# Patient Record
Sex: MALE | Race: WHITE | NOT HISPANIC OR LATINO | Employment: OTHER | URBAN - METROPOLITAN AREA
[De-identification: names, ages, dates, MRNs, and addresses within clinical notes are randomized per-mention and may not be internally consistent; named-entity substitution may affect disease eponyms.]

---

## 2017-01-29 ENCOUNTER — HOSPITAL ENCOUNTER (OUTPATIENT)
Dept: RADIOLOGY | Facility: MEDICAL CENTER | Age: 67
End: 2017-01-29
Attending: INTERNAL MEDICINE
Payer: MEDICARE

## 2017-01-29 DIAGNOSIS — C43.4 MALIGNANT MELANOMA OF SKIN OF SCALP AND NECK (HCC): ICD-10-CM

## 2017-01-29 PROCEDURE — 71260 CT THORAX DX C+: CPT

## 2017-01-29 PROCEDURE — 700117 HCHG RX CONTRAST REV CODE 255: Performed by: INTERNAL MEDICINE

## 2017-01-29 RX ADMIN — IOHEXOL 100 ML: 350 INJECTION, SOLUTION INTRAVENOUS at 15:03

## 2017-03-30 ENCOUNTER — HOSPITAL ENCOUNTER (OUTPATIENT)
Dept: RADIOLOGY | Facility: MEDICAL CENTER | Age: 67
End: 2017-03-30
Attending: INTERNAL MEDICINE
Payer: MEDICARE

## 2017-03-30 DIAGNOSIS — C43.4 MALIGNANT MELANOMA OF SKIN OF SCALP AND NECK (HCC): ICD-10-CM

## 2017-03-30 PROCEDURE — 71260 CT THORAX DX C+: CPT

## 2017-03-30 PROCEDURE — 700117 HCHG RX CONTRAST REV CODE 255: Performed by: INTERNAL MEDICINE

## 2017-03-30 RX ADMIN — IOHEXOL 100 ML: 350 INJECTION, SOLUTION INTRAVENOUS at 11:05

## 2017-08-09 ENCOUNTER — HOSPITAL ENCOUNTER (OUTPATIENT)
Dept: RADIOLOGY | Facility: MEDICAL CENTER | Age: 67
End: 2017-08-09
Attending: INTERNAL MEDICINE
Payer: MEDICARE

## 2017-08-09 DIAGNOSIS — C43.4 MALIGNANT MELANOMA OF SKIN OF SCALP AND NECK (HCC): ICD-10-CM

## 2017-08-09 PROCEDURE — 70553 MRI BRAIN STEM W/O & W/DYE: CPT

## 2017-08-09 PROCEDURE — A9579 GAD-BASE MR CONTRAST NOS,1ML: HCPCS | Performed by: INTERNAL MEDICINE

## 2017-08-09 PROCEDURE — 700117 HCHG RX CONTRAST REV CODE 255: Performed by: INTERNAL MEDICINE

## 2017-08-09 RX ADMIN — GADODIAMIDE 20 ML: 287 INJECTION INTRAVENOUS at 13:57

## 2017-08-10 ENCOUNTER — HOSPITAL ENCOUNTER (OUTPATIENT)
Dept: RADIOLOGY | Facility: MEDICAL CENTER | Age: 67
End: 2017-08-10
Attending: INTERNAL MEDICINE
Payer: MEDICARE

## 2017-08-10 DIAGNOSIS — C43.4 MALIGNANT MELANOMA OF SKIN OF SCALP AND NECK (HCC): ICD-10-CM

## 2017-08-10 PROCEDURE — 700117 HCHG RX CONTRAST REV CODE 255: Performed by: INTERNAL MEDICINE

## 2017-08-10 PROCEDURE — 71260 CT THORAX DX C+: CPT

## 2017-08-10 RX ADMIN — IOHEXOL 100 ML: 350 INJECTION, SOLUTION INTRAVENOUS at 13:44

## 2017-09-11 ENCOUNTER — HOSPITAL ENCOUNTER (EMERGENCY)
Facility: MEDICAL CENTER | Age: 67
End: 2017-09-11
Attending: EMERGENCY MEDICINE
Payer: MEDICARE

## 2017-09-11 ENCOUNTER — APPOINTMENT (OUTPATIENT)
Dept: RADIOLOGY | Facility: MEDICAL CENTER | Age: 67
End: 2017-09-11
Attending: EMERGENCY MEDICINE
Payer: MEDICARE

## 2017-09-11 VITALS
SYSTOLIC BLOOD PRESSURE: 115 MMHG | BODY MASS INDEX: 29.8 KG/M2 | HEART RATE: 95 BPM | OXYGEN SATURATION: 91 % | WEIGHT: 220.02 LBS | TEMPERATURE: 96.2 F | HEIGHT: 72 IN | DIASTOLIC BLOOD PRESSURE: 77 MMHG | RESPIRATION RATE: 16 BRPM

## 2017-09-11 DIAGNOSIS — R42 DIZZINESS: ICD-10-CM

## 2017-09-11 LAB
ALBUMIN SERPL BCP-MCNC: 3.9 G/DL (ref 3.2–4.9)
ALBUMIN/GLOB SERPL: 1.6 G/DL
ALP SERPL-CCNC: 109 U/L (ref 30–99)
ALT SERPL-CCNC: 19 U/L (ref 2–50)
ANION GAP SERPL CALC-SCNC: 7 MMOL/L (ref 0–11.9)
APPEARANCE UR: CLEAR
AST SERPL-CCNC: 26 U/L (ref 12–45)
BASOPHILS # BLD AUTO: 0.6 % (ref 0–1.8)
BASOPHILS # BLD: 0.05 K/UL (ref 0–0.12)
BILIRUB SERPL-MCNC: 0.6 MG/DL (ref 0.1–1.5)
BILIRUB UR QL STRIP.AUTO: NEGATIVE
BNP SERPL-MCNC: 44 PG/ML (ref 0–100)
BUN SERPL-MCNC: 10 MG/DL (ref 8–22)
CALCIUM SERPL-MCNC: 9.6 MG/DL (ref 8.5–10.5)
CHLORIDE SERPL-SCNC: 110 MMOL/L (ref 96–112)
CO2 SERPL-SCNC: 24 MMOL/L (ref 20–33)
COLOR UR: NORMAL
CREAT SERPL-MCNC: 0.85 MG/DL (ref 0.5–1.4)
CULTURE IF INDICATED INDCX: NO UA CULTURE
EKG IMPRESSION: NORMAL
EOSINOPHIL # BLD AUTO: 0.1 K/UL (ref 0–0.51)
EOSINOPHIL NFR BLD: 1.2 % (ref 0–6.9)
ERYTHROCYTE [DISTWIDTH] IN BLOOD BY AUTOMATED COUNT: 43.6 FL (ref 35.9–50)
GFR SERPL CREATININE-BSD FRML MDRD: >60 ML/MIN/1.73 M 2
GLOBULIN SER CALC-MCNC: 2.5 G/DL (ref 1.9–3.5)
GLUCOSE SERPL-MCNC: 106 MG/DL (ref 65–99)
GLUCOSE UR STRIP.AUTO-MCNC: NEGATIVE MG/DL
HCT VFR BLD AUTO: 48.3 % (ref 42–52)
HGB BLD-MCNC: 16.1 G/DL (ref 14–18)
IMM GRANULOCYTES # BLD AUTO: 0.04 K/UL (ref 0–0.11)
IMM GRANULOCYTES NFR BLD AUTO: 0.5 % (ref 0–0.9)
KETONES UR STRIP.AUTO-MCNC: NEGATIVE MG/DL
LEUKOCYTE ESTERASE UR QL STRIP.AUTO: NEGATIVE
LYMPHOCYTES # BLD AUTO: 2.36 K/UL (ref 1–4.8)
LYMPHOCYTES NFR BLD: 29.4 % (ref 22–41)
MCH RBC QN AUTO: 29.8 PG (ref 27–33)
MCHC RBC AUTO-ENTMCNC: 33.3 G/DL (ref 33.7–35.3)
MCV RBC AUTO: 89.4 FL (ref 81.4–97.8)
MICRO URNS: NORMAL
MONOCYTES # BLD AUTO: 0.8 K/UL (ref 0–0.85)
MONOCYTES NFR BLD AUTO: 10 % (ref 0–13.4)
NEUTROPHILS # BLD AUTO: 4.67 K/UL (ref 1.82–7.42)
NEUTROPHILS NFR BLD: 58.3 % (ref 44–72)
NITRITE UR QL STRIP.AUTO: NEGATIVE
NRBC # BLD AUTO: 0 K/UL
NRBC BLD AUTO-RTO: 0 /100 WBC
PH UR STRIP.AUTO: 5 [PH]
PLATELET # BLD AUTO: 116 K/UL (ref 164–446)
PMV BLD AUTO: 12.3 FL (ref 9–12.9)
POTASSIUM SERPL-SCNC: 3.7 MMOL/L (ref 3.6–5.5)
PROT SERPL-MCNC: 6.4 G/DL (ref 6–8.2)
PROT UR QL STRIP: NEGATIVE MG/DL
RBC # BLD AUTO: 5.4 M/UL (ref 4.7–6.1)
RBC UR QL AUTO: NEGATIVE
SODIUM SERPL-SCNC: 141 MMOL/L (ref 135–145)
SP GR UR STRIP.AUTO: 1.03
T4 FREE SERPL-MCNC: 0.83 NG/DL (ref 0.53–1.43)
TROPONIN I SERPL-MCNC: <0.01 NG/ML (ref 0–0.04)
TSH SERPL DL<=0.005 MIU/L-ACNC: 2.41 UIU/ML (ref 0.3–3.7)
UROBILINOGEN UR STRIP.AUTO-MCNC: 0.2 MG/DL
WBC # BLD AUTO: 8 K/UL (ref 4.8–10.8)

## 2017-09-11 PROCEDURE — 80053 COMPREHEN METABOLIC PANEL: CPT

## 2017-09-11 PROCEDURE — 99284 EMERGENCY DEPT VISIT MOD MDM: CPT

## 2017-09-11 PROCEDURE — 93005 ELECTROCARDIOGRAM TRACING: CPT | Performed by: EMERGENCY MEDICINE

## 2017-09-11 PROCEDURE — 84439 ASSAY OF FREE THYROXINE: CPT

## 2017-09-11 PROCEDURE — 83880 ASSAY OF NATRIURETIC PEPTIDE: CPT

## 2017-09-11 PROCEDURE — 71020 DX-CHEST-2 VIEWS: CPT

## 2017-09-11 PROCEDURE — 81003 URINALYSIS AUTO W/O SCOPE: CPT

## 2017-09-11 PROCEDURE — 36415 COLL VENOUS BLD VENIPUNCTURE: CPT

## 2017-09-11 PROCEDURE — 84443 ASSAY THYROID STIM HORMONE: CPT

## 2017-09-11 PROCEDURE — 84484 ASSAY OF TROPONIN QUANT: CPT

## 2017-09-11 PROCEDURE — 85025 COMPLETE CBC W/AUTO DIFF WBC: CPT

## 2017-09-11 ASSESSMENT — LIFESTYLE VARIABLES: DO YOU DRINK ALCOHOL: NO

## 2017-09-11 NOTE — ED PROVIDER NOTES
ED Provider Note    Scribed for Uvaldo Marino M.D. by Yon Julio. 9/11/2017, 4:14 PM.    Primary care provider: Yon Madison M.D.  Means of arrival: Walk In  History obtained from: Patient  History limited by: None    CHIEF COMPLAINT  Chief Complaint   Patient presents with   • Sent by MD Dr Agee    • Dizziness   • Leg Pain     bilateral, off an on   • Arm Pain     bilateral, off and on       Review of old chart shows no previous ED visits.  Recent CT scan a chest abdomen pelvis after diagnosis of malignant melanoma with no evidence of metastatic disease.  Findings as follows:     Impression            1.  There is been continued interval partial response to therapy with decrease in size of a prevascular lymph node with other mediastinal nodes stable.  2.  There has been interval decrease in size of the soft tissue nodule adjacent to the left quadratus lumborum muscle and inferior spleen with no change in the nodule adjacent to the superior spleen or inferior liver.  3.  There are no new areas of metastatic disease.   Reading Provider Reading Date   Monique Miranda M.D. Aug 10, 2017      Impression            1.  No evidence of intracranial metastatic disease.  2.  Mild diffuse cerebral and cerebellar substance loss.  3.  Mild microangiopathic ischemic change versus demyelination or gliosis.   Reading Provider Reading Date   Maxx Rao M.D. Aug 9, 2017        REVIEW OF SYSTEMS  ROS    PAST MEDICAL HISTORY   has a past medical history of Cancer (CMS-HCC); Heart burn; Hypertension; and Indigestion.    SURGICAL HISTORY   has a past surgical history that includes other; mass excision general (3/9/2012); recovery (7/26/2016); and colonoscopy - endo (10/9/2016).    SOCIAL HISTORY  Social History   Substance Use Topics   • Smoking status: Current Every Day Smoker     Packs/day: 1.00     Years: 40.00     Types: Cigarettes   • Smokeless tobacco: Never Used   • Alcohol use Yes      Comment: very little       History   Drug Use No       FAMILY HISTORY  History reviewed. No pertinent family history.    CURRENT MEDICATIONS  No current facility-administered medications on file prior to encounter.      Current Outpatient Prescriptions on File Prior to Encounter   Medication Sig Dispense Refill   • felodipine (PLENDIL) 5 MG TABLET SR 24 HR Take 5 mg by mouth every day.     • diltiazem (CARDIZEM) 30 MG Tab Take 1 Tab by mouth as needed. 30 Tab 3   • aspirin EC 81 MG EC tablet Take 1 Tab by mouth every day. 100 Tab 3   • loperamide (IMODIUM) 2 MG Cap Take 1 Cap by mouth 4 times a day as needed for Diarrhea. 30 Cap 3   • simvastatin (ZOCOR) 20 MG TABS Take 20 mg by mouth every morning.     • omeprazole (PRILOSEC) 20 MG CPDR Take 20 mg by mouth every day.     • valsartan-hydrochlorothiazide (DIOVAN HCT) 320-12.5 MG per tablet Take 1 Tab by mouth every day.     • Multiple Vitamin (MULTIVITAMIN PO) Take  by mouth every day.     • hydrocodone-acetaminophen (NORCO) 5-325 MG TABS per tablet Take 1-2 Tabs by mouth every four hours as needed.         ALLERGIES  Allergies   Allergen Reactions   • Sulfa Drugs      Severe muscle pains       PHYSICAL EXAM  VITAL SIGNS: /73   Pulse 89   Temp 36.6 °C (97.9 °F) (Temporal)   Resp 16   Ht 1.829 m (6')   Wt 99.8 kg (220 lb 0.3 oz)   SpO2 96%   BMI 29.84 kg/m²     Constitutional: Well developed, Well nourished, No acute distress, Non-toxic appearance.   HENT: Normocephalic, Atraumatic, Bilateral external ears normal, Oropharynx moist, no evidence of dehydration, No oral exudates, Nose normal.   Eyes: PERRLA, EOMI, Conjunctiva normal, No discharge.   Neck: Normal range of motion, No tenderness, Supple, No stridor. No masses. No evidence of meningitis or meningismus.   Lymphatic: No lymphadenopathy noted.   Cardiovascular: Normal heart rate, Normal rhythm, No murmurs, No rubs, No gallops.   Thorax & Lungs: Normal breath sounds, No respiratory distress, No wheezing or rhonchi, No chest  tenderness.   Abdomen: Bowel sounds normal, Soft, No tenderness, No masses, No pulsatile masses. No guarding or rebound. No evidence of peritoneal findings.  Skin: Warm, Dry, No erythema, No rash. No exanthem.   Back: No tenderness  Extremities:  No edema, No tenderness, No cyanosis, No clubbing.   Musculoskeletal: Good range of motion in all major joints. No major deformities noted.   Neurologic: Alert & oriented x 3, Normal motor function, No focal deficits noted. And a stroke scale of 0  Psychiatric: Affect normal, mood normal.                                                              DIAGNOSTIC STUDIES / PROCEDURES    LABS  Results for orders placed or performed during the hospital encounter of 09/11/17   CBC WITH DIFFERENTIAL   Result Value Ref Range    WBC 8.0 4.8 - 10.8 K/uL    RBC 5.40 4.70 - 6.10 M/uL    Hemoglobin 16.1 14.0 - 18.0 g/dL    Hematocrit 48.3 42.0 - 52.0 %    MCV 89.4 81.4 - 97.8 fL    MCH 29.8 27.0 - 33.0 pg    MCHC 33.3 (L) 33.7 - 35.3 g/dL    RDW 43.6 35.9 - 50.0 fL    Platelet Count 116 (L) 164 - 446 K/uL    MPV 12.3 9.0 - 12.9 fL    Neutrophils-Polys 58.30 44.00 - 72.00 %    Lymphocytes 29.40 22.00 - 41.00 %    Monocytes 10.00 0.00 - 13.40 %    Eosinophils 1.20 0.00 - 6.90 %    Basophils 0.60 0.00 - 1.80 %    Immature Granulocytes 0.50 0.00 - 0.90 %    Nucleated RBC 0.00 /100 WBC    Neutrophils (Absolute) 4.67 1.82 - 7.42 K/uL    Lymphs (Absolute) 2.36 1.00 - 4.80 K/uL    Monos (Absolute) 0.80 0.00 - 0.85 K/uL    Eos (Absolute) 0.10 0.00 - 0.51 K/uL    Baso (Absolute) 0.05 0.00 - 0.12 K/uL    Immature Granulocytes (abs) 0.04 0.00 - 0.11 K/uL    NRBC (Absolute) 0.00 K/uL   COMP METABOLIC PANEL   Result Value Ref Range    Sodium 141 135 - 145 mmol/L    Potassium 3.7 3.6 - 5.5 mmol/L    Chloride 110 96 - 112 mmol/L    Co2 24 20 - 33 mmol/L    Anion Gap 7.0 0.0 - 11.9    Glucose 106 (H) 65 - 99 mg/dL    Bun 10 8 - 22 mg/dL    Creatinine 0.85 0.50 - 1.40 mg/dL    Calcium 9.6 8.5 - 10.5 mg/dL     AST(SGOT) 26 12 - 45 U/L    ALT(SGPT) 19 2 - 50 U/L    Alkaline Phosphatase 109 (H) 30 - 99 U/L    Total Bilirubin 0.6 0.1 - 1.5 mg/dL    Albumin 3.9 3.2 - 4.9 g/dL    Total Protein 6.4 6.0 - 8.2 g/dL    Globulin 2.5 1.9 - 3.5 g/dL    A-G Ratio 1.6 g/dL   ESTIMATED GFR   Result Value Ref Range    GFR If African American >60 >60 mL/min/1.73 m 2    GFR If Non African American >60 >60 mL/min/1.73 m 2       All labs reviewed by me.    RADIOLOGY  DX-CHEST-2 VIEWS   Final Result      No acute cardiopulmonary disease.        The radiologist's interpretation of all radiological studies have been reviewed by me.    COURSE & MEDICAL DECISION MAKING  Nursing notes, VS, PMSFHx reviewed in chart.    Review of old chart shows no previous ED visits.  Recent CT scan a chest abdomen pelvis after diagnosis of malignant melanoma with no evidence of metastatic disease.  Findings as follows:     Impression            1.  There is been continued interval partial response to therapy with decrease in size of a prevascular lymph node with other mediastinal nodes stable.  2.  There has been interval decrease in size of the soft tissue nodule adjacent to the left quadratus lumborum muscle and inferior spleen with no change in the nodule adjacent to the superior spleen or inferior liver.  3.  There are no new areas of metastatic disease.   Reading Provider Reading Date   Monique Miranda M.D. Aug 10, 2017      Impression            1.  No evidence of intracranial metastatic disease.  2.  Mild diffuse cerebral and cerebellar substance loss.  3.  Mild microangiopathic ischemic change versus demyelination or gliosis.   Reading Provider Reading Date   Maxx Rao M.D. Aug 9, 2017          4:48 PM - Patient seen and examined at bedside. Ordered chest x-ray, TSH, CBC with differential, CMP, Troponin, BNP, UA to evaluate his symptoms. The differential diagnoses include but are not limited to:Neurologic versus cardiac events    3:56 PM -  Consulted with Dr. Agee, Oncology, regarding patient's case.     6:55 PM - Reviewed diagnostic study results     7:00 PM - Paged Dr. Agee, Oncology    7:05 PM - I discussed patient's case and findings noted above with Dr. Agee, Oncology.     7:24 PM - Recheck patient. I discussed plan of care with patient regarding admission to hospital for further observation. Patient declines admission and states he will follow up with PCP.         The patient will return for new or worsening symptoms and is stable at the time of discharge.    The patient is referred to a primary physician for blood pressure management, diabetic screening, and for all other preventative health concerns.    Discussed disposition with Dr. Agee. He will follow-up with the patient in the office.    DISPOSITION:  Patient will be discharged home in stable condition.    FOLLOW UP:  No follow-up provider specified.    OUTPATIENT MEDICATIONS:  New Prescriptions    No medications on file          FINAL IMPRESSION  1. Dizziness           Yon DICKINSON (Jimenaibe), am scribing for, and in the presence of, Uvaldo Marino M.D..    Electronically signed by: Yon Julio (Brian), 9/11/2017    Uvaldo DICKINSON M.D. personally performed the services described in this documentation, as scribed by Yon Julio in my presence, and it is both accurate and complete.    The note accurately reflects work and decisions made by me.  Uvaldo Marino  9/11/2017  8:00 PM

## 2017-09-11 NOTE — ED NOTES
Chief Complaint   Patient presents with   • Sent by MD Dr Agee    • Dizziness   • Leg Pain     bilateral, off an on   • Arm Pain     bilateral, off and on     Dr Agee suspects dehydration, told pt to come to ER. Pt speaking in full sentences, in NAD.       /73   Pulse 89   Temp 36.6 °C (97.9 °F) (Temporal)   Resp 16   Ht 1.829 m (6')   Wt 99.8 kg (220 lb 0.3 oz)   SpO2 96%   BMI 29.84 kg/m²       Pt Informed regarding triage process and verbalized understanding to inform triage tech or RN for any changes in condition.  Placed in lobby.

## 2017-09-11 NOTE — ED PROVIDER NOTES
ED Provider Note          CHIEF COMPLAINT  Chief Complaint   Patient presents with   • Sent by MD Dr Agee    • Dizziness   • Leg Pain     bilateral, off an on   • Arm Pain     bilateral, off and on       HPI  Artem Joseph is a 67 y.o. male who presents to the emergency department come in by wife for evaluation of episodes of near-syncope. Patient states that if he does not exert effort  these symptoms do not occur. Patient has recent event while doing minimal guarding. He is not completely passed out. Patient notes that since chemotherapy for his malignant melanoma he's had intermittent difficulties. Initially he thought it was secondary to the chemotherapy. These episodes and associated with headache chest pain or shortness of breath. Patient notes that he takes his pulse ox blood pressure and pulse during these episodes and has not found any abnormality.    Patient contacted his oncologist Dr. Agee and was advised to report to the ED and contact Dr. Agee upon arrival.    He notes no aggravating or relieving maneuvers. After speaking with Dr. Agee had cough occasions of immunotherapy including hepatitis colitis and thyroid disease. Patient's recently been tapered down on his prednisone to 2.5 mg every day. He described no history of heart disease lung disease or previous neurologic disease. No stroke. No focal weakness. Patient does describe some vertigo-type symptoms. No difficulty with speech. No aggravating or relieving maneuvers.    Review of old chart shows no previous ED visits.  Recent CT scan a chest abdomen pelvis after diagnosis of malignant melanoma with no evidence of metastatic disease.  Findings as follows:    Impression       1.  There is been continued interval partial response to therapy with decrease in size of a prevascular lymph node with other mediastinal nodes stable.  2.  There has been interval decrease in size of the soft tissue nodule adjacent to the left quadratus lumborum  muscle and inferior spleen with no change in the nodule adjacent to the superior spleen or inferior liver.  3.  There are no new areas of metastatic disease.   Reading Provider Reading Date   Monique Miranda M.D. Aug 10, 2017     Impression       1.  No evidence of intracranial metastatic disease.  2.  Mild diffuse cerebral and cerebellar substance loss.  3.  Mild microangiopathic ischemic change versus demyelination or gliosis.   Reading Provider Reading Date   Maxx Rao M.D. Aug 9, 2017       REVIEW OF SYSTEMS  See HPI for further details. All other systems are negative.     PAST MEDICAL HISTORY  Past Medical History:   Diagnosis Date   • Cancer (CMS-HCC)     melanoma in 2007, original 1989 from back of neck   • Heart burn    • Hypertension    • Indigestion        FAMILY HISTORY  No significant family history    SOCIAL HISTORY  Social History     Social History   • Marital status:      Spouse name: N/A   • Number of children: N/A   • Years of education: N/A     Social History Main Topics   • Smoking status: Current Every Day Smoker     Packs/day: 1.00     Years: 40.00     Types: Cigarettes   • Smokeless tobacco: Never Used   • Alcohol use Yes      Comment: very little   • Drug use: No   • Sexual activity: Not on file     Other Topics Concern   • Not on file     Social History Narrative   • No narrative on file       SURGICAL HISTORY  Past Surgical History:   Procedure Laterality Date   • COLONOSCOPY - ENDO  10/9/2016    Procedure: COLONOSCOPY - ENDO;  Surgeon: William Mitchell M.D.;  Location: SURGERY [facility];  Service:    • RECOVERY  7/26/2016    Procedure: CT-CT Guided abdominal mass-;  Surgeon: [facility];  Location: SURGERY PRE-POST [facility] UNIT [facility] [place];  Service:    • MASS EXCISION GENERAL  3/9/2012    Performed by MARLEN KEEN at SURGERY SAME DAY [facility]   • OTHER      lymph nodes back of neck       CURRENT MEDICATIONS  Home Medications    **Home medications have not yet  been reviewed for this encounter**          ALLERGIES  Allergies   Allergen Reactions   • Sulfa Drugs      Severe muscle pains       PHYSICAL EXAM  VITAL SIGNS: /73   Pulse 89   Temp 36.6 °C (97.9 °F) (Temporal)   Resp 16   Ht 1.829 m (6')   Wt 99.8 kg (220 lb 0.3 oz)   SpO2 96%   BMI 29.84 kg/m²     Constitutional: Well developed, Well nourished, No acute distress, Non-toxic appearance.   HENT: Normocephalic, Atraumatic, Bilateral external ears normal, Oropharynx moist, no evidence of dehydration, No oral exudates, Nose normal.   Eyes: PERRLA, EOMI, Conjunctiva normal, No discharge.   Neck: Normal range of motion, No tenderness, Supple, No stridor. No masses. No evidence of meningitis or meningismus.   Lymphatic: No lymphadenopathy noted.   Cardiovascular: Normal heart rate, Normal rhythm, No murmurs, No rubs, No gallops.   Thorax & Lungs: Normal breath sounds, No respiratory distress, No wheezing or rhonchi, No chest tenderness.   Abdomen: Bowel sounds normal, Soft, No tenderness, No masses, No pulsatile masses. No guarding or rebound. No evidence of peritoneal findings.  Skin: Warm, Dry, No erythema, No rash. No exanthem.   Back: No tenderness.   Extremities: Intact distal pulses, No edema, No tenderness, No cyanosis, No clubbing. No clinical evidence of DVT  Musculoskeletal: Good range of motion in all major joints. No major deformities noted.   Neurologic: Alert & oriented x 3, Normal motor function, No focal deficits noted. NIHstroke scale of 0  Psychiatric: Affect normal, mood normal.                                                     Urologic: No CVA tenderness no evidence of pyelonephritis.                         EKG  EKG Interpretation    Interpreted by me    Rhythm: normal sinus   Rate: normal 64  Axis: normal  Ectopy: none  Conduction: right bundle branch block (complete)  ST Segments: depression in  v1 and v2  T Waves: inversion in  v1 and v2  Q Waves: Inferior Q's in 3 and  aVF    Clinical Impression: no acute changes and right bundle branch block    Does have an change from previous EKG.    RADIOLOGY/PROCEDURES  DX-CHEST-2 VIEWS   Final Result      No acute cardiopulmonary disease.            COURSE & MEDICAL DECISION MAKING  Pertinent Labs & Imaging studies reviewed. (See chart for details)      7:05 PM once again spoke with Dr. Agee. He requested to be called after the laboratory evaluation. Notes possible symptoms secondary to coming off of high dose of steroids.. He suggests orthostatic vital signs. He will follow up in clinic.    Discussed At length with patient and his wife admission for observation. Patient will not be admitted. Advised Holter monitoring to be placed tomorrow. Patient is not believed secondary to his heart as he is monitoring his pulse and blood pressure and pulse ox during these events.    FINAL IMPRESSION  1. Dizziness    2. Without evidence of acute neurosurgical abnormality  3. Without evidence of acute cardiac event       Electronically signed by: Uvaldo Marino, 9/11/2017 7:09 PM

## 2017-09-11 NOTE — ED NOTES
".  Chief Complaint   Patient presents with   • Sent by MD Dr Agee    • Dizziness   • Leg Pain     bilateral, off an on   • Arm Pain     bilateral, off and on   Agree with triage assessment. Onset approximately one month ago, \" getting progressively worse.\"  Pt states \" the warmer it is the faster the symptoms come on - nausea and fatigue.\"  No chest pain.  No headache, \" just general weakness all the time.\"      "

## 2017-09-12 ENCOUNTER — PATIENT OUTREACH (OUTPATIENT)
Dept: HEALTH INFORMATION MANAGEMENT | Facility: OTHER | Age: 67
End: 2017-09-12

## 2017-09-12 NOTE — DISCHARGE INSTRUCTIONS
Dizziness  Dizziness is a common problem. It is a feeling of unsteadiness or light-headedness. You may feel like you are about to faint. Dizziness can lead to injury if you stumble or fall. Anyone can become dizzy, but dizziness is more common in older adults. This condition can be caused by a number of things, including medicines, dehydration, or illness.  HOME CARE INSTRUCTIONS  Taking these steps may help with your condition:  Eating and Drinking  · Drink enough fluid to keep your urine clear or pale yellow. This helps to keep you from becoming dehydrated. Try to drink more clear fluids, such as water.  · Do not drink alcohol.  · Limit your caffeine intake if directed by your health care provider.  · Limit your salt intake if directed by your health care provider.  Activity  · Avoid making quick movements.  ¨ Rise slowly from chairs and steady yourself until you feel okay.  ¨ In the morning, first sit up on the side of the bed. When you feel okay, stand slowly while you hold onto something until you know that your balance is fine.  · Move your legs often if you need to  one place for a long time. Tighten and relax your muscles in your legs while you are standing.  · Do not drive or operate heavy machinery if you feel dizzy.  · Avoid bending down if you feel dizzy. Place items in your home so that they are easy for you to reach without leaning over.  Lifestyle  · Do not use any tobacco products, including cigarettes, chewing tobacco, or electronic cigarettes. If you need help quitting, ask your health care provider.  · Try to reduce your stress level, such as with yoga or meditation. Talk with your health care provider if you need help.  General Instructions  · Watch your dizziness for any changes.  · Take medicines only as directed by your health care provider. Talk with your health care provider if you think that your dizziness is caused by a medicine that you are taking.  · Tell a friend or a family  member that you are feeling dizzy. If he or she notices any changes in your behavior, have this person call your health care provider.  · Keep all follow-up visits as directed by your health care provider. This is important.  SEEK MEDICAL CARE IF:  · Your dizziness does not go away.  · Your dizziness or light-headedness gets worse.  · You feel nauseous.  · You have reduced hearing.  · You have new symptoms.  · You are unsteady on your feet or you feel like the room is spinning.  SEEK IMMEDIATE MEDICAL CARE IF:  · You vomit or have diarrhea and are unable to eat or drink anything.  · You have problems talking, walking, swallowing, or using your arms, hands, or legs.  · You feel generally weak.  · You are not thinking clearly or you have trouble forming sentences. It may take a friend or family member to notice this.  · You have chest pain, abdominal pain, shortness of breath, or sweating.  · Your vision changes.  · You notice any bleeding.  · You have a headache.  · You have neck pain or a stiff neck.  · You have a fever.     This information is not intended to replace advice given to you by your health care provider. Make sure you discuss any questions you have with your health care provider.     Document Released: 06/13/2002 Document Revised: 05/03/2016 Document Reviewed: 12/14/2015  [employer] Interactive Patient Education ©2016 [employer]      Cardiac Event Monitoring  A cardiac event monitor is a small recording device used to help detect abnormal heart rhythms (arrhythmias). The monitor is used to record heart rhythm when noticeable symptoms such as the following occur:  · Fast heartbeats (palpitations), such as heart racing or fluttering.  · Dizziness.  · Fainting or light-headedness.  · Unexplained weakness.  The monitor is wired to two electrodes placed on your chest. Electrodes are flat, sticky disks that attach to your skin. The monitor can be worn for up to 30 days. You will wear the monitor at all  times, except when bathing.   HOW TO USE YOUR CARDIAC EVENT MONITOR  A technician will prepare your chest for the electrode placement. The technician will show you how to place the electrodes, how to work the monitor, and how to replace the batteries. Take time to practice using the monitor before you leave the office. Make sure you understand how to send the information from the monitor to your health care provider. This requires a telephone with a landline, not a cell phone. You need to:  · Wear your monitor at all times, except when you are in water:  ¨ Do not get the monitor wet.  ¨ Take the monitor off when bathing. Do not swim or use a hot tub with it on.  · Keep your skin clean. Do not put body lotion or moisturizer on your chest.  · Change the electrodes daily or any time they stop sticking to your skin. You might need to use tape to keep them on.  · It is possible that your skin under the electrodes could become irritated. To keep this from happening, try to put the electrodes in slightly different places on your chest. However, they must remain in the area under your left breast and in the upper right section of your chest.  · Make sure the monitor is safely clipped to your clothing or in a location close to your body that your health care provider recommends.  · Press the button to record when you feel symptoms of heart trouble, such as dizziness, weakness, light-headedness, palpitations, thumping, shortness of breath, unexplained weakness, or a fluttering or racing heart. The monitor is always on and records what happened slightly before you pressed the button, so do not worry about being too late to get good information.  · Keep a diary of your activities, such as walking, doing chores, and taking medicine. It is especially important to note what you were doing when you pushed the button to record your symptoms. This will help your health care provider determine what might be contributing to your  symptoms. The information stored in your monitor will be reviewed by your health care provider alongside your diary entries.  · Send the recorded information as recommended by your health care provider. It is important to understand that it will take some time for your health care provider to process the results.  · Change the batteries as recommended by your health care provider.  SEEK IMMEDIATE MEDICAL CARE IF:   · You have chest pain.  · You have extreme difficulty breathing or shortness of breath.  · You develop a very fast heartbeat that persists.  · You develop dizziness that does not go away.  · You faint or constantly feel you are about to faint.     This information is not intended to replace advice given to you by your health care provider. Make sure you discuss any questions you have with your health care provider.    Return if you change your decision about admission. Contact your physician tomorrow to arrange for Holter monitoring.     Document Released: 09/26/2009 Document Revised: 01/08/2016 Document Reviewed: 06/16/2014  [employer]vier Interactive Patient Education ©2016 [employer]

## 2017-09-12 NOTE — PROGRESS NOTES
Placed discharge outreach phone call to patient s/p ER discharge 9/11/17.  Left voicemail providing my contact information and instructions to call with any questions or concerns.

## 2017-09-12 NOTE — ED NOTES
Report rec'd from SCOTT Reece.    ED tech completed orthostatics.    New lab orders added to blood in lab..

## 2017-11-06 ENCOUNTER — OFFICE VISIT (OUTPATIENT)
Dept: CARDIOLOGY | Facility: MEDICAL CENTER | Age: 67
End: 2017-11-06
Payer: MEDICARE

## 2017-11-06 VITALS
OXYGEN SATURATION: 92 % | HEIGHT: 72 IN | WEIGHT: 220 LBS | DIASTOLIC BLOOD PRESSURE: 82 MMHG | HEART RATE: 84 BPM | BODY MASS INDEX: 29.8 KG/M2 | SYSTOLIC BLOOD PRESSURE: 110 MMHG

## 2017-11-06 DIAGNOSIS — E87.6 HYPOKALEMIA: ICD-10-CM

## 2017-11-06 DIAGNOSIS — C43.9 METASTATIC MELANOMA (HCC): ICD-10-CM

## 2017-11-06 DIAGNOSIS — I10 ESSENTIAL HYPERTENSION: ICD-10-CM

## 2017-11-06 DIAGNOSIS — I48.91 ATRIAL FIBRILLATION WITH RAPID VENTRICULAR RESPONSE (HCC): ICD-10-CM

## 2017-11-06 DIAGNOSIS — I20.0 UNSTABLE ANGINA PECTORIS (HCC): ICD-10-CM

## 2017-11-06 PROBLEM — I25.9 CHEST PAIN DUE TO MYOCARDIAL ISCHEMIA: Status: ACTIVE | Noted: 2017-11-06

## 2017-11-06 PROCEDURE — 99215 OFFICE O/P EST HI 40 MIN: CPT | Performed by: INTERNAL MEDICINE

## 2017-11-06 RX ORDER — FLECAINIDE ACETATE 50 MG/1
50 TABLET ORAL 2 TIMES DAILY
Qty: 60 TAB | Refills: 11 | Status: SHIPPED | OUTPATIENT
Start: 2017-11-06 | End: 2017-12-15 | Stop reason: SDUPTHER

## 2017-11-06 RX ORDER — DILTIAZEM HYDROCHLORIDE 120 MG/1
120 CAPSULE, COATED, EXTENDED RELEASE ORAL DAILY
Qty: 30 CAP | Refills: 11 | Status: SHIPPED | OUTPATIENT
Start: 2017-11-06 | End: 2018-10-21 | Stop reason: SDUPTHER

## 2017-11-06 RX ORDER — TAMSULOSIN HYDROCHLORIDE 0.4 MG/1
0.4 CAPSULE ORAL
COMMUNITY
End: 2022-05-02

## 2017-11-06 RX ORDER — PREDNISONE 10 MG/1
5 TABLET ORAL DAILY
COMMUNITY
End: 2018-10-15

## 2017-11-06 RX ORDER — LEVOTHYROXINE SODIUM 137 UG/1
137 TABLET ORAL
COMMUNITY
End: 2018-04-09

## 2017-11-06 ASSESSMENT — ENCOUNTER SYMPTOMS
HEMOPTYSIS: 0
BRUISES/BLEEDS EASILY: 0
WHEEZING: 0
CLAUDICATION: 0
SPUTUM PRODUCTION: 0
STRIDOR: 0
EYES NEGATIVE: 1
SHORTNESS OF BREATH: 0
RESPIRATORY NEGATIVE: 1
CHILLS: 0
MUSCULOSKELETAL NEGATIVE: 1
DIZZINESS: 0
SORE THROAT: 0
WEAKNESS: 1
ORTHOPNEA: 0
PALPITATIONS: 0
FEVER: 0
COUGH: 0
GASTROINTESTINAL NEGATIVE: 1
PND: 0
LOSS OF CONSCIOUSNESS: 0
CARDIOVASCULAR NEGATIVE: 1

## 2017-11-06 NOTE — LETTER
[facility]   [address] [place] [address]  [place] [address]  Phone: [phone]  Fax: [phone]              Artem Joseph  1950    Encounter Date: 11/6/2017    Boogie Ramirez M.D.          PROGRESS NOTE:  Subjective:   Artem Joseph is a 67 y.o. male who presents today as a follow-up for his paroxysmal atrial fibrillation. Since he was last seen he continues have paroxysms of what sounds like atrial fibrillation. During these times he was also having problems with some chest pain. Of note he has some dense calcifications seen in his LAD. He also has stage IV melanoma that is currently being treated with chemotherapy.  He is currently not taking any medications for rhythm control or rate control.  He's not had a  Event recorder or Holter monitor.    Past Medical History:   Diagnosis Date   • Cancer (CMS-HCC)     melanoma in 2007, original 1989 from back of neck   • Heart burn    • Hypertension    • Indigestion      Past Surgical History:   Procedure Laterality Date   • COLONOSCOPY - ENDO  10/9/2016    Procedure: COLONOSCOPY - ENDO;  Surgeon: William Mitchell M.D.;  Location: SURGERY [facility];  Service:    • RECOVERY  7/26/2016    Procedure: CT-CT Guided abdominal mass-;  Surgeon: [facility];  Location: SURGERY PRE-POST PROC UNIT [facility] [place];  Service:    • MASS EXCISION GENERAL  3/9/2012    Performed by MARLEN KEEN at SURGERY SAME DAY [facility]   • OTHER      lymph nodes back of neck     History reviewed. No pertinent family history.  History   Smoking Status   • Current Every Day Smoker   • Packs/day: 0.50   • Years: 40.00   • Types: Cigarettes   Smokeless Tobacco   • Never Used     Allergies   Allergen Reactions   • Sulfa Drugs      Severe muscle pains     Outpatient Encounter Prescriptions as of 11/6/2017   Medication Sig Dispense Refill   • levothyroxine (SYNTHROID) 137 MCG Tab Take 137 mcg by mouth Every morning on an empty  stomach.     • predniSONE (DELTASONE) 10 MG Tab Take 10 mg by mouth every day.     • tamsulosin (FLOMAX) 0.4 MG capsule Take 0.4 mg by mouth ONE-HALF HOUR AFTER BREAKFAST.     • diltiazem CD (CARDIZEM CD) 120 MG CAPSULE SR 24 HR Take 1 Cap by mouth every day. 30 Cap 11   • flecainide (TAMBOCOR) 50 MG tablet Take 1 Tab by mouth 2 times a day. 60 Tab 11   • felodipine (PLENDIL) 5 MG TABLET SR 24 HR Take 2.5 mg by mouth every day.     • diltiazem (CARDIZEM) 30 MG Tab Take 1 Tab by mouth as needed. 30 Tab 3   • loperamide (IMODIUM) 2 MG Cap Take 1 Cap by mouth 4 times a day as needed for Diarrhea. 30 Cap 3   • simvastatin (ZOCOR) 20 MG TABS Take 20 mg by mouth every morning.     • omeprazole (PRILOSEC) 20 MG CPDR Take 20 mg by mouth every day.     • Multiple Vitamin (MULTIVITAMIN PO) Take  by mouth every day.     • hydrocodone-acetaminophen (NORCO) 5-325 MG TABS per tablet Take 1-2 Tabs by mouth every four hours as needed.     • aspirin EC 81 MG EC tablet Take 1 Tab by mouth every day. (Patient not taking: Reported on 11/6/2017) 100 Tab 3   • valsartan-hydrochlorothiazide (DIOVAN HCT) 320-12.5 MG per tablet Take 1 Tab by mouth every day.       No facility-administered encounter medications on file as of 11/6/2017.      Review of Systems   Constitutional: Positive for malaise/fatigue. Negative for chills and fever.   HENT: Negative.  Negative for sore throat.    Eyes: Negative.    Respiratory: Negative.  Negative for cough, hemoptysis, sputum production, shortness of breath, wheezing and stridor.    Cardiovascular: Negative.  Negative for chest pain, palpitations, orthopnea, claudication, leg swelling and PND.   Gastrointestinal: Negative.    Genitourinary: Negative.    Musculoskeletal: Negative.    Skin: Negative.    Neurological: Positive for weakness. Negative for dizziness and loss of consciousness.   Endo/Heme/Allergies: Negative.  Does not bruise/bleed easily.   All other systems reviewed and are negative.        Objective:   /82   Pulse 84   Ht 1.829 m (6')   Wt 99.8 kg (220 lb)   SpO2 92%   BMI 29.84 kg/m²      Physical Exam   Constitutional: He is oriented to person, place, and time. He appears well-developed and well-nourished. No distress.   HENT:   Head: Normocephalic.   Mouth/Throat: Oropharynx is clear and moist.   Eyes: EOM are normal. Pupils are equal, round, and reactive to light. Right eye exhibits no discharge. Left eye exhibits no discharge. No scleral icterus.   Neck: Normal range of motion. Neck supple. No JVD present. No tracheal deviation present.   Cardiovascular: Normal rate, regular rhythm, S1 normal, S2 normal, normal heart sounds, intact distal pulses and normal pulses.  Exam reveals no gallop, no S3, no S4 and no friction rub.    No murmur heard.   No systolic murmur is present    No diastolic murmur is present   Pulses:       Carotid pulses are 2+ on the right side, and 2+ on the left side.       Radial pulses are 2+ on the right side, and 2+ on the left side.        Dorsalis pedis pulses are 2+ on the right side, and 2+ on the left side.        Posterior tibial pulses are 2+ on the right side, and 2+ on the left side.   Pulmonary/Chest: Effort normal and breath sounds normal. No respiratory distress. He has no wheezes. He has no rales.   Abdominal: Soft. Bowel sounds are normal. He exhibits no distension and no mass. There is no tenderness. There is no rebound and no guarding.   Musculoskeletal: He exhibits no edema.   Neurological: He is alert and oriented to person, place, and time. No cranial nerve deficit.   Skin: Skin is warm and dry. He is not diaphoretic. No pallor.   Psychiatric: He has a normal mood and affect. His behavior is normal. Judgment and thought content normal.   Nursing note and vitals reviewed.      Assessment:     1. Atrial fibrillation with rapid ventricular response (CMS-HCC)  diltiazem CD (CARDIZEM CD) 120 MG CAPSULE SR 24 HR    HOLTER MONITOR / EVENT RECORDER     flecainide (TAMBOCOR) 50 MG tablet   2. Essential hypertension  diltiazem CD (CARDIZEM CD) 120 MG CAPSULE SR 24 HR    HOLTER MONITOR / EVENT RECORDER   3. Metastatic melanoma (CMS-HCC)     4. Hypokalemia     5. Unstable angina pectoris (CMS-HCC)  flecainide (TAMBOCOR) 50 MG tablet       Medical Decision Making:  Today's Assessment / Status / Plan:     67-year-old male with stage IV metastatic melanoma being treated with chemotherapy as well as paroxysmal atrial fibrillation with calcium seen on a CT scan. His chest pain is likely happen in the setting of A. Fib with rapid ventricular response. I would like to start him on flecainide 50 twice a day as well as diltiazem. We will also get a an eventr recorder and see how often he is having any runs of atrial fibrillation.    1. A-fib    - start flecainide 50 BID    - start dilt 120 CD    - event recorder    2. 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 Stage IV melanoma    - defer    Thank for you allowing me to take part in your patient's care, please call should you have any questions or would like to discuss this patient.      Yon Madison M.D.  [address] [place] [address]  VIA Facsimile: [phone]

## 2017-11-07 NOTE — PROGRESS NOTES
Subjective:   Artem Joseph is a 67 y.o. male who presents today as a follow-up for his paroxysmal atrial fibrillation. Since he was last seen he continues have paroxysms of what sounds like atrial fibrillation. During these times he was also having problems with some chest pain. Of note he has some dense calcifications seen in his LAD. He also has stage IV melanoma that is currently being treated with chemotherapy.  He is currently not taking any medications for rhythm control or rate control.  He's not had a  Event recorder or Holter monitor.    Past Medical History:   Diagnosis Date   • Cancer (CMS-HCC)     melanoma in 2007, original 1989 from back of neck   • Heart burn    • Hypertension    • Indigestion      Past Surgical History:   Procedure Laterality Date   • COLONOSCOPY - ENDO  10/9/2016    Procedure: COLONOSCOPY - ENDO;  Surgeon: William Mitchell M.D.;  Location: SURGERY [facility];  Service:    • RECOVERY  7/26/2016    Procedure: CT-CT Guided abdominal mass-;  Surgeon: [facility];  [place] [facility] [place]  Service:    • MASS EXCISION GENERAL  3/9/2012    Performed by MARLEN KEEN at SURGERY SAME DAY [facility]   • OTHER      lymph nodes back of neck     History reviewed. No pertinent family history.  History   Smoking Status   • Current Every Day Smoker   • Packs/day: 0.50   • Years: 40.00   • Types: Cigarettes   Smokeless Tobacco   • Never Used     Allergies   Allergen Reactions   • Sulfa Drugs      Severe muscle pains     Outpatient Encounter Prescriptions as of 11/6/2017   Medication Sig Dispense Refill   • levothyroxine (SYNTHROID) 137 MCG Tab Take 137 mcg by mouth Every morning on an empty stomach.     • predniSONE (DELTASONE) 10 MG Tab Take 10 mg by mouth every day.     • tamsulosin (FLOMAX) 0.4 MG capsule Take 0.4 mg by mouth ONE-HALF HOUR AFTER BREAKFAST.     • diltiazem CD (CARDIZEM CD) 120 MG CAPSULE SR 24 HR Take 1 Cap by mouth every day. 30 Cap 11    • flecainide (TAMBOCOR) 50 MG tablet Take 1 Tab by mouth 2 times a day. 60 Tab 11   • felodipine (PLENDIL) 5 MG TABLET SR 24 HR Take 2.5 mg by mouth every day.     • diltiazem (CARDIZEM) 30 MG Tab Take 1 Tab by mouth as needed. 30 Tab 3   • loperamide (IMODIUM) 2 MG Cap Take 1 Cap by mouth 4 times a day as needed for Diarrhea. 30 Cap 3   • simvastatin (ZOCOR) 20 MG TABS Take 20 mg by mouth every morning.     • omeprazole (PRILOSEC) 20 MG CPDR Take 20 mg by mouth every day.     • Multiple Vitamin (MULTIVITAMIN PO) Take  by mouth every day.     • hydrocodone-acetaminophen (NORCO) 5-325 MG TABS per tablet Take 1-2 Tabs by mouth every four hours as needed.     • aspirin EC 81 MG EC tablet Take 1 Tab by mouth every day. (Patient not taking: Reported on 11/6/2017) 100 Tab 3   • valsartan-hydrochlorothiazide (DIOVAN HCT) 320-12.5 MG per tablet Take 1 Tab by mouth every day.       No facility-administered encounter medications on file as of 11/6/2017.      Review of Systems   Constitutional: Positive for malaise/fatigue. Negative for chills and fever.   HENT: Negative.  Negative for sore throat.    Eyes: Negative.    Respiratory: Negative.  Negative for cough, hemoptysis, sputum production, shortness of breath, wheezing and stridor.    Cardiovascular: Negative.  Negative for chest pain, palpitations, orthopnea, claudication, leg swelling and PND.   Gastrointestinal: Negative.    Genitourinary: Negative.    Musculoskeletal: Negative.    Skin: Negative.    Neurological: Positive for weakness. Negative for dizziness and loss of consciousness.   Endo/Heme/Allergies: Negative.  Does not bruise/bleed easily.   All other systems reviewed and are negative.       Objective:   /82   Pulse 84   Ht 1.829 m (6')   Wt 99.8 kg (220 lb)   SpO2 92%   BMI 29.84 kg/m²     Physical Exam   Constitutional: He is oriented to person, place, and time. He appears well-developed and well-nourished. No distress.   HENT:   Head:  Normocephalic.   Mouth/Throat: Oropharynx is clear and moist.   Eyes: EOM are normal. Pupils are equal, round, and reactive to light. Right eye exhibits no discharge. Left eye exhibits no discharge. No scleral icterus.   Neck: Normal range of motion. Neck supple. No JVD present. No tracheal deviation present.   Cardiovascular: Normal rate, regular rhythm, S1 normal, S2 normal, normal heart sounds, intact distal pulses and normal pulses.  Exam reveals no gallop, no S3, no S4 and no friction rub.    No murmur heard.   No systolic murmur is present    No diastolic murmur is present   Pulses:       Carotid pulses are 2+ on the right side, and 2+ on the left side.       Radial pulses are 2+ on the right side, and 2+ on the left side.        Dorsalis pedis pulses are 2+ on the right side, and 2+ on the left side.        Posterior tibial pulses are 2+ on the right side, and 2+ on the left side.   Pulmonary/Chest: Effort normal and breath sounds normal. No respiratory distress. He has no wheezes. He has no rales.   Abdominal: Soft. Bowel sounds are normal. He exhibits no distension and no mass. There is no tenderness. There is no rebound and no guarding.   Musculoskeletal: He exhibits no edema.   Neurological: He is alert and oriented to person, place, and time. No cranial nerve deficit.   Skin: Skin is warm and dry. He is not diaphoretic. No pallor.   Psychiatric: He has a normal mood and affect. His behavior is normal. Judgment and thought content normal.   Nursing note and vitals reviewed.      Assessment:     1. Atrial fibrillation with rapid ventricular response (CMS-[facility])  diltiazem CD (CARDIZEM CD) 120 MG CAPSULE SR 24 HR    HOLTER MONITOR / EVENT RECORDER    flecainide (TAMBOCOR) 50 MG tablet   2. Essential hypertension  diltiazem CD (CARDIZEM CD) 120 MG CAPSULE SR 24 HR    HOLTER MONITOR / EVENT RECORDER   3. Metastatic melanoma (CMS-[facility])     4. Hypokalemia     5. Unstable angina pectoris (CMS-[facility])  flecainide  (TAMBOCOR) 50 MG tablet       Medical Decision Making:  Today's Assessment / Status / Plan:     67-year-old male with stage IV metastatic melanoma being treated with chemotherapy as well as paroxysmal atrial fibrillation with calcium seen on a CT scan. His chest pain is likely happen in the setting of A. Fib with rapid ventricular response. I would like to start him on flecainide 50 twice a day as well as diltiazem. We will also get a an eventr recorder and see how often he is having any runs of atrial fibrillation.    1. A-fib    - start flecainide 50 BID    - start dilt 120 CD    - event recorder    2. 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 Stage IV melanoma    - defer    Thank for you allowing me to take part in your patient's care, please call should you have any questions or would like to discuss this patient.

## 2017-11-22 ENCOUNTER — TELEPHONE (OUTPATIENT)
Dept: CARDIOLOGY | Facility: MEDICAL CENTER | Age: 67
End: 2017-11-22

## 2017-11-22 ENCOUNTER — NON-PROVIDER VISIT (OUTPATIENT)
Dept: CARDIOLOGY | Facility: MEDICAL CENTER | Age: 67
End: 2017-11-22
Payer: MEDICARE

## 2017-11-22 DIAGNOSIS — I10 ESSENTIAL HYPERTENSION: ICD-10-CM

## 2017-11-22 DIAGNOSIS — I47.29 NSVT (NONSUSTAINED VENTRICULAR TACHYCARDIA) (HCC): ICD-10-CM

## 2017-11-22 DIAGNOSIS — I48.91 ATRIAL FIBRILLATION WITH RAPID VENTRICULAR RESPONSE (HCC): ICD-10-CM

## 2017-11-22 DIAGNOSIS — I48.0 PAF (PAROXYSMAL ATRIAL FIBRILLATION) (HCC): ICD-10-CM

## 2017-12-13 PROCEDURE — 0296T PR EXT ECG > 48HR TO 21 DAY RCRD W/CONECT INTL RCRD: CPT | Performed by: INTERNAL MEDICINE

## 2017-12-13 PROCEDURE — 0298T PR EXT ECG > 48HR TO 21 DAY REVIEW AND INTERPRETATN: CPT | Performed by: INTERNAL MEDICINE

## 2017-12-15 ENCOUNTER — TELEPHONE (OUTPATIENT)
Dept: CARDIOLOGY | Facility: MEDICAL CENTER | Age: 67
End: 2017-12-15

## 2017-12-15 DIAGNOSIS — I48.91 ATRIAL FIBRILLATION WITH RAPID VENTRICULAR RESPONSE (HCC): ICD-10-CM

## 2017-12-15 DIAGNOSIS — I20.0 UNSTABLE ANGINA PECTORIS (HCC): ICD-10-CM

## 2017-12-15 RX ORDER — FLECAINIDE ACETATE 100 MG/1
100 TABLET ORAL 2 TIMES DAILY
Qty: 180 TAB | Refills: 3 | Status: SHIPPED | OUTPATIENT
Start: 2017-12-15 | End: 2017-12-18

## 2017-12-15 NOTE — TELEPHONE ENCOUNTER
Called patient with [facility] results. Pt outside. MD change in medication request discussed with Orinne. She states she wrote it down. FU appt on 12/18 confirmed with her at this time, date and time. She states they will be here. Encouraged to start new dose this evening. Orinne states understanding of all information. MD to go over results with patient at appt in more detail.

## 2017-12-18 ENCOUNTER — OFFICE VISIT (OUTPATIENT)
Dept: CARDIOLOGY | Facility: MEDICAL CENTER | Age: 67
End: 2017-12-18
Payer: MEDICARE

## 2017-12-18 ENCOUNTER — HOSPITAL ENCOUNTER (OUTPATIENT)
Dept: RADIOLOGY | Facility: MEDICAL CENTER | Age: 67
End: 2017-12-18
Attending: INTERNAL MEDICINE
Payer: MEDICARE

## 2017-12-18 VITALS
OXYGEN SATURATION: 96 % | WEIGHT: 220 LBS | BODY MASS INDEX: 29.8 KG/M2 | DIASTOLIC BLOOD PRESSURE: 78 MMHG | SYSTOLIC BLOOD PRESSURE: 120 MMHG | HEIGHT: 72 IN | HEART RATE: 80 BPM

## 2017-12-18 DIAGNOSIS — I48.91 ATRIAL FIBRILLATION WITH RAPID VENTRICULAR RESPONSE (HCC): ICD-10-CM

## 2017-12-18 DIAGNOSIS — I20.0 UNSTABLE ANGINA PECTORIS (HCC): ICD-10-CM

## 2017-12-18 DIAGNOSIS — C43.4 MALIGNANT MELANOMA OF SKIN OF SCALP AND NECK (HCC): ICD-10-CM

## 2017-12-18 DIAGNOSIS — C43.9 METASTATIC MELANOMA (HCC): ICD-10-CM

## 2017-12-18 PROCEDURE — 99215 OFFICE O/P EST HI 40 MIN: CPT | Performed by: INTERNAL MEDICINE

## 2017-12-18 PROCEDURE — 71260 CT THORAX DX C+: CPT

## 2017-12-18 RX ORDER — FELODIPINE 5 MG/1
5 TABLET, EXTENDED RELEASE ORAL DAILY
Qty: 30 TAB | Refills: 11 | Status: SHIPPED | OUTPATIENT
Start: 2017-12-18 | End: 2018-04-09

## 2017-12-18 RX ORDER — FLECAINIDE ACETATE 50 MG/1
50 TABLET ORAL 2 TIMES DAILY
Qty: 60 TAB | Refills: 11 | Status: SHIPPED | OUTPATIENT
Start: 2017-12-18 | End: 2018-10-15

## 2017-12-18 RX ORDER — ASPIRIN 81 MG/1
81 TABLET ORAL DAILY
Qty: 100 TAB | Refills: 3
Start: 2017-12-18 | End: 2019-11-20

## 2017-12-18 ASSESSMENT — ENCOUNTER SYMPTOMS
SPUTUM PRODUCTION: 0
PND: 0
WEAKNESS: 0
PALPITATIONS: 0
MUSCULOSKELETAL NEGATIVE: 1
NEUROLOGICAL NEGATIVE: 1
COUGH: 0
CHILLS: 0
WHEEZING: 0
CLAUDICATION: 0
DIZZINESS: 0
FEVER: 0
CONSTITUTIONAL NEGATIVE: 1
EYES NEGATIVE: 1
LOSS OF CONSCIOUSNESS: 0
HEMOPTYSIS: 0
CARDIOVASCULAR NEGATIVE: 1
SHORTNESS OF BREATH: 0
GASTROINTESTINAL NEGATIVE: 1
SORE THROAT: 0
STRIDOR: 0
BRUISES/BLEEDS EASILY: 0
RESPIRATORY NEGATIVE: 1
ORTHOPNEA: 0

## 2017-12-18 NOTE — PROGRESS NOTES
Subjective:   Artem Joseph is a 67 y.o. male who presents today As a follow-up for his atrial fibrillation. We did increase his flecainide to 100 which made him dizzy and so therefore he decreased it back down to 50 he is feeling significantly improved. He also continues on the diltiazem but no aspirin. He is pending a evaluation for his  Myeloma.    Past Medical History:   Diagnosis Date   • Cancer (CMS-HCC)     melanoma in 2007, original 1989 from back of neck   • Heart burn    • Hypertension    • Indigestion      Past Surgical History:   Procedure Laterality Date   • COLONOSCOPY - ENDO  10/9/2016    Procedure: COLONOSCOPY - ENDO;  Surgeon: William Mitchell M.D.;  Location: SURGERY [facility];  Service:    • RECOVERY  7/26/2016    Procedure: CT-CT Guided abdominal mass-;  Surgeon: [facility];  Location: SURGERY PRE-POST PROC UNIT [facility];  Service:    • MASS EXCISION GENERAL  3/9/2012    Performed by MARLEN KEEN at SURGERY SAME DAY [facility] ORS   • OTHER      lymph nodes back of neck     History reviewed. No pertinent family history.  History   Smoking Status   • Current Every Day Smoker   • Packs/day: 0.50   • Years: 40.00   • Types: Cigarettes   Smokeless Tobacco   • Never Used     Allergies   Allergen Reactions   • Sulfa Drugs      Severe muscle pains     Outpatient Encounter Prescriptions as of 12/18/2017   Medication Sig Dispense Refill   • aspirin 81 MG EC tablet Take 1 Tab by mouth every day. 100 Tab 3   • felodipine (PLENDIL) 5 MG TABLET SR 24 HR Take 1 Tab by mouth every day. 30 Tab 11   • flecainide (TAMBOCOR) 50 MG tablet Take 1 Tab by mouth 2 times a day. 60 Tab 11   • levothyroxine (SYNTHROID) 137 MCG Tab Take 137 mcg by mouth Every morning on an empty stomach.     • predniSONE (DELTASONE) 10 MG Tab Take 10 mg by mouth every day.     • tamsulosin (FLOMAX) 0.4 MG capsule Take 0.4 mg by mouth ONE-HALF HOUR AFTER BREAKFAST.     • diltiazem CD (CARDIZEM CD) 120 MG CAPSULE SR 24 HR  Take 1 Cap by mouth every day. 30 Cap 11   • felodipine (PLENDIL) 5 MG TABLET SR 24 HR Take 2.5 mg by mouth every day.     • loperamide (IMODIUM) 2 MG Cap Take 1 Cap by mouth 4 times a day as needed for Diarrhea. 30 Cap 3   • simvastatin (ZOCOR) 20 MG TABS Take 20 mg by mouth every morning.     • omeprazole (PRILOSEC) 20 MG CPDR Take 20 mg by mouth every day.     • Multiple Vitamin (MULTIVITAMIN PO) Take  by mouth every day.     • hydrocodone-acetaminophen (NORCO) 5-325 MG TABS per tablet Take 1-2 Tabs by mouth every four hours as needed.     • [DISCONTINUED] flecainide (TAMBOCOR) 100 MG Tab Take 1 Tab by mouth 2 times a day. 180 Tab 3   • [DISCONTINUED] diltiazem (CARDIZEM) 30 MG Tab Take 1 Tab by mouth as needed. 30 Tab 3   • [DISCONTINUED] aspirin EC 81 MG EC tablet Take 1 Tab by mouth every day. (Patient not taking: Reported on 11/6/2017) 100 Tab 3   • [DISCONTINUED] valsartan-hydrochlorothiazide (DIOVAN HCT) 320-12.5 MG per tablet Take 1 Tab by mouth every day.       Facility-Administered Encounter Medications as of 12/18/2017   Medication Dose Route Frequency Provider Last Rate Last Dose   • iohexol (OMNIPAQUE) 350 mg/mL  100 mL Intravenous Once Jt SONIA Agee M.D.         Review of Systems   Constitutional: Negative.  Negative for chills, fever and malaise/fatigue.   HENT: Negative.  Negative for sore throat.    Eyes: Negative.    Respiratory: Negative.  Negative for cough, hemoptysis, sputum production, shortness of breath, wheezing and stridor.    Cardiovascular: Negative.  Negative for chest pain, palpitations, orthopnea, claudication, leg swelling and PND.   Gastrointestinal: Negative.    Genitourinary: Negative.    Musculoskeletal: Negative.    Skin: Negative.    Neurological: Negative.  Negative for dizziness, loss of consciousness and weakness.   Endo/Heme/Allergies: Negative.  Does not bruise/bleed easily.   All other systems reviewed and are negative.       Objective:   /78   Pulse 80   Ht  1.829 m (6')   Wt 99.8 kg (220 lb)   SpO2 96%   BMI 29.84 kg/m²     Physical Exam   Constitutional: He is oriented to person, place, and time. He appears well-developed and well-nourished. No distress.   HENT:   Head: Normocephalic.   Mouth/Throat: Oropharynx is clear and moist.   Eyes: EOM are normal. Pupils are equal, round, and reactive to light. Right eye exhibits no discharge. Left eye exhibits no discharge. No scleral icterus.   Neck: Normal range of motion. Neck supple. No JVD present. No tracheal deviation present.   Cardiovascular: Normal rate, regular rhythm, S1 normal, S2 normal, normal heart sounds, intact distal pulses and normal pulses.  Exam reveals no gallop, no S3, no S4 and no friction rub.    No murmur heard.   No systolic murmur is present    No diastolic murmur is present   Pulses:       Carotid pulses are 2+ on the right side, and 2+ on the left side.       Radial pulses are 2+ on the right side, and 2+ on the left side.        Dorsalis pedis pulses are 2+ on the right side, and 2+ on the left side.        Posterior tibial pulses are 2+ on the right side, and 2+ on the left side.   Pulmonary/Chest: Effort normal and breath sounds normal. No respiratory distress. He has no wheezes. He has no rales.   Abdominal: Soft. Bowel sounds are normal. He exhibits no distension and no mass. There is no tenderness. There is no rebound and no guarding.   Musculoskeletal: He exhibits no edema.   Neurological: He is alert and oriented to person, place, and time. No cranial nerve deficit.   Skin: Skin is warm and dry. He is not diaphoretic. No pallor.   Psychiatric: He has a normal mood and affect. His behavior is normal. Judgment and thought content normal.   Nursing note and vitals reviewed.      Assessment:     1. Atrial fibrillation with rapid ventricular response (CMS-HCC)  aspirin 81 MG EC tablet    felodipine (PLENDIL) 5 MG TABLET SR 24 HR    flecainide (TAMBOCOR) 50 MG tablet   2. Unstable angina  pectoris (CMS-HCC)  aspirin 81 MG EC tablet    felodipine (PLENDIL) 5 MG TABLET SR 24 HR    flecainide (TAMBOCOR) 50 MG tablet   3. Metastatic melanoma (CMS-HCC)         Medical Decision Making:  Today's Assessment / Status / Plan:     67-year-old male with stage IV metastatic melanoma and paroxysmal atrial fibrillation. We will keep his flecainide 50 twice a day as well as diltiazem and aspirin. He can increase this to 100 as needed for his breakthrough atrial fibrillation. Based on his metastatic melanoma and the risk of bleeding of this I would not offer any anticoagulant at this point possibly keep him on aspirin.    1. A-fib    - cont flecainide 50 BID    - cont dilt 120 CD    - event recorder showed breakthrough a-fib with abberancy     2. 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 Stage IV melanoma    - defer    Thank for you allowing me to take part in your patient's care, please call should you have any questions or would like to discuss this patient.

## 2017-12-18 NOTE — LETTER
[facility]   [address], [address]  [place] [address]  Phone: [phone]  Fax: [phone]              Artem Joseph  1950    Encounter Date: 12/18/2017    Boogie Ramirez M.D.          PROGRESS NOTE:  Subjective:   Artem Joseph is a 67 y.o. male who presents today As a follow-up for his atrial fibrillation. We did increase his flecainide to 100 which made him dizzy and so therefore he decreased it back down to 50 he is feeling significantly improved. He also continues on the diltiazem but no aspirin. He is pending a evaluation for his  Myeloma.    Past Medical History:   Diagnosis Date   • Cancer (CMS-HCC)     melanoma in 2007, original 1989 from back of neck   • Heart burn    • Hypertension    • Indigestion      Past Surgical History:   Procedure Laterality Date   • COLONOSCOPY - ENDO  10/9/2016    Procedure: COLONOSCOPY - ENDO;  Surgeon: William Mitchell M.D.;  Location: SURGERY [facility];  Service:    • RECOVERY  7/26/2016    Procedure: CT-CT Guided abdominal mass-;  Surgeon: [facility];  Location: SURGERY PRE-POST PROC UNIT [facility];  Service:    • MASS EXCISION GENERAL  3/9/2012    Performed by MARLEN KEEN at SURGERY SAME DAY [facility]   • OTHER      lymph nodes back of neck     History reviewed. No pertinent family history.  History   Smoking Status   • Current Every Day Smoker   • Packs/day: 0.50   • Years: 40.00   • Types: Cigarettes   Smokeless Tobacco   • Never Used     Allergies   Allergen Reactions   • Sulfa Drugs      Severe muscle pains     Outpatient Encounter Prescriptions as of 12/18/2017   Medication Sig Dispense Refill   • aspirin 81 MG EC tablet Take 1 Tab by mouth every day. 100 Tab 3   • felodipine (PLENDIL) 5 MG TABLET SR 24 HR Take 1 Tab by mouth every day. 30 Tab 11   • flecainide (TAMBOCOR) 50 MG tablet Take 1 Tab by mouth 2 times a day. 60 Tab 11   • levothyroxine (SYNTHROID) 137 MCG Tab Take  137 mcg by mouth Every morning on an empty stomach.     • predniSONE (DELTASONE) 10 MG Tab Take 10 mg by mouth every day.     • tamsulosin (FLOMAX) 0.4 MG capsule Take 0.4 mg by mouth ONE-HALF HOUR AFTER BREAKFAST.     • diltiazem CD (CARDIZEM CD) 120 MG CAPSULE SR 24 HR Take 1 Cap by mouth every day. 30 Cap 11   • felodipine (PLENDIL) 5 MG TABLET SR 24 HR Take 2.5 mg by mouth every day.     • loperamide (IMODIUM) 2 MG Cap Take 1 Cap by mouth 4 times a day as needed for Diarrhea. 30 Cap 3   • simvastatin (ZOCOR) 20 MG TABS Take 20 mg by mouth every morning.     • omeprazole (PRILOSEC) 20 MG CPDR Take 20 mg by mouth every day.     • Multiple Vitamin (MULTIVITAMIN PO) Take  by mouth every day.     • hydrocodone-acetaminophen (NORCO) 5-325 MG TABS per tablet Take 1-2 Tabs by mouth every four hours as needed.     • [DISCONTINUED] flecainide (TAMBOCOR) 100 MG Tab Take 1 Tab by mouth 2 times a day. 180 Tab 3   • [DISCONTINUED] diltiazem (CARDIZEM) 30 MG Tab Take 1 Tab by mouth as needed. 30 Tab 3   • [DISCONTINUED] aspirin EC 81 MG EC tablet Take 1 Tab by mouth every day. (Patient not taking: Reported on 11/6/2017) 100 Tab 3   • [DISCONTINUED] valsartan-hydrochlorothiazide (DIOVAN HCT) 320-12.5 MG per tablet Take 1 Tab by mouth every day.       Facility-Administered Encounter Medications as of 12/18/2017   Medication Dose Route Frequency Provider Last Rate Last Dose   • iohexol (OMNIPAQUE) 350 mg/mL  100 mL Intravenous Once Jt Agee M.D.         Review of Systems   Constitutional: Negative.  Negative for chills, fever and malaise/fatigue.   HENT: Negative.  Negative for sore throat.    Eyes: Negative.    Respiratory: Negative.  Negative for cough, hemoptysis, sputum production, shortness of breath, wheezing and stridor.    Cardiovascular: Negative.  Negative for chest pain, palpitations, orthopnea, claudication, leg swelling and PND.   Gastrointestinal: Negative.    Genitourinary: Negative.    Musculoskeletal:  Negative.    Skin: Negative.    Neurological: Negative.  Negative for dizziness, loss of consciousness and weakness.   Endo/Heme/Allergies: Negative.  Does not bruise/bleed easily.   All other systems reviewed and are negative.       Objective:   /78   Pulse 80   Ht 1.829 m (6')   Wt 99.8 kg (220 lb)   SpO2 96%   BMI 29.84 kg/m²      Physical Exam   Constitutional: He is oriented to person, place, and time. He appears well-developed and well-nourished. No distress.   HENT:   Head: Normocephalic.   Mouth/Throat: Oropharynx is clear and moist.   Eyes: EOM are normal. Pupils are equal, round, and reactive to light. Right eye exhibits no discharge. Left eye exhibits no discharge. No scleral icterus.   Neck: Normal range of motion. Neck supple. No JVD present. No tracheal deviation present.   Cardiovascular: Normal rate, regular rhythm, S1 normal, S2 normal, normal heart sounds, intact distal pulses and normal pulses.  Exam reveals no gallop, no S3, no S4 and no friction rub.    No murmur heard.   No systolic murmur is present    No diastolic murmur is present   Pulses:       Carotid pulses are 2+ on the right side, and 2+ on the left side.       Radial pulses are 2+ on the right side, and 2+ on the left side.        Dorsalis pedis pulses are 2+ on the right side, and 2+ on the left side.        Posterior tibial pulses are 2+ on the right side, and 2+ on the left side.   Pulmonary/Chest: Effort normal and breath sounds normal. No respiratory distress. He has no wheezes. He has no rales.   Abdominal: Soft. Bowel sounds are normal. He exhibits no distension and no mass. There is no tenderness. There is no rebound and no guarding.   Musculoskeletal: He exhibits no edema.   Neurological: He is alert and oriented to person, place, and time. No cranial nerve deficit.   Skin: Skin is warm and dry. He is not diaphoretic. No pallor.   Psychiatric: He has a normal mood and affect. His behavior is normal. Judgment and  thought content normal.   Nursing note and vitals reviewed.      Assessment:     1. Atrial fibrillation with rapid ventricular response (CMS-HCC)  aspirin 81 MG EC tablet    felodipine (PLENDIL) 5 MG TABLET SR 24 HR    flecainide (TAMBOCOR) 50 MG tablet   2. Unstable angina pectoris (CMS-HCC)  aspirin 81 MG EC tablet    felodipine (PLENDIL) 5 MG TABLET SR 24 HR    flecainide (TAMBOCOR) 50 MG tablet   3. Metastatic melanoma (CMS-HCC)         Medical Decision Making:  Today's Assessment / Status / Plan:     67-year-old male with stage IV metastatic melanoma and paroxysmal atrial fibrillation. We will keep his flecainide 50 twice a day as well as diltiazem and aspirin. He can increase this to 100 as needed for his breakthrough atrial fibrillation. Based on his metastatic melanoma and the risk of bleeding of this I would not offer any anticoagulant at this point possibly keep him on aspirin.    1. A-fib    -  cont flecainide 50 BID    -  cont dilt 120 CD    - event recorder showed breakthrough a-fib with abberancy     2. 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 Stage IV melanoma    - defer    Thank for you allowing me to take part in your patient's care, please call should you have any questions or would like to discuss this patient.          Yon Madison M.D.  [address] [place] [id number]  VIA Facsimile: [phone]

## 2018-04-09 ENCOUNTER — OFFICE VISIT (OUTPATIENT)
Dept: CARDIOLOGY | Facility: MEDICAL CENTER | Age: 68
End: 2018-04-09
Payer: MEDICARE

## 2018-04-09 VITALS
HEIGHT: 72 IN | OXYGEN SATURATION: 95 % | BODY MASS INDEX: 29.61 KG/M2 | DIASTOLIC BLOOD PRESSURE: 68 MMHG | HEART RATE: 80 BPM | SYSTOLIC BLOOD PRESSURE: 118 MMHG | WEIGHT: 218.6 LBS

## 2018-04-09 DIAGNOSIS — C43.9 METASTATIC MELANOMA (HCC): ICD-10-CM

## 2018-04-09 DIAGNOSIS — I20.0 UNSTABLE ANGINA PECTORIS (HCC): ICD-10-CM

## 2018-04-09 PROCEDURE — 99214 OFFICE O/P EST MOD 30 MIN: CPT | Performed by: INTERNAL MEDICINE

## 2018-04-09 RX ORDER — HYDROCORTISONE 10 MG/1
10 TABLET ORAL 3 TIMES DAILY
COMMUNITY
Start: 2018-04-01 | End: 2018-06-25 | Stop reason: SDUPTHER

## 2018-04-09 RX ORDER — FLECAINIDE ACETATE 100 MG/1
100 TABLET ORAL 2 TIMES DAILY
COMMUNITY
Start: 2018-04-01 | End: 2019-05-21 | Stop reason: SDUPTHER

## 2018-04-09 RX ORDER — LEVOTHYROXINE SODIUM 0.15 MG/1
TABLET ORAL
COMMUNITY
Start: 2018-03-23 | End: 2018-10-15

## 2018-04-09 RX ORDER — HYDROCODONE BITARTRATE AND ACETAMINOPHEN 10; 325 MG/1; MG/1
TABLET ORAL
COMMUNITY
Start: 2018-03-11 | End: 2018-10-15

## 2018-04-09 ASSESSMENT — ENCOUNTER SYMPTOMS
SHORTNESS OF BREATH: 0
PALPITATIONS: 0
HEMOPTYSIS: 0
PND: 0
RESPIRATORY NEGATIVE: 1
CLAUDICATION: 0
WEAKNESS: 0
GASTROINTESTINAL NEGATIVE: 1
MUSCULOSKELETAL NEGATIVE: 1
FEVER: 0
CHILLS: 0
EYES NEGATIVE: 1
NEUROLOGICAL NEGATIVE: 1
SORE THROAT: 0
STRIDOR: 0
BRUISES/BLEEDS EASILY: 0
CARDIOVASCULAR NEGATIVE: 1
DIZZINESS: 0
LOSS OF CONSCIOUSNESS: 0
WHEEZING: 0
COUGH: 0
SPUTUM PRODUCTION: 0
CONSTITUTIONAL NEGATIVE: 1
ORTHOPNEA: 0

## 2018-04-09 NOTE — LETTER
[facility]   [address] [facility] [address]  [place] [address]  Phone: [phone]  Fax: [phone]              Artem Joseph  1950    Encounter Date: 4/9/2018    Boogie Ramirez M.D.          PROGRESS NOTE:  Subjective:   Artem Joseph is a 67 y.o. male who presents today As a follow-up for his atrial fibrillation. Is a follow-up for his paroxysmal atrial fibrillation. He's only had 2 incidences of atrial fibrillation and is taking the 50 mg of flecainide and its gone away within 10-20 minutes. His melanoma is stable. He's not currently undergoing any chemotherapy. He is not having chest pain palpitations PND orthopnea.    Past Medical History:   Diagnosis Date   • Cancer (CMS-HCC)     melanoma in 2007, original 1989 from back of neck   • Heart burn    • Hypertension    • Indigestion      Past Surgical History:   Procedure Laterality Date   • COLONOSCOPY - ENDO  10/9/2016    Procedure: COLONOSCOPY - ENDO;  Surgeon: William Mitchell M.D.;  Location: SURGERY [facility];  Service:    • RECOVERY  7/26/2016    Procedure: CT-CT Guided abdominal mass-;  Surgeon: [facility];  Location: SURGERY PRE-POST PROC UNIT [facility];  Service:    • MASS EXCISION GENERAL  3/9/2012    Performed by MARLEN KEEN at SURGERY SAME DAY [facility] ORS   • OTHER      lymph nodes back of neck     History reviewed. No pertinent family history.  History   Smoking Status   • Current Every Day Smoker   • Packs/day: 0.50   • Years: 40.00   • Types: Cigarettes   Smokeless Tobacco   • Never Used     Allergies   Allergen Reactions   • Sulfa Drugs      Severe muscle pains     Outpatient Encounter Prescriptions as of 4/9/2018   Medication Sig Dispense Refill   • hydrocortisone (CORTEF) 10 MG Tab      • levothyroxine (SYNTHROID) 150 MCG Tab      • flecainide (TAMBOCOR) 100 MG Tab      • HYDROcodone/acetaminophen (NORCO)  MG Tab      • aspirin 81 MG EC tablet Take 1 Tab by  mouth every day. 100 Tab 3   • predniSONE (DELTASONE) 10 MG Tab Take 5 mg by mouth every day.     • tamsulosin (FLOMAX) 0.4 MG capsule Take 0.4 mg by mouth ONE-HALF HOUR AFTER BREAKFAST.     • diltiazem CD (CARDIZEM CD) 120 MG CAPSULE SR 24 HR Take 1 Cap by mouth every day. 30 Cap 11   • loperamide (IMODIUM) 2 MG Cap Take 1 Cap by mouth 4 times a day as needed for Diarrhea. 30 Cap 3   • simvastatin (ZOCOR) 20 MG TABS Take 20 mg by mouth every morning.     • omeprazole (PRILOSEC) 20 MG CPDR Take 20 mg by mouth every day.     • Multiple Vitamin (MULTIVITAMIN PO) Take  by mouth every day.     • flecainide (TAMBOCOR) 50 MG tablet Take 1 Tab by mouth 2 times a day. 60 Tab 11   • [DISCONTINUED] felodipine (PLENDIL) 5 MG TABLET SR 24 HR Take 1 Tab by mouth every day. (Patient taking differently: Take 2.5 mg by mouth every day.) 30 Tab 11   • [DISCONTINUED] levothyroxine (SYNTHROID) 137 MCG Tab Take 137 mcg by mouth Every morning on an empty stomach.     • [DISCONTINUED] felodipine (PLENDIL) 5 MG TABLET SR 24 HR Take 2.5 mg by mouth every day.     • [DISCONTINUED] hydrocodone-acetaminophen (NORCO) 5-325 MG TABS per tablet Take 1-2 Tabs by mouth every four hours as needed.       No facility-administered encounter medications on file as of 4/9/2018.      Review of Systems   Constitutional: Negative.  Negative for chills, fever and malaise/fatigue.   HENT: Negative.  Negative for sore throat.    Eyes: Negative.    Respiratory: Negative.  Negative for cough, hemoptysis, sputum production, shortness of breath, wheezing and stridor.    Cardiovascular: Negative.  Negative for chest pain, palpitations, orthopnea, claudication, leg swelling and PND.   Gastrointestinal: Negative.    Genitourinary: Negative.    Musculoskeletal: Negative.    Skin: Negative.    Neurological: Negative.  Negative for dizziness, loss of consciousness and weakness.   Endo/Heme/Allergies: Negative.  Does not bruise/bleed easily.   All other systems reviewed  and are negative.       Objective:   /68   Pulse 80   Ht 1.829 m (6')   Wt 99.2 kg (218 lb 9.6 oz)   SpO2 95%   BMI 29.65 kg/m²      Physical Exam   Constitutional: He is oriented to person, place, and time. He appears well-developed and well-nourished. No distress.   HENT:   Head: Normocephalic.   Mouth/Throat: Oropharynx is clear and moist.   Eyes: EOM are normal. Pupils are equal, round, and reactive to light. Right eye exhibits no discharge. Left eye exhibits no discharge. No scleral icterus.   Neck: Normal range of motion. Neck supple. No JVD present. No tracheal deviation present.   Cardiovascular: Normal rate, regular rhythm, S1 normal, S2 normal, normal heart sounds, intact distal pulses and normal pulses.  Exam reveals no gallop, no S3, no S4 and no friction rub.    No murmur heard.   No systolic murmur is present    No diastolic murmur is present   Pulses:       Carotid pulses are 2+ on the right side, and 2+ on the left side.       Radial pulses are 2+ on the right side, and 2+ on the left side.        Dorsalis pedis pulses are 2+ on the right side, and 2+ on the left side.        Posterior tibial pulses are 2+ on the right side, and 2+ on the left side.   Pulmonary/Chest: Effort normal and breath sounds normal. No respiratory distress. He has no wheezes. He has no rales.   Abdominal: Soft. Bowel sounds are normal. He exhibits no distension and no mass. There is no tenderness. There is no rebound and no guarding.   Musculoskeletal: He exhibits no edema.   Neurological: He is alert and oriented to person, place, and time. No cranial nerve deficit.   Skin: Skin is warm and dry. He is not diaphoretic. No pallor.   Psychiatric: He has a normal mood and affect. His behavior is normal. Judgment and thought content normal.   Nursing note and vitals reviewed.      Assessment:     1. Metastatic melanoma (CMS-HCC)     2. Unstable angina pectoris (CMS-HCC)         Medical Decision Making:  Today's  Assessment / Status / Plan:     67-year-old male with stage IV metastatic melanoma and paroxysmal atrial fibrillation. We will have him take the flecainide as needed. He will call us if he has any issues with A. fib but does not resolve.    1. A-fib    - cont flecainide 50 BID PRN    - cont dilt 120 CD     2. 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 Stage IV melanoma    - defer    Thank for you allowing me to take part in your patient's care, please call should you have any questions or would like to discuss this patient.      Physical Exam   Constitutional: He is oriented to person, place, and time. He appears well-developed and well-nourished. No distress.   HENT:   Head: Normocephalic.   Mouth/Throat: Oropharynx is clear and moist.   Eyes: EOM are normal. Pupils are equal, round, and reactive to light. Right eye exhibits no discharge. Left eye exhibits no discharge. No scleral icterus.   Neck: Normal range of motion. Neck supple. No JVD present. No tracheal deviation present.   Cardiovascular: Normal rate, regular rhythm, S1 normal, S2 normal, normal heart sounds, intact distal pulses and normal pulses.  Exam reveals no gallop, no S3, no S4 and no friction rub.    No murmur heard.   No systolic murmur is present    No diastolic murmur is present   Pulses:       Carotid pulses are 2+ on the right side, and 2+ on the left side.       Radial pulses are 2+ on the right side, and 2+ on the left side.        Dorsalis pedis pulses are 2+ on the right side, and 2+ on the left side.        Posterior tibial pulses are 2+ on the right side, and 2+ on the left side.   Pulmonary/Chest: Effort normal and breath sounds normal. No respiratory distress. He has no wheezes. He has no rales.   Abdominal: Soft. Bowel sounds are normal. He exhibits no distension and no mass. There is no tenderness. There is no rebound and no guarding.   Musculoskeletal: He exhibits no edema.   Neurological: He is alert and  oriented to person, place, and time. No cranial nerve deficit.   Skin: Skin is warm and dry. He is not diaphoretic. No pallor.   Psychiatric: He has a normal mood and affect. His behavior is normal. Judgment and thought content normal.   Nursing note and vitals reviewed.          Yon Madison M.D.  [address] [place] [address]  VIA Facsimile: [phone]

## 2018-04-09 NOTE — PROGRESS NOTES
Subjective:   Artem Joseph is a 67 y.o. male who presents today As a follow-up for his atrial fibrillation. Is a follow-up for his paroxysmal atrial fibrillation. He's only had 2 incidences of atrial fibrillation and is taking the 50 mg of flecainide and its gone away within 10-20 minutes. His melanoma is stable. He's not currently undergoing any chemotherapy. He is not having chest pain palpitations PND orthopnea.    Past Medical History:   Diagnosis Date   • Cancer (CMS-HCC)     melanoma in 2007, original 1989 from back of neck   • Heart burn    • Hypertension    • Indigestion      Past Surgical History:   Procedure Laterality Date   • COLONOSCOPY - ENDO  10/9/2016    Procedure: COLONOSCOPY - ENDO;  Surgeon: William Mitchell M.D.;  Location: SURGERY [facility];  Service:    • RECOVERY  7/26/2016    Procedure: CT-CT Guided abdominal mass-;  Surgeon: Recoveryonly Surgery;  Location: SURGERY PRE-POST PROC UNIT [facility] [place];  Service:    • MASS EXCISION GENERAL  3/9/2012    Performed by MARLEN KEEN at SURGERY SAME DAY [facility]   • OTHER      lymph nodes back of neck     History reviewed. No pertinent family history.  History   Smoking Status   • Current Every Day Smoker   • Packs/day: 0.50   • Years: 40.00   • Types: Cigarettes   Smokeless Tobacco   • Never Used     Allergies   Allergen Reactions   • Sulfa Drugs      Severe muscle pains     Outpatient Encounter Prescriptions as of 4/9/2018   Medication Sig Dispense Refill   • hydrocortisone (CORTEF) 10 MG Tab      • levothyroxine (SYNTHROID) 150 MCG Tab      • flecainide (TAMBOCOR) 100 MG Tab      • HYDROcodone/acetaminophen (NORCO)  MG Tab      • aspirin 81 MG EC tablet Take 1 Tab by mouth every day. 100 Tab 3   • predniSONE (DELTASONE) 10 MG Tab Take 5 mg by mouth every day.     • tamsulosin (FLOMAX) 0.4 MG capsule Take 0.4 mg by mouth ONE-HALF HOUR AFTER BREAKFAST.     • diltiazem CD (CARDIZEM CD) 120 MG CAPSULE SR 24 HR Take 1 Cap by mouth  every day. 30 Cap 11   • loperamide (IMODIUM) 2 MG Cap Take 1 Cap by mouth 4 times a day as needed for Diarrhea. 30 Cap 3   • simvastatin (ZOCOR) 20 MG TABS Take 20 mg by mouth every morning.     • omeprazole (PRILOSEC) 20 MG CPDR Take 20 mg by mouth every day.     • Multiple Vitamin (MULTIVITAMIN PO) Take  by mouth every day.     • flecainide (TAMBOCOR) 50 MG tablet Take 1 Tab by mouth 2 times a day. 60 Tab 11   • [DISCONTINUED] felodipine (PLENDIL) 5 MG TABLET SR 24 HR Take 1 Tab by mouth every day. (Patient taking differently: Take 2.5 mg by mouth every day.) 30 Tab 11   • [DISCONTINUED] levothyroxine (SYNTHROID) 137 MCG Tab Take 137 mcg by mouth Every morning on an empty stomach.     • [DISCONTINUED] felodipine (PLENDIL) 5 MG TABLET SR 24 HR Take 2.5 mg by mouth every day.     • [DISCONTINUED] hydrocodone-acetaminophen (NORCO) 5-325 MG TABS per tablet Take 1-2 Tabs by mouth every four hours as needed.       No facility-administered encounter medications on file as of 4/9/2018.      Review of Systems   Constitutional: Negative.  Negative for chills, fever and malaise/fatigue.   HENT: Negative.  Negative for sore throat.    Eyes: Negative.    Respiratory: Negative.  Negative for cough, hemoptysis, sputum production, shortness of breath, wheezing and stridor.    Cardiovascular: Negative.  Negative for chest pain, palpitations, orthopnea, claudication, leg swelling and PND.   Gastrointestinal: Negative.    Genitourinary: Negative.    Musculoskeletal: Negative.    Skin: Negative.    Neurological: Negative.  Negative for dizziness, loss of consciousness and weakness.   Endo/Heme/Allergies: Negative.  Does not bruise/bleed easily.   All other systems reviewed and are negative.       Objective:   /68   Pulse 80   Ht 1.829 m (6')   Wt 99.2 kg (218 lb 9.6 oz)   SpO2 95%   BMI 29.65 kg/m²     Physical Exam   Constitutional: He is oriented to person, place, and time. He appears well-developed and well-nourished.  No distress.   HENT:   Head: Normocephalic.   Mouth/Throat: Oropharynx is clear and moist.   Eyes: EOM are normal. Pupils are equal, round, and reactive to light. Right eye exhibits no discharge. Left eye exhibits no discharge. No scleral icterus.   Neck: Normal range of motion. Neck supple. No JVD present. No tracheal deviation present.   Cardiovascular: Normal rate, regular rhythm, S1 normal, S2 normal, normal heart sounds, intact distal pulses and normal pulses.  Exam reveals no gallop, no S3, no S4 and no friction rub.    No murmur heard.   No systolic murmur is present    No diastolic murmur is present   Pulses:       Carotid pulses are 2+ on the right side, and 2+ on the left side.       Radial pulses are 2+ on the right side, and 2+ on the left side.        Dorsalis pedis pulses are 2+ on the right side, and 2+ on the left side.        Posterior tibial pulses are 2+ on the right side, and 2+ on the left side.   Pulmonary/Chest: Effort normal and breath sounds normal. No respiratory distress. He has no wheezes. He has no rales.   Abdominal: Soft. Bowel sounds are normal. He exhibits no distension and no mass. There is no tenderness. There is no rebound and no guarding.   Musculoskeletal: He exhibits no edema.   Neurological: He is alert and oriented to person, place, and time. No cranial nerve deficit.   Skin: Skin is warm and dry. He is not diaphoretic. No pallor.   Psychiatric: He has a normal mood and affect. His behavior is normal. Judgment and thought content normal.   Nursing note and vitals reviewed.      Assessment:     1. Metastatic melanoma (CMS-HCC)     2. Unstable angina pectoris (CMS-HCC)         Medical Decision Making:  Today's Assessment / Status / Plan:     67-year-old male with stage IV metastatic melanoma and paroxysmal atrial fibrillation. We will have him take the flecainide as needed. He will call us if he has any issues with A. fib but does not resolve.    1. A-fib    - cont flecainide  50 BID PRN    - cont dilt 120 CD     2. 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 Stage IV melanoma    - defer    Thank for you allowing me to take part in your patient's care, please call should you have any questions or would like to discuss this patient.      Physical Exam   Constitutional: He is oriented to person, place, and time. He appears well-developed and well-nourished. No distress.   HENT:   Head: Normocephalic.   Mouth/Throat: Oropharynx is clear and moist.   Eyes: EOM are normal. Pupils are equal, round, and reactive to light. Right eye exhibits no discharge. Left eye exhibits no discharge. No scleral icterus.   Neck: Normal range of motion. Neck supple. No JVD present. No tracheal deviation present.   Cardiovascular: Normal rate, regular rhythm, S1 normal, S2 normal, normal heart sounds, intact distal pulses and normal pulses.  Exam reveals no gallop, no S3, no S4 and no friction rub.    No murmur heard.   No systolic murmur is present    No diastolic murmur is present   Pulses:       Carotid pulses are 2+ on the right side, and 2+ on the left side.       Radial pulses are 2+ on the right side, and 2+ on the left side.        Dorsalis pedis pulses are 2+ on the right side, and 2+ on the left side.        Posterior tibial pulses are 2+ on the right side, and 2+ on the left side.   Pulmonary/Chest: Effort normal and breath sounds normal. No respiratory distress. He has no wheezes. He has no rales.   Abdominal: Soft. Bowel sounds are normal. He exhibits no distension and no mass. There is no tenderness. There is no rebound and no guarding.   Musculoskeletal: He exhibits no edema.   Neurological: He is alert and oriented to person, place, and time. No cranial nerve deficit.   Skin: Skin is warm and dry. He is not diaphoretic. No pallor.   Psychiatric: He has a normal mood and affect. His behavior is normal. Judgment and thought content normal.   Nursing note and vitals  reviewed.

## 2018-05-22 ENCOUNTER — HOSPITAL ENCOUNTER (OUTPATIENT)
Dept: RADIOLOGY | Facility: MEDICAL CENTER | Age: 68
End: 2018-05-22
Attending: INTERNAL MEDICINE
Payer: MEDICARE

## 2018-05-22 DIAGNOSIS — C43.4 MALIGNANT MELANOMA OF SKIN OF SCALP AND NECK (HCC): ICD-10-CM

## 2018-05-22 PROCEDURE — 71260 CT THORAX DX C+: CPT

## 2018-05-22 PROCEDURE — 700117 HCHG RX CONTRAST REV CODE 255: Performed by: INTERNAL MEDICINE

## 2018-05-22 RX ADMIN — IOHEXOL 100 ML: 350 INJECTION, SOLUTION INTRAVENOUS at 11:06

## 2018-05-22 RX ADMIN — IOHEXOL 50 ML: 240 INJECTION, SOLUTION INTRATHECAL; INTRAVASCULAR; INTRAVENOUS; ORAL at 11:07

## 2018-05-23 ENCOUNTER — OFFICE VISIT (OUTPATIENT)
Dept: ENDOCRINOLOGY | Facility: MEDICAL CENTER | Age: 68
End: 2018-05-23
Payer: MEDICARE

## 2018-05-23 VITALS
WEIGHT: 235 LBS | DIASTOLIC BLOOD PRESSURE: 82 MMHG | RESPIRATION RATE: 16 BRPM | OXYGEN SATURATION: 96 % | BODY MASS INDEX: 31.83 KG/M2 | HEIGHT: 72 IN | SYSTOLIC BLOOD PRESSURE: 126 MMHG | HEART RATE: 88 BPM

## 2018-05-23 DIAGNOSIS — C43.9 METASTATIC MELANOMA (HCC): ICD-10-CM

## 2018-05-23 DIAGNOSIS — E29.1 PRIMARY TESTICULAR FAILURE: ICD-10-CM

## 2018-05-23 DIAGNOSIS — E27.40 ADRENAL INSUFFICIENCY (HCC): Primary | ICD-10-CM

## 2018-05-23 DIAGNOSIS — E03.9 PRIMARY HYPOTHYROIDISM: ICD-10-CM

## 2018-05-23 PROCEDURE — 99205 OFFICE O/P NEW HI 60 MIN: CPT | Performed by: INTERNAL MEDICINE

## 2018-05-23 RX ORDER — FELODIPINE 2.5 MG/1
2.5 TABLET, EXTENDED RELEASE ORAL DAILY
COMMUNITY
End: 2019-05-21 | Stop reason: SDUPTHER

## 2018-05-23 RX ORDER — PREDNISONE 1 MG/1
TABLET ORAL
Qty: 120 TAB | Refills: 3
Start: 2018-05-23 | End: 2018-07-31

## 2018-05-23 RX ORDER — PREDNISONE 1 MG/1
TABLET ORAL
Qty: 60 TAB | Refills: 3 | Status: SHIPPED | OUTPATIENT
Start: 2018-05-23 | End: 2018-05-23

## 2018-05-23 RX ORDER — PREDNISONE 5 MG/1
5 TABLET ORAL DAILY
COMMUNITY
End: 2018-05-23

## 2018-05-23 NOTE — PROGRESS NOTES
Chief Complaint   Patient presents with   • Adrenal Insufficiency     secondary to immunotherapy for metastatic melanoma        HPI:          The original cutaneous melanoma was resected in 1990.. He was found to have recurrence in nodes in 2007 which again were resected. In 2012 adenopathy was found in his left neck which was also resected. 2016 he had a recurrence finding disease in the retroperitoneal area. At that time he was given therapy with Yervoy and Nivolimab. Around that time he was started on corticosteroids and has been on variable doses since that time. He also has been diagnosed as having hypothyroidism and that has been treated currently with levothyroxine 150 µg per day           I am told currently he is free of melanoma based on recent scans. He remains on hydrocortisone 20 mg by mouth every morning and 10 mg nightly. He additionally has been taking prednisone 5 mg a day originally and now on a weaning program of 5 mg every third day. He feels very well on the days that he takes 5 mg. The next day without prednisone he  feels  down with fatigue. By the third day off of prednisone he is utterly exhausted until he takes his prednisone again. In his estimation this weaning process is not successful or sustainable.          Recent lab indicates that his pituitary function is intact. For example his IGF-I level is normal at 136 (47-1 92). TSH is 3.3 with a free T4 of 1.5. His free testosterone is low at 3.7 (6.6-18.1 and at the same time his LH is elevated at 15.1 (1.7-8.6 ). 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         As expected when measured his cortisol is 1.1 and ACTH also suppress completely at < 1.1. This is not indicative of pituitary insufficiency. This is indicative of hypothalamic suppression by exogenous corticosteroids.             We should make an attempt to slowly wean him off of prednisone and stay on maintenance  hydrocortisone for the time being. I gave him the usual warnings about adrenal insufficiency requiring increasing doses at times of stress, illness, injury and surgery. He should never run out of hydrocortisone. If he is in a position with nausea and vomiting and cannot hold medication down he must go to an emergency room for intravenous therapy. I also instructed him to wear a medic alert medallion or bracelet to this effect.            I would like to start a weaning program of prednisone on a daily basis. To begin with he will take prednisone 4 mg per day each morning for 2 weeks. If everything seems stable he will reduce the dose to 3 mg per day for 2 more weeks. I will see him at that time. We have recent electrolytes done May 7 which are in good order including a creatinine of 1.1 and BUN 19. Glucose is 98. CBC also normal with the exception of WBC 13,000 possibly related to his steroid therapy    ROS:    Atrial fibrillation has occurred in the interval and apparently controlled with medication. No anticoagulation except aspirin 88 mg per day.  Hyperlipidemia treated with simvastatin.  All other systems reported as negative except as stated above      Allergies:   Allergies   Allergen Reactions   • Sulfa Drugs      Severe muscle pains       Current medicines including changes today:  Current Outpatient Prescriptions   Medication Sig Dispense Refill   • felodipine (PLENDIL) 2.5 MG TABLET SR 24 HR Take 2.5 mg by mouth every day.     • predniSONE (DELTASONE) 1 MG Tab 4 tablets every morning 120 Tab 3   • hydrocortisone (CORTEF) 10 MG Tab Take 10 mg by mouth 3 times a day.     • levothyroxine (SYNTHROID) 150 MCG Tab      • flecainide (TAMBOCOR) 100 MG Tab Take 100 mg by mouth 2 times a day.     • HYDROcodone/acetaminophen (NORCO)  MG Tab      • aspirin 81 MG EC tablet Take 1 Tab by mouth every day. 100 Tab 3   • tamsulosin (FLOMAX) 0.4 MG capsule Take 0.4 mg by mouth ONE-HALF HOUR AFTER BREAKFAST.     •  diltiazem CD (CARDIZEM CD) 120 MG CAPSULE SR 24 HR Take 1 Cap by mouth every day. 30 Cap 11   • simvastatin (ZOCOR) 20 MG TABS Take 20 mg by mouth every morning.     • omeprazole (PRILOSEC) 20 MG CPDR Take 20 mg by mouth every day.     • Multiple Vitamin (MULTIVITAMIN PO) Take  by mouth every day.     • flecainide (TAMBOCOR) 50 MG tablet Take 1 Tab by mouth 2 times a day. 60 Tab 11   • predniSONE (DELTASONE) 10 MG Tab Take 5 mg by mouth every day.     • loperamide (IMODIUM) 2 MG Cap Take 1 Cap by mouth 4 times a day as needed for Diarrhea. 30 Cap 3     No current facility-administered medications for this visit.         Past Medical History:   Diagnosis Date   • Adrenal disorder (HCC)    • Arrhythmia    • Cancer (HCC)     melanoma in 2007, original 1989 from back of neck   • Heart burn    • Hypertension    • Indigestion    • Thyroid disease      Family history        No family history of melanoma. Father had a glioblastoma.    Social history          Patient is . Is retired  with the [employer].          He does smoke cigarettes and is 2 packs away from quitting    PHYSICAL EXAM:    /82   Pulse 88   Resp 16   Ht 1.829 m (6')   Wt 106.6 kg (235 lb)   SpO2 96%   BMI 31.87 kg/m²      Gen.  Overweight but otherwise appears healthy. He does not have definite cushingoid changes    Skin   rashes and scratches on his forearms which he considers usual for him and not related to steroids    HEENT  unremarkable    Neck   no adenopathy, thyroid gland is small and difficult to palpate or perhaps substernal    Heart  regular    Chest   no nodules or abnormalities palpable over the thorax or in the axillary region.    Abdomen   no masses or organ enlargement, no cushingoid striae    Genitalia   testicles about normal size and he believes have not decreased in size     Extremities  no edema    Neuro  gait and station normal    Psych  appropriate, good spirits    ASSESSMENT AND  RECOMMENDATIONS    1. Adrenal insufficiency (HCC)               Presumably primary adrenal insufficiency related to immunotherapy                Currently trying to wean steroid therapy down to maintenance  - predniSONE (DELTASONE) 1 MG Tab; 4 tablets every morning  Dispense: 120 Tab; Refill: 3    2. Primary hypothyroidism               This is not related to pituitary insufficiency    3. Primary testicular failure           Not related to pituitary insufficiency            Probably related to chemo / Immuno therapy    4. Metastatic melanoma (HCC)           Possibly in remission currently      DISPOSITION:   See discussion above                             Return in one month. At that time he may be down to 3 mg prednisone daily. We may have to wean more slowly at that point                             Instructions given about hydrocortisone usage and stress dosing when necessary and to never run out                              Information given to secure a medic alert type bracelet or medallion       Paco Manrique M.D.    Copies to: Yon Madison M.D. [phone]                 Dr. Shane Agee

## 2018-06-25 RX ORDER — HYDROCORTISONE 10 MG/1
10 TABLET ORAL 3 TIMES DAILY
Qty: 30 TAB | Refills: 0 | Status: SHIPPED | OUTPATIENT
Start: 2018-06-25 | End: 2019-05-21

## 2018-06-25 RX ORDER — HYDROCORTISONE 10 MG/1
TABLET ORAL
Qty: 90 TAB | Refills: 2 | Status: SHIPPED | OUTPATIENT
Start: 2018-06-25 | End: 2018-09-28 | Stop reason: SDUPTHER

## 2018-06-25 NOTE — TELEPHONE ENCOUNTER
Was the patient seen in the last year in this department? Yes     Does patient have an active prescription for medications requested? No     Received Request Via: Pharmacy     hydrocortisone (CORTEF) 10 MG Tab

## 2018-06-26 ENCOUNTER — OFFICE VISIT (OUTPATIENT)
Dept: ENDOCRINOLOGY | Facility: MEDICAL CENTER | Age: 68
End: 2018-06-26
Payer: MEDICARE

## 2018-06-26 VITALS
DIASTOLIC BLOOD PRESSURE: 90 MMHG | OXYGEN SATURATION: 90 % | BODY MASS INDEX: 31.97 KG/M2 | HEART RATE: 92 BPM | HEIGHT: 72 IN | SYSTOLIC BLOOD PRESSURE: 138 MMHG | WEIGHT: 236 LBS

## 2018-06-26 DIAGNOSIS — E03.9 HYPOTHYROIDISM, UNSPECIFIED TYPE: ICD-10-CM

## 2018-06-26 DIAGNOSIS — C43.9 METASTATIC MELANOMA (HCC): ICD-10-CM

## 2018-06-26 DIAGNOSIS — E29.1 PRIMARY TESTICULAR HYPOGONADISM: Primary | ICD-10-CM

## 2018-06-26 DIAGNOSIS — E27.40 ADRENAL INSUFFICIENCY (HCC): ICD-10-CM

## 2018-06-26 PROCEDURE — 99214 OFFICE O/P EST MOD 30 MIN: CPT | Performed by: INTERNAL MEDICINE

## 2018-06-27 NOTE — PROGRESS NOTES
Chief Complaint   Patient presents with   • Adrenal Insufficiency     secondary to steroid suppression        HPI:    See assessment and recommendations below    ROS:   All other systems reported as negative or unchanged since last exam      Allergies:   Allergies   Allergen Reactions   • Sulfa Drugs      Severe muscle pains       Current medicines including changes today:  Current Outpatient Prescriptions   Medication Sig Dispense Refill   • hydrocortisone (CORTEF) 10 MG Tab take 2 tablets by mouth every morning and 1 tablet every evening 90 Tab 2   • felodipine (PLENDIL) 2.5 MG TABLET SR 24 HR Take 2.5 mg by mouth every day.     • predniSONE (DELTASONE) 1 MG Tab 4 tablets every morning 120 Tab 3   • levothyroxine (SYNTHROID) 150 MCG Tab      • flecainide (TAMBOCOR) 100 MG Tab Take 100 mg by mouth 2 times a day.     • HYDROcodone/acetaminophen (NORCO)  MG Tab      • aspirin 81 MG EC tablet Take 1 Tab by mouth every day. 100 Tab 3   • flecainide (TAMBOCOR) 50 MG tablet Take 1 Tab by mouth 2 times a day. 60 Tab 11   • predniSONE (DELTASONE) 10 MG Tab Take 5 mg by mouth every day.     • tamsulosin (FLOMAX) 0.4 MG capsule Take 0.4 mg by mouth ONE-HALF HOUR AFTER BREAKFAST.     • diltiazem CD (CARDIZEM CD) 120 MG CAPSULE SR 24 HR Take 1 Cap by mouth every day. 30 Cap 11   • simvastatin (ZOCOR) 20 MG TABS Take 20 mg by mouth every morning.     • omeprazole (PRILOSEC) 20 MG CPDR Take 20 mg by mouth every day.     • Multiple Vitamin (MULTIVITAMIN PO) Take  by mouth every day.     • hydrocortisone (CORTEF) 10 MG Tab Take 1 Tab by mouth 3 times a day. 30 Tab 0   • loperamide (IMODIUM) 2 MG Cap Take 1 Cap by mouth 4 times a day as needed for Diarrhea. 30 Cap 3     No current facility-administered medications for this visit.         Past Medical History:   Diagnosis Date   • Adrenal disorder (HCC)    • Arrhythmia    • Cancer (HCC)     melanoma in 2007, original 1989 from back of neck   • Heart burn    • Hypertension    •  Indigestion    • Thyroid disease        PHYSICAL EXAM:    /90   Pulse 92   Ht 1.829 m (6')   Wt 107 kg (236 lb)   SpO2 90%   BMI 32.01 kg/m²      Gen.   appears healthy     Skin   appropriate for sex and age    HEENT  unremarkable    Neck   no adenopathy    Heart  regular    Extremities  no edema    Neuro  gait and station normal    Psych  appropriate    ASSESSMENT AND RECOMMENDATIONS    1. Metastatic melanoma (HCC)             Currently in remission after immune therapy    2. Hypothyroidism, unspecified type              Adequately replaced with levothyroxine 150 µg per day              Recent TSH done May 8 is 3.3. No dose change indicated  - FREE THYROXINE; Future  - TSH; Future    3. Adrenal insufficiency (HCC)           This presumably is secondary adrenal insufficiency related to high-dose steroid suppression.            He is currently weaning off prednisone very slowly going down by 1 mg every week or 2. He will be off soon            He will continue on maintenance hydrocortisone  15 mg a.m.                                                                                           10 mg midday                                                                                             5 mg p.m.                 We also discussed stress dosing with hydrocortisone. He has a medical alert bracelet coming             After he is off of prednisone I will begin to wean him off of hydrocortisone  - BASIC METABOLIC PANEL; Future    4. Primary testicular hypogonadism             This is likely also a result of his immune therapy affecting testicular function as opposed to pituitary              We will continue to monitor without supplement as time goes by. It may recover  - TESTOSTERONE,FREE ADULT MALE; Future  - LUTEINIZING HORMONE SERUM; Future      DISPOSITION:  Return in about one month       Paco Manrique M.D.    Copies to: Yon Madison M.D. [phone]                 Dr. Shane Agee

## 2018-07-25 LAB
AMBIG ABBREV BPM8  977205: NORMAL
BUN SERPL-MCNC: 12 MG/DL (ref 8–27)
BUN/CREAT SERPL: 12 (ref 10–24)
CALCIUM SERPL-MCNC: 9.5 MG/DL (ref 8.6–10.2)
CHLORIDE SERPL-SCNC: 105 MMOL/L (ref 96–106)
CO2 SERPL-SCNC: 24 MMOL/L (ref 20–29)
CREAT SERPL-MCNC: 1.01 MG/DL (ref 0.76–1.27)
GLUCOSE SERPL-MCNC: 122 MG/DL (ref 65–99)
IF AFRICAN AMERICAN  100797: 89 ML/MIN/1.73
IF NON AFRICAN AMER 100791: 77 ML/MIN/1.73
LH SERPL-ACNC: 15.8 MIU/ML (ref 1.7–8.6)
POTASSIUM SERPL-SCNC: 4.5 MMOL/L (ref 3.5–5.2)
SODIUM SERPL-SCNC: 143 MMOL/L (ref 134–144)
T4 FREE SERPL-MCNC: 1.53 NG/DL (ref 0.82–1.77)
TESTOST FREE SERPL-MCNC: 3 PG/ML (ref 6.6–18.1)
TSH SERPL DL<=0.005 MIU/L-ACNC: 1.66 UIU/ML (ref 0.45–4.5)

## 2018-07-31 ENCOUNTER — TELEPHONE (OUTPATIENT)
Dept: ENDOCRINOLOGY | Facility: MEDICAL CENTER | Age: 68
End: 2018-07-31

## 2018-07-31 ENCOUNTER — OFFICE VISIT (OUTPATIENT)
Dept: ENDOCRINOLOGY | Facility: MEDICAL CENTER | Age: 68
End: 2018-07-31
Payer: MEDICARE

## 2018-07-31 VITALS
BODY MASS INDEX: 31.97 KG/M2 | OXYGEN SATURATION: 92 % | HEIGHT: 72 IN | SYSTOLIC BLOOD PRESSURE: 114 MMHG | HEART RATE: 97 BPM | WEIGHT: 236 LBS | DIASTOLIC BLOOD PRESSURE: 68 MMHG | RESPIRATION RATE: 16 BRPM

## 2018-07-31 DIAGNOSIS — C43.9 METASTATIC MELANOMA (HCC): ICD-10-CM

## 2018-07-31 DIAGNOSIS — E29.1 PRIMARY TESTICULAR HYPOGONADISM: ICD-10-CM

## 2018-07-31 DIAGNOSIS — E03.9 HYPOTHYROIDISM, UNSPECIFIED TYPE: Primary | ICD-10-CM

## 2018-07-31 DIAGNOSIS — E27.40 ADRENAL INSUFFICIENCY (HCC): ICD-10-CM

## 2018-07-31 PROCEDURE — 99214 OFFICE O/P EST MOD 30 MIN: CPT | Mod: 25 | Performed by: INTERNAL MEDICINE

## 2018-07-31 PROCEDURE — 96372 THER/PROPH/DIAG INJ SC/IM: CPT | Performed by: INTERNAL MEDICINE

## 2018-07-31 RX ORDER — LEVOTHYROXINE SODIUM 0.15 MG/1
150 TABLET ORAL
Qty: 90 TAB | Refills: 3 | Status: SHIPPED | OUTPATIENT
Start: 2018-07-31 | End: 2019-07-15 | Stop reason: SDUPTHER

## 2018-07-31 RX ORDER — TESTOSTERONE CYPIONATE 200 MG/ML
100 INJECTION, SOLUTION INTRAMUSCULAR ONCE
Status: COMPLETED | OUTPATIENT
Start: 2018-07-31 | End: 2018-07-31

## 2018-07-31 RX ADMIN — TESTOSTERONE CYPIONATE 100 MG: 200 INJECTION, SOLUTION INTRAMUSCULAR at 14:26

## 2018-08-01 NOTE — PROGRESS NOTES
Chief Complaint   Patient presents with   • Hypogonadism     marked fatigue and weakness   • Hypothyroidism   • Adrenal Insufficiency        Endocrine review:   This gentleman has battled recurrent metastatic melanoma since the original lesion was taken off the back of his neck in 1990.  He had a recurrent lesion in 2007 and a third lesion resected in 2012.  He was then noted to have recurrence abdominally in 2016.  At that point, he was treated with immunotherapy, i.e., Vervoy and Nivolimab.  I don’t think there has been a recurrence since he has had that.  However he probably has had as a result adrenal insufficiency and testicular insufficiency.  He has been on thyroid replacement and I don’t know that that was related to his immunotherapy.      Currently he has marked fatigue and exhaustion.  His current chemistries are in very good order.  It does not suggest adrenal insufficiency.  He has weaned off of prednisone completely and is currently on maintenance hydrocortisone 10 mg t.i.d.  I would like to reduce that but he feels utterly fatigued and exhausted.  His thyroid levels are in good range with free T4 upper normal of 1.5 and TSH 1.6.  However his testosterone is very low.  Free testosterone is 3.0 (6.6-18.1).  At the same time, his LH is elevated at 15.8 (1.7-8.6).  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     Low testosterone is the obvious thing to treat to help his fatigue. I have checked with his urologist, Dr. Belcher who has recently seen him and done a prostate examination which was unremarkable and also a PSA which is 1.9.  She has given us the green light to go ahead with testosterone.  I went over possible side effects of testosterone therapy including prostatic hypertrophy and lower urinary symptoms.  Also may cause elevated estradiol and gynecomastia.  It can also result in polycythemia, especially if he has any difficulty with sleep apnea.  In any event,  he wishes to proceed and I will give him a trial dose of testosterone 100 mg IM and I would like to see him in two weeks and we will get an idea if he seems to have had a response.  If he has, then we will work out some treatment plan and follow up thereafter.  He is in agreement.     ROS:   Off of prednisone completely. Continues hydrocortisone 30 mg a day in divided doses   Primary complaint is fatigue and exhaustion      Allergies:   Allergies   Allergen Reactions   • Sulfa Drugs      Severe muscle pains       Current medicines including changes today:  Current Outpatient Prescriptions   Medication Sig Dispense Refill   • levothyroxine (SYNTHROID) 150 MCG Tab Take 1 Tab by mouth Every morning on an empty stomach. 90 Tab 3   • hydrocortisone (CORTEF) 10 MG Tab take 2 tablets by mouth every morning and 1 tablet every evening 90 Tab 2   • felodipine (PLENDIL) 2.5 MG TABLET SR 24 HR Take 2.5 mg by mouth every day.     • levothyroxine (SYNTHROID) 150 MCG Tab      • flecainide (TAMBOCOR) 100 MG Tab Take 100 mg by mouth 2 times a day.     • HYDROcodone/acetaminophen (NORCO)  MG Tab      • aspirin 81 MG EC tablet Take 1 Tab by mouth every day. 100 Tab 3   • tamsulosin (FLOMAX) 0.4 MG capsule Take 0.4 mg by mouth ONE-HALF HOUR AFTER BREAKFAST.     • diltiazem CD (CARDIZEM CD) 120 MG CAPSULE SR 24 HR Take 1 Cap by mouth every day. 30 Cap 11   • simvastatin (ZOCOR) 20 MG TABS Take 20 mg by mouth every morning.     • omeprazole (PRILOSEC) 20 MG CPDR Take 20 mg by mouth every day.     • Multiple Vitamin (MULTIVITAMIN PO) Take  by mouth every day.     • hydrocortisone (CORTEF) 10 MG Tab Take 1 Tab by mouth 3 times a day. 30 Tab 0   • flecainide (TAMBOCOR) 50 MG tablet Take 1 Tab by mouth 2 times a day. 60 Tab 11   • predniSONE (DELTASONE) 10 MG Tab Take 5 mg by mouth every day.     • loperamide (IMODIUM) 2 MG Cap Take 1 Cap by mouth 4 times a day as needed for Diarrhea. 30 Cap 3     No current facility-administered  medications for this visit.         Past Medical History:   Diagnosis Date   • Adrenal disorder (HCC)    • Arrhythmia    • Cancer (HCC)     melanoma in 2007, original 1989 from back of neck   • Heart burn    • Hypertension    • Indigestion    • Thyroid disease        PHYSICAL EXAM:    /68   Pulse 97   Resp 16   Ht 1.829 m (6')   Wt 107 kg (236 lb)   SpO2 92%   BMI 32.01 kg/m²       Gen.   appears healthy     Skin   appropriate for sex and age    HEENT  unremarkable    Neck   No palpable thyroid. I think it is atrophic    Breasts   no gynecomastia    Heart  regular    Extremities  no edema    Neuro  gait and station normal    Psych  appropriate      ASSESSMENT AND RECOMMENDATIONS    1. Primary testicular hypogonadism             Possibly caused by immunotherapy            Primary complaint is fatigue and exhaustion. Free testosterone very low at 3.0 with elevated LH 15.8            A trial of testosterone is indicated. His urologist Dr. Belcher has given us the green light  - testosterone cypionate (DEPO-TESTOSTERONE) injection 100 mg; 0.5 mL by Intramuscular route Once.            Return in 2 weeks and discuss response to testosterone and consider long-term therapy options    2. Adrenal insufficiency (HCC)             This was probably caused by  immunotherapy 2016            He has weaned completely off prednisone. Currently on maintenance hydrocortisone 10 mg 3 times a day.             I will reduce his hydrocortisone dosing if we get a favorable response to testosterone    3. Hypothyroidism, unspecified type               Adequately replaced with levothyroxine 150 µg per day.               Current free thyroxine is 1.5 (0.8-1.7) and TSH is 1.6  - levothyroxine (SYNTHROID) 150 MCG Tab; Take 1 Tab by mouth Every morning on an empty stomach.  Dispense: 90 Tab; Refill: 3    4. Metastatic melanoma (HCC)           Original resection 1990 on the back of his neck with multiple recurrences and surgeries since  that time.           Most recent recurrence was 2016 multiple abdominal lesions. This was treated with Yervoy and Nivolimab. I don't think he has had a definite recurrence since this therapy        DISPOSITION: Return in 2 weeks to assess the results of testosterone trial       Paco Manrique M.D.    Copies to: Yon Madison M.D. [phone]                   Dr. Shane Belcher, [facility] [place]

## 2018-08-14 ENCOUNTER — OFFICE VISIT (OUTPATIENT)
Dept: ENDOCRINOLOGY | Facility: MEDICAL CENTER | Age: 68
End: 2018-08-14
Payer: MEDICARE

## 2018-08-14 VITALS
WEIGHT: 238 LBS | DIASTOLIC BLOOD PRESSURE: 70 MMHG | SYSTOLIC BLOOD PRESSURE: 120 MMHG | RESPIRATION RATE: 16 BRPM | HEART RATE: 94 BPM | HEIGHT: 72 IN | OXYGEN SATURATION: 92 % | BODY MASS INDEX: 32.23 KG/M2

## 2018-08-14 DIAGNOSIS — E29.1 PRIMARY TESTICULAR HYPOGONADISM: Primary | ICD-10-CM

## 2018-08-14 PROCEDURE — 96372 THER/PROPH/DIAG INJ SC/IM: CPT | Performed by: INTERNAL MEDICINE

## 2018-08-14 PROCEDURE — 99213 OFFICE O/P EST LOW 20 MIN: CPT | Mod: 25 | Performed by: INTERNAL MEDICINE

## 2018-08-14 RX ORDER — TESTOSTERONE CYPIONATE 200 MG/ML
200 INJECTION, SOLUTION INTRAMUSCULAR ONCE
Status: COMPLETED | OUTPATIENT
Start: 2018-08-14 | End: 2018-08-14

## 2018-08-14 RX ORDER — TESTOSTERONE CYPIONATE 200 MG/ML
INJECTION, SOLUTION INTRAMUSCULAR
Qty: 2 ML | Refills: 5 | Status: SHIPPED
Start: 2018-08-14 | End: 2018-11-28

## 2018-08-14 RX ADMIN — TESTOSTERONE CYPIONATE 200 MG: 200 INJECTION, SOLUTION INTRAMUSCULAR at 11:32

## 2018-08-14 NOTE — PROGRESS NOTES
Chief Complaint   Patient presents with   • Hypogonadism     testicular failure due to immunotherapy        HPI:    Primary testicular failure          I am assuming this is due to his immune therapy. Last month his free testosterone was 3.0 (6.6-18.1) and his LH was elevated at 15.8 (1.7-8.6).          He lacks energy, motivation and self-confidence. Last month he got testosterone 100 mg IM as a trial. He did improve somewhat and his wife definitely saw a difference. He reports that he is thinking more clearly and able to make better decisions. He will  continue testosterone at a higher dose.           I will give him 200 mg IM today and he will start biweekly injections of 150 mg administered by his wife. I will see him in 2 months to review results. I have previously reviewed possible side effects of testosterone.    ROS:  All other systems reported as negative or unchanged since last exam          Allergies:   Allergies   Allergen Reactions   • Sulfa Drugs      Severe muscle pains       Current medicines including changes today:  Current Outpatient Prescriptions   Medication Sig Dispense Refill   • levothyroxine (SYNTHROID) 150 MCG Tab Take 1 Tab by mouth Every morning on an empty stomach. 90 Tab 3   • hydrocortisone (CORTEF) 10 MG Tab take 2 tablets by mouth every morning and 1 tablet every evening 90 Tab 2   • hydrocortisone (CORTEF) 10 MG Tab Take 1 Tab by mouth 3 times a day. 30 Tab 0   • felodipine (PLENDIL) 2.5 MG TABLET SR 24 HR Take 2.5 mg by mouth every day.     • levothyroxine (SYNTHROID) 150 MCG Tab      • flecainide (TAMBOCOR) 100 MG Tab Take 100 mg by mouth 2 times a day.     • HYDROcodone/acetaminophen (NORCO)  MG Tab      • aspirin 81 MG EC tablet Take 1 Tab by mouth every day. 100 Tab 3   • flecainide (TAMBOCOR) 50 MG tablet Take 1 Tab by mouth 2 times a day. 60 Tab 11   • predniSONE (DELTASONE) 10 MG Tab Take 5 mg by mouth every day.     • tamsulosin (FLOMAX) 0.4 MG capsule Take 0.4 mg by  mouth ONE-HALF HOUR AFTER BREAKFAST.     • diltiazem CD (CARDIZEM CD) 120 MG CAPSULE SR 24 HR Take 1 Cap by mouth every day. 30 Cap 11   • loperamide (IMODIUM) 2 MG Cap Take 1 Cap by mouth 4 times a day as needed for Diarrhea. 30 Cap 3   • simvastatin (ZOCOR) 20 MG TABS Take 20 mg by mouth every morning.     • omeprazole (PRILOSEC) 20 MG CPDR Take 20 mg by mouth every day.     • Multiple Vitamin (MULTIVITAMIN PO) Take  by mouth every day.       No current facility-administered medications for this visit.         Past Medical History:   Diagnosis Date   • Adrenal disorder (HCC)    • Arrhythmia    • Cancer (HCC)     melanoma in 2007, original 1989 from back of neck   • Heart burn    • Hypertension    • Indigestion    • Metastatic melanoma (HCC) 1990 / 2012    original neck lesion resected 1990 / recurrent lesion resected 2007 / third lesion resected 2012 /recurrence in 2016   • Thyroid disease        PHYSICAL EXAM:    /70   Pulse 94   Resp 16   Ht 1.829 m (6')   Wt 108 kg (238 lb)   SpO2 92%   BMI 32.28 kg/m²      Gen.   appears healthy     Skin   appropriate for sex and age    HEENT  unremarkable    Neck   no adenopathy    Breasts   normal male with some fatty tissue. No tenderness    Heart  regular    Extremities  no edema    Neuro  gait and station normal    Psych  appropriate    ASSESSMENT AND RECOMMENDATIONS    1. Primary testicular hypogonadism            Continue testosterone injections at home 150 mg IM every 2 weeks    - TESTOSTERONE,FREE ADULT MALE; Future  - ESTRADIOL; Future  - CBC WITHOUT DIFFERENTIAL; Future      DISPOSITION:   Return in 2 months       Paco Manrique M.D.    Copies to: Yon Madison M.D. [phone]

## 2018-08-28 DIAGNOSIS — E29.1 PRIMARY TESTICULAR HYPOGONADISM: ICD-10-CM

## 2018-09-19 ENCOUNTER — HOSPITAL ENCOUNTER (OUTPATIENT)
Dept: RADIOLOGY | Facility: MEDICAL CENTER | Age: 68
End: 2018-09-19
Attending: INTERNAL MEDICINE
Payer: MEDICARE

## 2018-09-19 DIAGNOSIS — C43.4 MALIGNANT MELANOMA OF SKIN OF SCALP AND NECK (HCC): ICD-10-CM

## 2018-09-19 PROCEDURE — 71260 CT THORAX DX C+: CPT

## 2018-09-19 PROCEDURE — 700117 HCHG RX CONTRAST REV CODE 255: Performed by: INTERNAL MEDICINE

## 2018-09-19 RX ADMIN — IOHEXOL 75 ML: 350 INJECTION, SOLUTION INTRAVENOUS at 10:01

## 2018-09-24 ENCOUNTER — TELEPHONE (OUTPATIENT)
Dept: PULMONOLOGY | Facility: HOSPICE | Age: 68
End: 2018-09-24

## 2018-09-24 ENCOUNTER — TELEPHONE (OUTPATIENT)
Dept: RADIOLOGY | Facility: IMAGING CENTER | Age: 68
End: 2018-09-24

## 2018-09-24 DIAGNOSIS — R06.02 SOB (SHORTNESS OF BREATH): ICD-10-CM

## 2018-09-26 ENCOUNTER — OFFICE VISIT (OUTPATIENT)
Dept: PULMONOLOGY | Facility: HOSPICE | Age: 68
End: 2018-09-26
Payer: MEDICARE

## 2018-09-26 ENCOUNTER — APPOINTMENT (OUTPATIENT)
Dept: RADIOLOGY | Facility: IMAGING CENTER | Age: 68
End: 2018-09-26
Payer: MEDICARE

## 2018-09-26 ENCOUNTER — NON-PROVIDER VISIT (OUTPATIENT)
Dept: PULMONOLOGY | Facility: HOSPICE | Age: 68
End: 2018-09-26
Payer: MEDICARE

## 2018-09-26 VITALS
BODY MASS INDEX: 34.5 KG/M2 | WEIGHT: 241 LBS | HEART RATE: 80 BPM | DIASTOLIC BLOOD PRESSURE: 84 MMHG | SYSTOLIC BLOOD PRESSURE: 126 MMHG | OXYGEN SATURATION: 93 % | HEIGHT: 70 IN | RESPIRATION RATE: 16 BRPM | TEMPERATURE: 98.6 F

## 2018-09-26 VITALS — WEIGHT: 241 LBS | BODY MASS INDEX: 34.5 KG/M2 | HEIGHT: 70 IN

## 2018-09-26 DIAGNOSIS — Z72.0 TOBACCO USE: ICD-10-CM

## 2018-09-26 DIAGNOSIS — Z23 NEED FOR VACCINATION: ICD-10-CM

## 2018-09-26 DIAGNOSIS — J44.9 CHRONIC OBSTRUCTIVE PULMONARY DISEASE, UNSPECIFIED COPD TYPE (HCC): ICD-10-CM

## 2018-09-26 DIAGNOSIS — R06.02 SOB (SHORTNESS OF BREATH): ICD-10-CM

## 2018-09-26 DIAGNOSIS — E27.40 ADRENAL INSUFFICIENCY (HCC): ICD-10-CM

## 2018-09-26 DIAGNOSIS — Z85.820 HISTORY OF MELANOMA: ICD-10-CM

## 2018-09-26 PROCEDURE — 94729 DIFFUSING CAPACITY: CPT | Performed by: INTERNAL MEDICINE

## 2018-09-26 PROCEDURE — 94726 PLETHYSMOGRAPHY LUNG VOLUMES: CPT | Performed by: INTERNAL MEDICINE

## 2018-09-26 PROCEDURE — 90662 IIV NO PRSV INCREASED AG IM: CPT | Performed by: INTERNAL MEDICINE

## 2018-09-26 PROCEDURE — 94060 EVALUATION OF WHEEZING: CPT | Performed by: INTERNAL MEDICINE

## 2018-09-26 PROCEDURE — G0008 ADMIN INFLUENZA VIRUS VAC: HCPCS | Performed by: INTERNAL MEDICINE

## 2018-09-26 PROCEDURE — 99204 OFFICE O/P NEW MOD 45 MIN: CPT | Mod: 25 | Performed by: INTERNAL MEDICINE

## 2018-09-26 ASSESSMENT — PULMONARY FUNCTION TESTS
FVC: 4.64
FEV1_PERCENT_CHANGE: 18
FEV1/FVC_PERCENT_PREDICTED: 70
FEV1: 2.87
FEV1/FVC: 53.25
FVC_PREDICTED: 4.52
FEV1: 2.42
FEV1/FVC_PERCENT_PREDICTED: 71
FEV1_PERCENT_CHANGE: 16
FEV1/FVC_PERCENT_LLN: 62
FEV1/FVC_PERCENT_PREDICTED: 71
FVC_PERCENT_PREDICTED: 119
FEV1_PERCENT_PREDICTED: 85
FEV1/FVC: 52
FEV1/FVC_PREDICTED: 74
FEV1/FVC_PERCENT_PREDICTED: 74
FEV1/FVC_PERCENT_CHANGE: 2
FEV1_LLN: 2.80
FEV1/FVC: 52
FEV1_PREDICTED: 3.35
FEV1/FVC: 53
FVC: 5.39
FEV1_PERCENT_PREDICTED: 72
FVC_LLN: 3.77
FEV1/FVC_PERCENT_CHANGE: 113
FEV1/FVC_PERCENT_PREDICTED: 71
FVC_PERCENT_PREDICTED: 102

## 2018-09-26 NOTE — PROCEDURES
Technician: KALYANI Carrero    Technician Comment:  COMPLETE PFT PERFORMED. Good patient effort & cooperation.  The results of this test meet the ATS/ERS standards for acceptability & reproducibility.  Test was performed on the [employer] NetTV Body Plethysmograph-Elite DX system.  Predicted values were [facility]-3 for spirometry, [facility] for DLCO, ITS for Lung Volumes.  The DLCO was uncorrected for Hgb.  A bronchodilator of Ventolin HFA -2puffs via spacer administered.  DLCO performed during dilation period.    1. Baseline spirometry demonstrates a mild reduction in FEV1. FEV1/FVC ratio is reduced.    2. After administration of an inhaled bronchodilator there is 18% improvement in FEV1.    3. Lung volumes demonstrate a normal total lung capacity at 111% of predicted.    4. Gas exchange as estimated by DLCO is in the low normal range at 80% of predicted.    5. Airway resistance is increased.          Impression:    This study demonstrates the presence of mild obstructive lung disease.  Definite reversibility is noted on the study.  Low normal DLCO may indicate presence of emphysema.

## 2018-09-26 NOTE — LETTER
Balaji Musa M.D.  [facility]   [address] [place], [place] [address], [place] [address]  Phone: [phone] - Fax: [phone]           Encounter Date: 9/26/2018  Provider: Balaji Musa M.D.  Location of Care: [facility] PULMONARY MEDICINE      Patient:   Artem Joseph   MR Number: [id number]   YOB: 1950     PROGRESS NOTE:  Chief Complaint   Patient presents with   • Results     PFT and CT scan   • Shortness of Breath       HPI:  The patient is a 68-year-old man who has a diagnosis of metastatic melanoma.  He also has a history consistent with COPD.  He has smoked a pack of cigarettes a day for 48 years.  He is still smoking about a half a pack of cigarettes per day.  The patient has been on Spiriva for about a year and uses albuterol as a rescue inhaler.  He does have a chronic cough productive of clear sputum.  He has no history of hemoptysis.  He denies significant wheezing.  He was very short of death during the summer because of poor air quality.  He previously worked as a  in [place].  His diagnosis and subsequent extensive treatment of his malignant melanoma as outlined in Dr. Agee's notes.  He is also been found to have adrenal insufficiency.  He is being followed by Dr. Wilson.  He previously had been on prednisone and tells me that when on prednisone he had no breathing problems.  He is now on hydrocortisone.  Pulmonary function testing today demonstrates an FEV1 of 2.42 L which is 72% of predicted.  His FEV1 FVC ratio is reduced at 52%.  He has 18% improvement after an inhaled bronchodilator.  His DLCO is 80% of predicted.  He is in no distress currently.  His CT scan demonstrates a 5 mm nodule in the lingula with some atelectasis at the bases.  He has no significant infiltrates.  He does have evidence of emphysema.  There is no evidence of any significant interstitial lung disease.    Past Medical History:      Diagnosis Date   • Adrenal disorder (HCC)    • Arrhythmia    • Cancer (HCC)     melanoma in 2007, original 1989 from back of neck   • Heart burn    • Hypertension    • Indigestion    • Metastatic melanoma (HCC) 1990 / 2012    original neck lesion resected 1990 / recurrent lesion resected 2007 / third lesion resected 2012 /recurrence in 2016   • Thyroid disease        ROS:   Constitutional: Denies fevers, chills, night sweats, fatigue or weight loss  Eyes: Denies vision loss, pain, drainage, double vision  Ears, Nose, Throat: Denies earache, tinnitus, hoarseness  Cardiovascular: Denies chest pain, tightness, palpitations  Respiratory: See HPI  Sleep: Denies, snoring, apnea  GI: Denies abdominal pain, nausea, vomiting, diarrhea  : Denies frequent urination, hematuria, painful urination  Musculoskeletal: Denies back pain, painful joints, sore muscles  Neurological: Denies headaches, seizures  Skin: Denies rashes, color changes  Psychiatric: Denies depression or thoughts of suicide  Hematologic: Denies bleeding tendency or clotting tendency  Allergic/Immunologic: Denies rhinitis, skin sensitivity    Social History     Social History   • Marital status:      Spouse name: N/A   • Number of children: N/A   • Years of education: N/A     Occupational History   • Not on file.     Social History Main Topics   • Smoking status: Current Every Day Smoker     Packs/day: 0.50     Years: 40.00     Types: Cigarettes   • Smokeless tobacco: Never Used   • Alcohol use Yes      Comment: very little   • Drug use: No   • Sexual activity: Not on file     Other Topics Concern   • Not on file     Social History Narrative   • No narrative on file     Sulfa drugs  Current Outpatient Prescriptions on File Prior to Visit   Medication Sig Dispense Refill   • testosterone cypionate (DEPO-TESTOSTERONE) 200 MG/ML Solution injection Inject intramuscularly testosterone 150 mg ( 0.75 mL) every 2 weeks. This is a single dose vial. Discard the  "remaining contents after one injection. 2 mL 5   • levothyroxine (SYNTHROID) 150 MCG Tab Take 1 Tab by mouth Every morning on an empty stomach. 90 Tab 3   • hydrocortisone (CORTEF) 10 MG Tab take 2 tablets by mouth every morning and 1 tablet every evening 90 Tab 2   • hydrocortisone (CORTEF) 10 MG Tab Take 1 Tab by mouth 3 times a day. 30 Tab 0   • felodipine (PLENDIL) 2.5 MG TABLET SR 24 HR Take 2.5 mg by mouth every day.     • levothyroxine (SYNTHROID) 150 MCG Tab      • flecainide (TAMBOCOR) 100 MG Tab Take 100 mg by mouth 2 times a day.     • HYDROcodone/acetaminophen (NORCO)  MG Tab      • aspirin 81 MG EC tablet Take 1 Tab by mouth every day. 100 Tab 3   • flecainide (TAMBOCOR) 50 MG tablet Take 1 Tab by mouth 2 times a day. 60 Tab 11   • predniSONE (DELTASONE) 10 MG Tab Take 5 mg by mouth every day.     • tamsulosin (FLOMAX) 0.4 MG capsule Take 0.4 mg by mouth ONE-HALF HOUR AFTER BREAKFAST.     • diltiazem CD (CARDIZEM CD) 120 MG CAPSULE SR 24 HR Take 1 Cap by mouth every day. 30 Cap 11   • loperamide (IMODIUM) 2 MG Cap Take 1 Cap by mouth 4 times a day as needed for Diarrhea. 30 Cap 3   • simvastatin (ZOCOR) 20 MG TABS Take 20 mg by mouth every morning.     • omeprazole (PRILOSEC) 20 MG CPDR Take 20 mg by mouth every day.     • Multiple Vitamin (MULTIVITAMIN PO) Take  by mouth every day.       No current facility-administered medications on file prior to visit.      Blood pressure 126/84, pulse 80, temperature 37 °C (98.6 °F), resp. rate 16, height 1.778 m (5' 10\"), weight 109.3 kg (241 lb), SpO2 93 %.  Family History   Problem Relation Age of Onset   • Heart Disease Mother    • Cancer Father    • Heart Disease Maternal Grandmother    • Heart Disease Maternal Grandfather    • Cancer Paternal Grandfather        Physical Exam:    HEENT: PERRLA, EOMI, no scleral icterus, no nasal or oral lesions  Neck: No thyromegaly, no adenopathy, no bruits.  He has had a left neck dissection  Mallampatti: Grade " II  Lungs: Equal breath sounds, no wheezes or crackles  Heart: Regular rate and rhythm, no gallops or murmurs  Abdomen: Soft, benign, no organomegaly  Extremities: No clubbing, cyanosis, or edema  Neurologic: Cranial nerve, motor, and sensory exam are normal    1. Chronic obstructive pulmonary disease, unspecified COPD type (HCC)    2. SOB (shortness of breath)    3. Need for vaccination    4. Tobacco use    5. History of melanoma    6. Adrenal insufficiency (HCC)    7. BMI 34.0-34.9,adult      He has no evidence of underlying interstitial lung disease.  Pulmonary function testing is compatible with the presence of COPD with a bronchospastic component.  His CT indicates underlying emphysema.  We have urged him to quit smoking.  He is on Spiriva but may benefit from the addition of an inhaled steroid and a long-acting beta agent.  We will give him a trial of Trelegy.  We provided him with an influenza vaccination today.  He will discuss Pneumovax with his primary care physician.  We will reevaluate him in several months.  Patient's body mass index is 34.58 kg/m². Exercise and nutrition counseling were performed at this visit.        Electronically signed by Balaji Musa M.D.  on 09/26/18    No Recipients

## 2018-09-27 NOTE — PROGRESS NOTES
Chief Complaint   Patient presents with   • Results     PFT and CT scan   • Shortness of Breath       HPI:  The patient is a 68-year-old man who has a diagnosis of metastatic melanoma.  He also has a history consistent with COPD.  He has smoked a pack of cigarettes a day for 48 years.  He is still smoking about a half a pack of cigarettes per day.  The patient has been on Spiriva for about a year and uses albuterol as a rescue inhaler.  He does have a chronic cough productive of clear sputum.  He has no history of hemoptysis.  He denies significant wheezing.  He was very short of death during the summer because of poor air quality.  He previously worked as a  in [place].  His diagnosis and subsequent extensive treatment of his malignant melanoma as outlined in Dr. Agee's notes.  He is also been found to have adrenal insufficiency.  He is being followed by Dr. Wilson.  He previously had been on prednisone and tells me that when on prednisone he had no breathing problems.  He is now on hydrocortisone.  Pulmonary function testing today demonstrates an FEV1 of 2.42 L which is 72% of predicted.  His FEV1 FVC ratio is reduced at 52%.  He has 18% improvement after an inhaled bronchodilator.  His DLCO is 80% of predicted.  He is in no distress currently.  His CT scan demonstrates a 5 mm nodule in the lingula with some atelectasis at the bases.  He has no significant infiltrates.  He does have evidence of emphysema.  There is no evidence of any significant interstitial lung disease.    Past Medical History:   Diagnosis Date   • Adrenal disorder (HCC)    • Arrhythmia    • Cancer (HCC)     melanoma in 2007, original 1989 from back of neck   • Heart burn    • Hypertension    • Indigestion    • Metastatic melanoma (HCC) 1990 / 2012    original neck lesion resected 1990 / recurrent lesion resected 2007 / third lesion resected 2012 /recurrence in 2016   • Thyroid disease        ROS:   Constitutional:  Denies fevers, chills, night sweats, fatigue or weight loss  Eyes: Denies vision loss, pain, drainage, double vision  Ears, Nose, Throat: Denies earache, tinnitus, hoarseness  Cardiovascular: Denies chest pain, tightness, palpitations  Respiratory: See HPI  Sleep: Denies, snoring, apnea  GI: Denies abdominal pain, nausea, vomiting, diarrhea  : Denies frequent urination, hematuria, painful urination  Musculoskeletal: Denies back pain, painful joints, sore muscles  Neurological: Denies headaches, seizures  Skin: Denies rashes, color changes  Psychiatric: Denies depression or thoughts of suicide  Hematologic: Denies bleeding tendency or clotting tendency  Allergic/Immunologic: Denies rhinitis, skin sensitivity    Social History     Social History   • Marital status:      Spouse name: N/A   • Number of children: N/A   • Years of education: N/A     Occupational History   • Not on file.     Social History Main Topics   • Smoking status: Current Every Day Smoker     Packs/day: 0.50     Years: 40.00     Types: Cigarettes   • Smokeless tobacco: Never Used   • Alcohol use Yes      Comment: very little   • Drug use: No   • Sexual activity: Not on file     Other Topics Concern   • Not on file     Social History Narrative   • No narrative on file     Sulfa drugs  Current Outpatient Prescriptions on File Prior to Visit   Medication Sig Dispense Refill   • testosterone cypionate (DEPO-TESTOSTERONE) 200 MG/ML Solution injection Inject intramuscularly testosterone 150 mg ( 0.75 mL) every 2 weeks. This is a single dose vial. Discard the remaining contents after one injection. 2 mL 5   • levothyroxine (SYNTHROID) 150 MCG Tab Take 1 Tab by mouth Every morning on an empty stomach. 90 Tab 3   • hydrocortisone (CORTEF) 10 MG Tab take 2 tablets by mouth every morning and 1 tablet every evening 90 Tab 2   • hydrocortisone (CORTEF) 10 MG Tab Take 1 Tab by mouth 3 times a day. 30 Tab 0   • felodipine (PLENDIL) 2.5 MG TABLET SR 24 HR  "Take 2.5 mg by mouth every day.     • levothyroxine (SYNTHROID) 150 MCG Tab      • flecainide (TAMBOCOR) 100 MG Tab Take 100 mg by mouth 2 times a day.     • HYDROcodone/acetaminophen (NORCO)  MG Tab      • aspirin 81 MG EC tablet Take 1 Tab by mouth every day. 100 Tab 3   • flecainide (TAMBOCOR) 50 MG tablet Take 1 Tab by mouth 2 times a day. 60 Tab 11   • predniSONE (DELTASONE) 10 MG Tab Take 5 mg by mouth every day.     • tamsulosin (FLOMAX) 0.4 MG capsule Take 0.4 mg by mouth ONE-HALF HOUR AFTER BREAKFAST.     • diltiazem CD (CARDIZEM CD) 120 MG CAPSULE SR 24 HR Take 1 Cap by mouth every day. 30 Cap 11   • loperamide (IMODIUM) 2 MG Cap Take 1 Cap by mouth 4 times a day as needed for Diarrhea. 30 Cap 3   • simvastatin (ZOCOR) 20 MG TABS Take 20 mg by mouth every morning.     • omeprazole (PRILOSEC) 20 MG CPDR Take 20 mg by mouth every day.     • Multiple Vitamin (MULTIVITAMIN PO) Take  by mouth every day.       No current facility-administered medications on file prior to visit.      Blood pressure 126/84, pulse 80, temperature 37 °C (98.6 °F), resp. rate 16, height 1.778 m (5' 10\"), weight 109.3 kg (241 lb), SpO2 93 %.  Family History   Problem Relation Age of Onset   • Heart Disease Mother    • Cancer Father    • Heart Disease Maternal Grandmother    • Heart Disease Maternal Grandfather    • Cancer Paternal Grandfather        Physical Exam:    HEENT: PERRLA, EOMI, no scleral icterus, no nasal or oral lesions  Neck: No thyromegaly, no adenopathy, no bruits.  He has had a left neck dissection  Mallampatti: Grade II  Lungs: Equal breath sounds, no wheezes or crackles  Heart: Regular rate and rhythm, no gallops or murmurs  Abdomen: Soft, benign, no organomegaly  Extremities: No clubbing, cyanosis, or edema  Neurologic: Cranial nerve, motor, and sensory exam are normal    1. Chronic obstructive pulmonary disease, unspecified COPD type (HCC)    2. SOB (shortness of breath)    3. Need for vaccination    4. " Tobacco use    5. History of melanoma    6. Adrenal insufficiency (HCC)    7. BMI 34.0-34.9,adult      He has no evidence of underlying interstitial lung disease.  Pulmonary function testing is compatible with the presence of COPD with a bronchospastic component.  His CT indicates underlying emphysema.  We have urged him to quit smoking.  He is on Spiriva but may benefit from the addition of an inhaled steroid and a long-acting beta agent.  We will give him a trial of Trelegy.  We provided him with an influenza vaccination today.  He will discuss Pneumovax with his primary care physician.  We will reevaluate him in several months.  Patient's body mass index is 34.58 kg/m². Exercise and nutrition counseling were performed at this visit.

## 2018-10-01 RX ORDER — HYDROCORTISONE 10 MG/1
TABLET ORAL
Qty: 90 TAB | Refills: 2 | Status: SHIPPED | OUTPATIENT
Start: 2018-10-01 | End: 2018-10-15

## 2018-10-04 LAB
ERYTHROCYTE [DISTWIDTH] IN BLOOD BY AUTOMATED COUNT: 14.2 % (ref 12.3–15.4)
ESTRADIOL SERPL-MCNC: 44.3 PG/ML (ref 7.6–42.6)
HCT VFR BLD AUTO: 53.7 % (ref 37.5–51)
HGB BLD-MCNC: 17.8 G/DL (ref 13–17.7)
MCH RBC QN AUTO: 31.3 PG (ref 26.6–33)
MCHC RBC AUTO-ENTMCNC: 33.1 G/DL (ref 31.5–35.7)
MCV RBC AUTO: 94 FL (ref 79–97)
NRBC BLD AUTO-RTO: ABNORMAL %
PLATELET # BLD AUTO: 141 X10E3/UL (ref 150–379)
RBC # BLD AUTO: 5.69 X10E6/UL (ref 4.14–5.8)
TESTOST FREE SERPL-MCNC: 14.3 PG/ML (ref 6.6–18.1)
TESTOST SERPL-MCNC: 911 NG/DL (ref 264–916)
WBC # BLD AUTO: 8.4 X10E3/UL (ref 3.4–10.8)

## 2018-10-15 ENCOUNTER — APPOINTMENT (OUTPATIENT)
Dept: ADMISSIONS | Facility: MEDICAL CENTER | Age: 68
End: 2018-10-15
Attending: OPHTHALMOLOGY
Payer: MEDICARE

## 2018-10-15 RX ORDER — HYDROCODONE BITARTRATE AND ACETAMINOPHEN 7.5; 325 MG/1; MG/1
1-2 TABLET ORAL EVERY 8 HOURS PRN
COMMUNITY
End: 2022-07-08

## 2018-10-16 ENCOUNTER — HOSPITAL ENCOUNTER (OUTPATIENT)
Facility: MEDICAL CENTER | Age: 68
End: 2018-10-16
Attending: OPHTHALMOLOGY | Admitting: OPHTHALMOLOGY
Payer: MEDICARE

## 2018-10-16 VITALS
HEIGHT: 72 IN | WEIGHT: 245.59 LBS | OXYGEN SATURATION: 93 % | HEART RATE: 70 BPM | BODY MASS INDEX: 33.26 KG/M2 | RESPIRATION RATE: 12 BRPM | TEMPERATURE: 98.6 F

## 2018-10-16 LAB — EKG IMPRESSION: NORMAL

## 2018-10-16 PROCEDURE — 700101 HCHG RX REV CODE 250

## 2018-10-16 PROCEDURE — 501749 HCHG SHELL REV 276: Performed by: OPHTHALMOLOGY

## 2018-10-16 PROCEDURE — 160048 HCHG OR STATISTICAL LEVEL 1-5: Performed by: OPHTHALMOLOGY

## 2018-10-16 PROCEDURE — 500855 HCHG NEEDLE, IRRIG CYSTOTOME 27G: Performed by: OPHTHALMOLOGY

## 2018-10-16 PROCEDURE — 160002 HCHG RECOVERY MINUTES (STAT): Performed by: OPHTHALMOLOGY

## 2018-10-16 PROCEDURE — 160025 RECOVERY II MINUTES (STATS): Performed by: OPHTHALMOLOGY

## 2018-10-16 PROCEDURE — 99152 MOD SED SAME PHYS/QHP 5/>YRS: CPT | Performed by: OPHTHALMOLOGY

## 2018-10-16 PROCEDURE — 160046 HCHG PACU - 1ST 60 MINS PHASE II: Performed by: OPHTHALMOLOGY

## 2018-10-16 PROCEDURE — 700111 HCHG RX REV CODE 636 W/ 250 OVERRIDE (IP)

## 2018-10-16 PROCEDURE — 160029 HCHG SURGERY MINUTES - 1ST 30 MINS LEVEL 4: Performed by: OPHTHALMOLOGY

## 2018-10-16 PROCEDURE — 160035 HCHG PACU - 1ST 60 MINS PHASE I: Performed by: OPHTHALMOLOGY

## 2018-10-16 PROCEDURE — 93010 ELECTROCARDIOGRAM REPORT: CPT | Performed by: INTERNAL MEDICINE

## 2018-10-16 PROCEDURE — 93005 ELECTROCARDIOGRAM TRACING: CPT | Performed by: OPHTHALMOLOGY

## 2018-10-16 PROCEDURE — 500791 HCHG KNIFE, SLIT: Performed by: OPHTHALMOLOGY

## 2018-10-16 DEVICE — IMPLANTABLE DEVICE: Type: IMPLANTABLE DEVICE | Site: EYE | Status: FUNCTIONAL

## 2018-10-16 RX ORDER — HALOPERIDOL 5 MG/ML
1 INJECTION INTRAMUSCULAR
Status: DISCONTINUED | OUTPATIENT
Start: 2018-10-16 | End: 2018-10-16 | Stop reason: HOSPADM

## 2018-10-16 RX ORDER — KETOROLAC TROMETHAMINE 5 MG/ML
SOLUTION OPHTHALMIC
Status: COMPLETED
Start: 2018-10-16 | End: 2018-10-16

## 2018-10-16 RX ORDER — TROPICAMIDE 10 MG/ML
SOLUTION/ DROPS OPHTHALMIC
Status: COMPLETED
Start: 2018-10-16 | End: 2018-10-16

## 2018-10-16 RX ORDER — PHENYLEPHRINE HYDROCHLORIDE 25 MG/ML
SOLUTION/ DROPS OPHTHALMIC
Status: COMPLETED
Start: 2018-10-16 | End: 2018-10-16

## 2018-10-16 RX ORDER — MOXIFLOXACIN 5 MG/ML
SOLUTION/ DROPS OPHTHALMIC
Status: COMPLETED
Start: 2018-10-16 | End: 2018-10-16

## 2018-10-16 RX ORDER — TETRACAINE HYDROCHLORIDE 5 MG/ML
SOLUTION OPHTHALMIC
Status: COMPLETED
Start: 2018-10-16 | End: 2018-10-16

## 2018-10-16 RX ORDER — SODIUM CHLORIDE, SODIUM LACTATE, POTASSIUM CHLORIDE, CALCIUM CHLORIDE 600; 310; 30; 20 MG/100ML; MG/100ML; MG/100ML; MG/100ML
INJECTION, SOLUTION INTRAVENOUS ONCE
Status: COMPLETED | OUTPATIENT
Start: 2018-10-16 | End: 2018-10-16

## 2018-10-16 RX ORDER — ONDANSETRON 2 MG/ML
4 INJECTION INTRAMUSCULAR; INTRAVENOUS
Status: DISCONTINUED | OUTPATIENT
Start: 2018-10-16 | End: 2018-10-16 | Stop reason: HOSPADM

## 2018-10-16 RX ADMIN — TETRACAINE HYDROCHLORIDE 1 DROP: 5 SOLUTION OPHTHALMIC at 11:58

## 2018-10-16 RX ADMIN — KETOROLAC TROMETHAMINE 1 DROP: 5 SOLUTION OPHTHALMIC at 11:58

## 2018-10-16 RX ADMIN — MOXIFLOXACIN HYDROCHLORIDE 1 DROP: 5 SOLUTION/ DROPS OPHTHALMIC at 11:58

## 2018-10-16 RX ADMIN — SODIUM CHLORIDE, SODIUM LACTATE, POTASSIUM CHLORIDE, CALCIUM CHLORIDE 1000 ML: 600; 310; 30; 20 INJECTION, SOLUTION INTRAVENOUS at 12:23

## 2018-10-16 RX ADMIN — PHENYLEPHRINE HYDROCHLORIDE 1 DROP: 2.5 SOLUTION/ DROPS OPHTHALMIC at 11:58

## 2018-10-16 RX ADMIN — TROPICAMIDE 1 DROP: 10 SOLUTION/ DROPS OPHTHALMIC at 11:58

## 2018-10-16 ASSESSMENT — PAIN SCALES - GENERAL: PAINLEVEL_OUTOF10: 0

## 2018-10-16 NOTE — DISCHARGE INSTRUCTIONS
HOME CARE INSTRUCTIONS FOR CATARACT SURGERY    ACTIVITY: Rest and take it easy for the first 24 hours. We strongly suggest that a responsible adult remain with you during that time. It is normal to feel sleepy. We encourage you to not do anything that requires balance, judgment or coordination. Be extra careful when walking (with a dilated eye, it is easier to trip and fall).     FOR 24 HOURS, DO NOT:       Drive, operate machinery or run household appliances.        Drink beer or alcoholic beverages.        Make important decisions or sign legal documents.     DIET: To avoid nausea, slowly advance diet as tolerated, avoiding spicy or greasy foods for the first meal.     MEDICATIONS: Resume taking daily medication. You may take Tylenol for mild discomfort, if needed.     SURGICAL DRESSING: Eye shield as instructed by your doctor. Dark glasses should be worn while in the sunlight.     Follow your Physician's instruction Sheet. Eye Kit Given    A follow-up appointment is scheduled with your doctor tomorrow at _______ am/pm.     You should call 911 if you develop problems with breathing or chest pain.  You should CALL YOUR PHYSICIAN if you develop: Sharp stabbing pain or sudden change in vision in your operative eye. If you are unable to contact your doctor or the surgical center, you should go to the nearest emergency room or urgent care center. Physician's telephone # _________[phone]_________________    If any questions arise, call your doctor. If your doctor is not available, please feel free to call Same Day Surgery at [phone]. You can also call the "[employer]" Hotline open 24 hours/day, 7 days/week and speak to a nurse at [phone] or [phone].     I acknowledge receipt and understanding of these Home Care Instructions.    ______________________________     _______________________________            Signature of Patient / Responsible Adult                                                       RN  Signature    A registered nurse may call you a few days after your surgery to see how you are doing.   You may also receive a survey in the mail within the next two weeks and we ask that you take a few moments to complete and return the survey. Our goal is to provide you with very good care and we value your comments. Thank you for choosing [facility].

## 2018-10-16 NOTE — OR NURSING
1350 Pt transferred to PACU. Report received from OR RN and anesthesia. Pt is awake and alert. VS stable, respirations even and unlabored. No bleeding, eye is dilated. No complaints of pain.     1406 Pt educated on discharge instructions. Verbalized understanding. All questions answered. All belongings are with patient.

## 2018-10-17 ENCOUNTER — OFFICE VISIT (OUTPATIENT)
Dept: ENDOCRINOLOGY | Facility: MEDICAL CENTER | Age: 68
End: 2018-10-17
Payer: MEDICARE

## 2018-10-17 VITALS
DIASTOLIC BLOOD PRESSURE: 84 MMHG | HEART RATE: 99 BPM | WEIGHT: 245 LBS | BODY MASS INDEX: 33.18 KG/M2 | HEIGHT: 72 IN | OXYGEN SATURATION: 92 % | SYSTOLIC BLOOD PRESSURE: 122 MMHG

## 2018-10-17 DIAGNOSIS — E29.1 PRIMARY TESTICULAR HYPOGONADISM: Primary | ICD-10-CM

## 2018-10-17 DIAGNOSIS — D75.1 POLYCYTHEMIA: ICD-10-CM

## 2018-10-17 DIAGNOSIS — E28.0 ESTRADIOL EXCESS: ICD-10-CM

## 2018-10-17 PROCEDURE — 99213 OFFICE O/P EST LOW 20 MIN: CPT | Performed by: INTERNAL MEDICINE

## 2018-10-21 DIAGNOSIS — I10 ESSENTIAL HYPERTENSION: ICD-10-CM

## 2018-10-21 DIAGNOSIS — I48.91 ATRIAL FIBRILLATION WITH RAPID VENTRICULAR RESPONSE (HCC): ICD-10-CM

## 2018-10-21 NOTE — PROGRESS NOTES
Chief Complaint   Patient presents with   • Hypogonadism   • Adrenal Insufficiency        HPI:        1. Low testosterone.    The patient has had an excellent response to testosterone injections 150 mg every two weeks.  He has gotten increasing strength and definite increase in libido.  He definitely does not want to give up on his testosterone.  However we are running into some difficulties. #1 he has a hemoglobin of 17.8 and hematocrit of 53.  I explained that this polycythemia is probably a combination of testosterone and some degree of pulmonary insufficiency with intermittent hypoxia. He is right on the borderline of requiring a therapeutic phlebotomy.     Another problem is that his estradiol level has gone up to 44 not particularly severe but that can present a problem with gynecomastia.       One solution is to reduce his dose of testosterone or spread the testosterone out over the month in three injections rather than two injections and avoid the peaks that come with the higher doses.  He would opt for the latter.  So we will decrease his dose to 100 mg but increase the frequency to every ten days.     He tells me that he does not have nocturnal hypoxia because Dr. Madison has checked on this a number of times.  He sleeps with oxygen and he does not become hypoxic.  He knows that and his wife does not think he has sleep apnea.  So we will let that go as is.      Because of his history of melanoma, he cannot donate blood to get rid of any but we might have to do a therapeutic phlebotomy depending on how these levels go.  We will find out more next month with this dose adjustment.     ROS:   All other systems reported as negative or unchanged since last exam      Allergies:   Allergies   Allergen Reactions   • Sulfa Drugs      Severe muscle pains       Current medicines including changes today:  Current Outpatient Prescriptions   Medication Sig Dispense Refill   • HYDROcodone-acetaminophen (NORCO) 7.5-325 MG  per tablet Take 1-2 Tabs by mouth every 8 hours as needed.     • Fluticasone-Umeclidin-Vilant (TRELEGY ELLIPTA) 100-62.5-25 MCG/INH AEROSOL POWDER, BREATH ACTIVATED Inhale 1 Puff by mouth every day. 2 Each 0   • testosterone cypionate (DEPO-TESTOSTERONE) 200 MG/ML Solution injection Inject intramuscularly testosterone 150 mg ( 0.75 mL) every 2 weeks. This is a single dose vial. Discard the remaining contents after one injection. 2 mL 5   • levothyroxine (SYNTHROID) 150 MCG Tab Take 1 Tab by mouth Every morning on an empty stomach. 90 Tab 3   • hydrocortisone (CORTEF) 10 MG Tab Take 1 Tab by mouth 3 times a day. (Patient taking differently: Take 10 mg by mouth 3 times a day. 20 mg  AM, 10 mg PM) 30 Tab 0   • felodipine (PLENDIL) 2.5 MG TABLET SR 24 HR Take 2.5 mg by mouth every day.     • flecainide (TAMBOCOR) 100 MG Tab Take 100 mg by mouth 2 times a day.     • aspirin 81 MG EC tablet Take 1 Tab by mouth every day. 100 Tab 3   • tamsulosin (FLOMAX) 0.4 MG capsule Take 0.4 mg by mouth ONE-HALF HOUR AFTER BREAKFAST.     • diltiazem CD (CARDIZEM CD) 120 MG CAPSULE SR 24 HR Take 1 Cap by mouth every day. 30 Cap 11   • simvastatin (ZOCOR) 20 MG TABS Take 20 mg by mouth every morning.     • omeprazole (PRILOSEC) 20 MG CPDR Take 20 mg by mouth every day.     • Multiple Vitamin (MULTIVITAMIN PO) Take  by mouth every day.       No current facility-administered medications for this visit.         Past Medical History:   Diagnosis Date   • Adrenal disorder (HCC)    • Arrhythmia    • Breath shortness    • Cancer (HCC)     melanoma in 2007, original 1989 from back of neck (chemo)   • Cataract    • Emphysema of lung (HCC)    • Heart burn    • Hypertension    • Indigestion    • Metastatic melanoma (HCC) 1990 / 2012    original neck lesion resected 1990 / recurrent lesion resected 2007 / third lesion resected 2012 /recurrence in 2016   • Thyroid disease        PHYSICAL EXAM:    /84 (BP Location: Left arm)   Pulse 99   Ht  1.829 m (6')   Wt 111.1 kg (245 lb)   SpO2 92%   BMI 33.23 kg/m²      Gen.   appears healthy     Skin   appropriate for sex and age    HEENT  unremarkable    Neck   no adenopathy    Breasts normal male no gynecomastia    Heart  regular    Extremities  no edema    Neuro  gait and station normal    Psych  appropriate      ASSESSMENT AND RECOMMENDATIONS    1. Polycythemia    - CBC WITHOUT DIFFERENTIAL; Future    2. Estradiol excess    - ESTRADIOL; Future    3. Primary testicular hypogonadism    - TESTOSTERONE,FREE ADULT MALE; Future      DISPOSITION: See discussion above                             Return in one month       Paco Manrique M.D.    Copies to: Yon Madison M.D. [phone]

## 2018-10-22 RX ORDER — DILTIAZEM HYDROCHLORIDE 120 MG/1
CAPSULE, EXTENDED RELEASE ORAL
Qty: 90 CAP | Refills: 3 | Status: SHIPPED | OUTPATIENT
Start: 2018-10-22 | End: 2019-05-21 | Stop reason: SDUPTHER

## 2018-11-06 ENCOUNTER — HOSPITAL ENCOUNTER (OUTPATIENT)
Facility: MEDICAL CENTER | Age: 68
End: 2018-11-06
Attending: OPHTHALMOLOGY | Admitting: OPHTHALMOLOGY
Payer: MEDICARE

## 2018-11-06 VITALS
BODY MASS INDEX: 32.13 KG/M2 | HEIGHT: 72 IN | OXYGEN SATURATION: 92 % | HEART RATE: 75 BPM | RESPIRATION RATE: 14 BRPM | TEMPERATURE: 97.6 F | WEIGHT: 237.22 LBS

## 2018-11-06 PROCEDURE — 160025 RECOVERY II MINUTES (STATS): Performed by: OPHTHALMOLOGY

## 2018-11-06 PROCEDURE — 700101 HCHG RX REV CODE 250

## 2018-11-06 PROCEDURE — 160002 HCHG RECOVERY MINUTES (STAT): Performed by: OPHTHALMOLOGY

## 2018-11-06 PROCEDURE — 99152 MOD SED SAME PHYS/QHP 5/>YRS: CPT | Performed by: OPHTHALMOLOGY

## 2018-11-06 PROCEDURE — 160048 HCHG OR STATISTICAL LEVEL 1-5: Performed by: OPHTHALMOLOGY

## 2018-11-06 PROCEDURE — 160029 HCHG SURGERY MINUTES - 1ST 30 MINS LEVEL 4: Performed by: OPHTHALMOLOGY

## 2018-11-06 PROCEDURE — 160035 HCHG PACU - 1ST 60 MINS PHASE I: Performed by: OPHTHALMOLOGY

## 2018-11-06 PROCEDURE — 500791 HCHG KNIFE, SLIT: Performed by: OPHTHALMOLOGY

## 2018-11-06 PROCEDURE — 160046 HCHG PACU - 1ST 60 MINS PHASE II: Performed by: OPHTHALMOLOGY

## 2018-11-06 PROCEDURE — 700111 HCHG RX REV CODE 636 W/ 250 OVERRIDE (IP)

## 2018-11-06 PROCEDURE — 501749 HCHG SHELL REV 276: Performed by: OPHTHALMOLOGY

## 2018-11-06 PROCEDURE — 500855 HCHG NEEDLE, IRRIG CYSTOTOME 27G: Performed by: OPHTHALMOLOGY

## 2018-11-06 DEVICE — IMPLANTABLE DEVICE: Type: IMPLANTABLE DEVICE | Site: EYE | Status: FUNCTIONAL

## 2018-11-06 RX ORDER — SODIUM CHLORIDE, SODIUM LACTATE, POTASSIUM CHLORIDE, CALCIUM CHLORIDE 600; 310; 30; 20 MG/100ML; MG/100ML; MG/100ML; MG/100ML
INJECTION, SOLUTION INTRAVENOUS ONCE
Status: COMPLETED | OUTPATIENT
Start: 2018-11-06 | End: 2018-11-06

## 2018-11-06 RX ORDER — PHENYLEPHRINE HYDROCHLORIDE 25 MG/ML
SOLUTION/ DROPS OPHTHALMIC
Status: COMPLETED
Start: 2018-11-06 | End: 2018-11-06

## 2018-11-06 RX ORDER — TROPICAMIDE 10 MG/ML
SOLUTION/ DROPS OPHTHALMIC
Status: COMPLETED
Start: 2018-11-06 | End: 2018-11-06

## 2018-11-06 RX ORDER — TETRACAINE HYDROCHLORIDE 5 MG/ML
SOLUTION OPHTHALMIC
Status: COMPLETED
Start: 2018-11-06 | End: 2018-11-06

## 2018-11-06 RX ORDER — MOXIFLOXACIN 5 MG/ML
SOLUTION/ DROPS OPHTHALMIC
Status: COMPLETED
Start: 2018-11-06 | End: 2018-11-06

## 2018-11-06 RX ORDER — KETOROLAC TROMETHAMINE 5 MG/ML
SOLUTION OPHTHALMIC
Status: COMPLETED
Start: 2018-11-06 | End: 2018-11-06

## 2018-11-06 RX ADMIN — TROPICAMIDE 1 DROP: 10 SOLUTION/ DROPS OPHTHALMIC at 11:15

## 2018-11-06 RX ADMIN — KETOROLAC TROMETHAMINE 1 DROP: 5 SOLUTION OPHTHALMIC at 11:15

## 2018-11-06 RX ADMIN — SODIUM CHLORIDE, SODIUM LACTATE, POTASSIUM CHLORIDE, CALCIUM CHLORIDE: 600; 310; 30; 20 INJECTION, SOLUTION INTRAVENOUS at 11:36

## 2018-11-06 RX ADMIN — MOXIFLOXACIN HYDROCHLORIDE 1 DROP: 5 SOLUTION/ DROPS OPHTHALMIC at 11:15

## 2018-11-06 RX ADMIN — TETRACAINE HYDROCHLORIDE 1 DROP: 5 SOLUTION OPHTHALMIC at 11:15

## 2018-11-06 RX ADMIN — PHENYLEPHRINE HYDROCHLORIDE 1 DROP: 2.5 SOLUTION/ DROPS OPHTHALMIC at 11:15

## 2018-11-06 ASSESSMENT — PAIN SCALES - GENERAL
PAINLEVEL_OUTOF10: 0

## 2018-11-06 NOTE — OR NURSING
1311 Pt arrived from OR awake and alert.  NO complaints of pain. Left eye dilated and no drainage noted.    1332 Wife at bedside, pt tolerating PO.  Discharge instructions read.  Pt meets discharge criteria    1335 Pt discharged to home with eye kit

## 2018-11-06 NOTE — DISCHARGE INSTRUCTIONS
HOME CARE INSTRUCTIONS FOR CATARACT SURGERY    ACTIVITY: Rest and take it easy for the first 24 hours. We strongly suggest that a responsible adult remain with you during that time. It is normal to feel sleepy. We encourage you to not do anything that requires balance, judgment or coordination. Be extra careful when walking (with a dilated eye, it is easier to trip and fall).     FOR 24 HOURS, DO NOT:       Drive, operate machinery or run household appliances.        Drink beer or alcoholic beverages.        Make important decisions or sign legal documents.     DIET: To avoid nausea, slowly advance diet as tolerated, avoiding spicy or greasy foods for the first meal.     MEDICATIONS: Resume taking daily medication. You may take Tylenol for mild discomfort, if needed.     SURGICAL DRESSING: Eye shield as instructed by your doctor. Dark glasses should be worn while in the sunlight.     Follow your Physician's instruction Sheet. Eye Kit Given    A follow-up appointment is scheduled with your doctor tomorrow at _______ am/pm.     You should call 911 if you develop problems with breathing or chest pain.  You should CALL YOUR PHYSICIAN if you develop: Sharp stabbing pain or sudden change in vision in your operative eye. If you are unable to contact your doctor or the surgical center, you should go to the nearest emergency room or urgent care center. Physician's telephone # __________________________    If any questions arise, call your doctor. If your doctor is not available, please feel free to call Same Day Surgery at [phone]. You can also call the [employer] Hotline open 24 hours/day, 7 days/week and speak to a nurse at [phone] or [phone].     I acknowledge receipt and understanding of these Home Care Instructions.    ______________________________     _______________________________            Signature of Patient / Responsible Adult                                                       RN Signature    A  registered nurse may call you a few days after your surgery to see how you are doing.   You may also receive a survey in the mail within the next two weeks and we ask that you take a few moments to complete and return the survey. Our goal is to provide you with very good care and we value your comments. Thank you for choosing [facility].

## 2018-11-09 ENCOUNTER — TELEPHONE (OUTPATIENT)
Dept: ENDOCRINOLOGY | Facility: MEDICAL CENTER | Age: 68
End: 2018-11-09

## 2018-11-09 DIAGNOSIS — E29.1 PRIMARY TESTICULAR HYPOGONADISM: Primary | ICD-10-CM

## 2018-11-09 RX ORDER — TESTOSTERONE CYPIONATE 200 MG/ML
100 INJECTION, SOLUTION INTRAMUSCULAR
Qty: 3 VIAL | Refills: 4 | Status: SHIPPED | OUTPATIENT
Start: 2018-11-09 | End: 2018-12-09

## 2018-11-09 NOTE — TELEPHONE ENCOUNTER
Was the patient seen in the last year in this department? Yes    Does patient have an active prescription for medications requested? Yes    Received Request Via: Patient     Patient needs the Testosterone rx to be updated. He states he is injecting .50 mg every 10 days.

## 2018-11-16 LAB
BASOPHILS # BLD AUTO: 0 X10E3/UL (ref 0–0.2)
BASOPHILS NFR BLD AUTO: 0 %
EOSINOPHIL # BLD AUTO: 0.1 X10E3/UL (ref 0–0.4)
EOSINOPHIL NFR BLD AUTO: 1 %
ERYTHROCYTE [DISTWIDTH] IN BLOOD BY AUTOMATED COUNT: 13.8 % (ref 12.3–15.4)
ESTRADIOL SERPL-MCNC: 25.8 PG/ML (ref 7.6–42.6)
HCT VFR BLD AUTO: 54.4 % (ref 37.5–51)
HGB BLD-MCNC: 18.4 G/DL (ref 13–17.7)
IMM GRANULOCYTES # BLD: ABNORMAL 10*3/UL
IMM GRANULOCYTES NFR BLD: ABNORMAL %
IMMATURE CELLS  115398: ABNORMAL
LYMPHOCYTES # BLD AUTO: 2.8 X10E3/UL (ref 0.7–3.1)
LYMPHOCYTES NFR BLD AUTO: 25 %
MCH RBC QN AUTO: 31.2 PG (ref 26.6–33)
MCHC RBC AUTO-ENTMCNC: 33.8 G/DL (ref 31.5–35.7)
MCV RBC AUTO: 92 FL (ref 79–97)
MONOCYTES # BLD AUTO: 0.9 X10E3/UL (ref 0.1–0.9)
MONOCYTES NFR BLD AUTO: 8 %
MORPHOLOGY BLD-IMP: ABNORMAL
NEUTROPHILS # BLD AUTO: 7.3 X10E3/UL (ref 1.4–7)
NEUTROPHILS NFR BLD AUTO: 66 %
NRBC BLD AUTO-RTO: ABNORMAL %
PLATELET # BLD AUTO: 118 X10E3/UL (ref 150–379)
RBC # BLD AUTO: 5.89 X10E6/UL (ref 4.14–5.8)
TESTOST FREE SERPL-MCNC: 4.2 PG/ML (ref 6.6–18.1)
WBC # BLD AUTO: 11 X10E3/UL (ref 3.4–10.8)

## 2018-11-28 ENCOUNTER — OFFICE VISIT (OUTPATIENT)
Dept: ENDOCRINOLOGY | Facility: MEDICAL CENTER | Age: 68
End: 2018-11-28
Payer: MEDICARE

## 2018-11-28 VITALS
WEIGHT: 239.8 LBS | HEIGHT: 72 IN | SYSTOLIC BLOOD PRESSURE: 122 MMHG | HEART RATE: 92 BPM | DIASTOLIC BLOOD PRESSURE: 74 MMHG | BODY MASS INDEX: 32.48 KG/M2

## 2018-11-28 DIAGNOSIS — C43.9 METASTATIC MELANOMA (HCC): ICD-10-CM

## 2018-11-28 DIAGNOSIS — E29.1 PRIMARY TESTICULAR HYPOGONADISM: ICD-10-CM

## 2018-11-28 DIAGNOSIS — E27.40 ADRENAL INSUFFICIENCY (HCC): ICD-10-CM

## 2018-11-28 DIAGNOSIS — I20.0 UNSTABLE ANGINA PECTORIS (HCC): ICD-10-CM

## 2018-11-28 DIAGNOSIS — I48.91 ATRIAL FIBRILLATION WITH RAPID VENTRICULAR RESPONSE (HCC): ICD-10-CM

## 2018-11-28 DIAGNOSIS — E28.0 ESTRADIOL EXCESS: ICD-10-CM

## 2018-11-28 DIAGNOSIS — D75.1 POLYCYTHEMIA: Primary | ICD-10-CM

## 2018-11-28 PROCEDURE — 99214 OFFICE O/P EST MOD 30 MIN: CPT | Performed by: INTERNAL MEDICINE

## 2018-11-29 NOTE — PROGRESS NOTES
Chief Complaint   Patient presents with   • Hypogonadism        HPI:    Endocrine status reviewed.  Current problems stem from the recurrence of metastatic melanoma which was originally resected in 1990 and recurred 17 years later as metastatic disease originally resected from metastatic locations and ultimately I believe in 2012 treated with immunotherapy.  This resulted in adrenal insufficiency which was replaced appropriately.  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  He has been on testosterone replacement and considers that a definite benefit.  It has rejuvenated him in terms of strength and energy, libido and attitude.  He has been doing very well with his testosterone replacement but has developed polycythemia.  He insists that he does not have sleep apnea which has been evaluated by Dr. Madison but he does have nocturnal hypoxia for which he uses nighttime 02 therapy.  His wife does not think he has apneic episodes at night.  In any event, I think the combination of testosterone and intermittent hypoxia has led to polycythemia.  His most recent hemoglobin done two weeks ago is 18.4 and hematocrit 54.  We have adjusted his testosterone dose a little bit by increasing the injection interval from one week to ten days.  He still gets beneficial response.  He was experiencing an elevated estradiol up to 44 in October which is another reason to lower his dose.  The estradiol is now down to 25.  Free testosterone is low normal at 4.2 but I think that was at the radha of an injection.     I have asked him to do a therapeutic phlebotomy.  I think he could get into trouble with his polycythemia if we don’t modify the program so we are reducing the total dose of testosterone some and will a therapeutic phlebotomy and perhaps we won’t have to do another.  He is in agreement with this approach.     ROS:  All other systems reported as negative or unchanged  since last exam      Allergies:   Allergies   Allergen Reactions   • Sulfa Drugs      Severe muscle pains       Current medicines including changes today:  Current Outpatient Prescriptions   Medication Sig Dispense Refill   • testosterone cypionate (DEPO-TESTOSTERONE) 200 MG/ML Solution injection 0.5 mL by Intramuscular route every 10 days for 30 days. 3 Vial 4   • CARTIA  MG CAPSULE SR 24 HR take 1 capsule by mouth once daily 90 Cap 3   • HYDROcodone-acetaminophen (NORCO) 7.5-325 MG per tablet Take 1-2 Tabs by mouth every 8 hours as needed.     • Fluticasone-Umeclidin-Vilant (TRELEGY ELLIPTA) 100-62.5-25 MCG/INH AEROSOL POWDER, BREATH ACTIVATED Inhale 1 Puff by mouth every day. 2 Each 0   • levothyroxine (SYNTHROID) 150 MCG Tab Take 1 Tab by mouth Every morning on an empty stomach. 90 Tab 3   • hydrocortisone (CORTEF) 10 MG Tab Take 1 Tab by mouth 3 times a day. (Patient taking differently: Take 7.5 mg by mouth 3 times a day. 20 mg  AM, 10 mg PM) 30 Tab 0   • felodipine (PLENDIL) 2.5 MG TABLET SR 24 HR Take 2.5 mg by mouth every day.     • flecainide (TAMBOCOR) 100 MG Tab Take 100 mg by mouth 2 times a day.     • aspirin 81 MG EC tablet Take 1 Tab by mouth every day. 100 Tab 3   • simvastatin (ZOCOR) 20 MG TABS Take 20 mg by mouth every morning.     • omeprazole (PRILOSEC) 20 MG CPDR Take 20 mg by mouth every day.     • Multiple Vitamin (MULTIVITAMIN PO) Take  by mouth every day.     • tamsulosin (FLOMAX) 0.4 MG capsule Take 0.4 mg by mouth ONE-HALF HOUR AFTER BREAKFAST.       No current facility-administered medications for this visit.         Past Medical History:   Diagnosis Date   • Adrenal disorder (HCC)    • Arrhythmia    • Breath shortness    • Cancer (HCC)     melanoma in 2007, original 1989 from back of neck (chemo)   • Cataract    • Emphysema of lung (HCC)    • Heart burn    • Hypertension    • Indigestion    • Metastatic melanoma (HCC) 1990 / 2012    original neck lesion resected 1990 / recurrent  lesion resected 2007 / third lesion resected 2012 /recurrence in 2016   • Thyroid disease        PHYSICAL EXAM:    /74 (BP Location: Left arm, Patient Position: Sitting, BP Cuff Size: Adult)   Pulse 92   Ht 1.829 m (6')   Wt 108.8 kg (239 lb 12.8 oz)   BMI 32.52 kg/m²     Gen.   appears healthy     Skin   appropriate for sex and age    Heart  regular    Extremities  no edema    Neuro  gait and station normal    Psych  appropriate, good spirits      ASSESSMENT AND RECOMMENDATIONS    1. Primary testicular hypogonadism          Secondary to immunotherapy           Question of appropriate replacement testosterone dose.  See HPI above  - TESTOSTERONE,FREE ADULT MALE; Future    2. Adrenal insufficiency (HCC)             Related to immune therapy             Well-controlled on hydrocortisone.               - BASIC METABOLIC PANEL; Future    3. Polycythemia            Combination of testosterone therapy and nocturnal hypoxia            Therapeutic phlebotomy indicated with hemoglobin 18.4 and hematocrit 54  - CBC WITHOUT DIFFERENTIAL; Future    4. Estradiol excess             Estradiol level has gone down with testosterone dose adjustment  - ESTRADIOL; Future    5. Metastatic melanoma (HCC)            No evidence of recurrence since immunotherapy.            Followed by Dr. Agee      DISPOSITION: Repeat lab in 2 months                            Follow-up by my chart or telephone.                            Return to office in 6 months                                 Paco Manrique M.D.    Copies to: Yon Madison M.D. [phone]                   Dr. Shane Agee

## 2018-11-30 RX ORDER — FLECAINIDE ACETATE 100 MG/1
TABLET ORAL
Qty: 180 TAB | Refills: 2 | Status: SHIPPED | OUTPATIENT
Start: 2018-11-30 | End: 2019-01-14

## 2018-12-28 ENCOUNTER — OFFICE VISIT (OUTPATIENT)
Dept: PULMONOLOGY | Facility: HOSPICE | Age: 68
End: 2018-12-28
Payer: MEDICARE

## 2018-12-28 VITALS
WEIGHT: 233 LBS | HEIGHT: 72 IN | TEMPERATURE: 98.2 F | HEART RATE: 76 BPM | SYSTOLIC BLOOD PRESSURE: 134 MMHG | BODY MASS INDEX: 31.56 KG/M2 | RESPIRATION RATE: 16 BRPM | OXYGEN SATURATION: 96 % | DIASTOLIC BLOOD PRESSURE: 82 MMHG

## 2018-12-28 DIAGNOSIS — R09.02 HYPOXEMIA: ICD-10-CM

## 2018-12-28 DIAGNOSIS — R06.02 SOB (SHORTNESS OF BREATH): ICD-10-CM

## 2018-12-28 DIAGNOSIS — Z85.820 HISTORY OF MELANOMA: ICD-10-CM

## 2018-12-28 DIAGNOSIS — E27.40 ADRENAL INSUFFICIENCY (HCC): ICD-10-CM

## 2018-12-28 DIAGNOSIS — D75.1 POLYCYTHEMIA: ICD-10-CM

## 2018-12-28 DIAGNOSIS — J44.9 CHRONIC OBSTRUCTIVE PULMONARY DISEASE, UNSPECIFIED COPD TYPE (HCC): ICD-10-CM

## 2018-12-28 DIAGNOSIS — Z72.0 TOBACCO USE: ICD-10-CM

## 2018-12-28 PROCEDURE — 99214 OFFICE O/P EST MOD 30 MIN: CPT | Performed by: INTERNAL MEDICINE

## 2018-12-28 RX ORDER — ONDANSETRON 4 MG/1
4 TABLET, ORALLY DISINTEGRATING ORAL EVERY 6 HOURS PRN
Refills: 0 | COMMUNITY
Start: 2018-09-30 | End: 2021-12-16

## 2018-12-28 RX ORDER — SIMVASTATIN 10 MG
TABLET ORAL
COMMUNITY
End: 2019-05-21

## 2018-12-28 RX ORDER — HYDROCORTISONE 10 MG/1
TABLET ORAL
Qty: 90 TAB | Refills: 2 | Status: SHIPPED | OUTPATIENT
Start: 2018-12-28 | End: 2019-03-17 | Stop reason: SDUPTHER

## 2018-12-28 RX ORDER — SYRINGE WITH NEEDLE, 1 ML 25GX5/8"
SYRINGE, EMPTY DISPOSABLE MISCELLANEOUS
Refills: 0 | COMMUNITY
Start: 2018-12-05 | End: 2019-02-26 | Stop reason: SDUPTHER

## 2018-12-28 RX ORDER — TESTOSTERONE CYPIONATE 200 MG/ML
INJECTION, SOLUTION INTRAMUSCULAR
Refills: 0 | COMMUNITY
Start: 2018-12-11 | End: 2019-04-16 | Stop reason: SDUPTHER

## 2018-12-28 ASSESSMENT — PAIN SCALES - GENERAL: PAINLEVEL: NO PAIN

## 2018-12-28 NOTE — TELEPHONE ENCOUNTER
Was the patient seen in the last year in this department? Yes    Does patient have an active prescription for medications requested? No     Received Request Via: Pharmacy       Hydrocortisone 10 MG take 2 tablets by mouth every morning and 1 tablet every evening

## 2018-12-28 NOTE — PROGRESS NOTES
Chief Complaint   Patient presents with   • Follow-Up     COPD       HPI:  The patient is a 68-year-old man who has a diagnosis of metastatic melanoma.  He also has a history consistent with COPD.  He has smoked a pack of cigarettes a day for 48 years.  He is still smoking about a half a pack of cigarettes per day.   He does have a chronic cough productive of clear sputum.  He has no history of hemoptysis.  He denies significant wheezing.  He was very short of breath during the summer because of poor air quality.  He previously worked as a  in [place].  His diagnosis and subsequent extensive treatment of his malignant melanoma as outlined in Dr. Agee's notes.  He is also been found to have adrenal insufficiency.  He is being followed by Dr. Wilson.  He previously had been on prednisone and tells me that when on prednisone he had no breathing problems.  He is now on hydrocortisone.  Pulmonary function testing today demonstrates an FEV1 of 2.42 L which is 72% of predicted.  His FEV1 FVC ratio is reduced at 52%.  He has 18% improvement after an inhaled bronchodilator.  His DLCO is 80% of predicted.  He is in no distress currently.  His CT scan demonstrates a 5 mm nodule in the lingula with some atelectasis at the bases.  He has no significant infiltrates.  He does have evidence of emphysema.  There is no evidence of any significant interstitial lung disease.  After his last visit she had a treatment with Trelegy.  He feels that it has helped significantly.  He is breathing much better.  He has been on testosterone replacement therapy and has a history of nocturnal hypoxemia.  He has been noted to have polycythemia and had a phlebotomy last month.  He is scheduled to see Dr. Agee in January with repeat scanning.    Past Medical History:   Diagnosis Date   • Adrenal disorder (HCC)    • Arrhythmia    • Breath shortness    • Cancer (HCC)     melanoma in 2007, original 1989 from back of neck  (chemo)   • Cataract    • Emphysema of lung (HCC)    • Heart burn    • Hypertension    • Indigestion    • Metastatic melanoma (HCC) 1990 / 2012    original neck lesion resected 1990 / recurrent lesion resected 2007 / third lesion resected 2012 /recurrence in 2016   • Thyroid disease        ROS:   Constitutional: Denies fevers, chills, night sweats, fatigue or weight loss  Eyes: Denies vision loss, pain, drainage, double vision  Ears, Nose, Throat: Denies earache, tinnitus, hoarseness  Cardiovascular: Denies chest pain, tightness, palpitations  Respiratory: See HPI  Sleep: He is on supplemental oxygen at night  GI: Denies abdominal pain, nausea, vomiting, diarrhea  : Denies frequent urination, hematuria, painful urination  Musculoskeletal: Denies back pain, painful joints, sore muscles  Neurological: Denies headaches, seizures  Skin: Denies rashes, color changes  Psychiatric: Denies depression or thoughts of suicide  Hematologic: Denies bleeding tendency or clotting tendency.  History of polycythemia  Allergic/Immunologic: Denies rhinitis, skin sensitivity    Social History     Social History   • Marital status:      Spouse name: N/A   • Number of children: N/A   • Years of education: N/A     Occupational History   • Not on file.     Social History Main Topics   • Smoking status: Current Every Day Smoker     Packs/day: 0.50     Years: 40.00     Types: Cigarettes   • Smokeless tobacco: Never Used   • Alcohol use Yes      Comment: 1 drink per month    • Drug use: No   • Sexual activity: Not on file     Other Topics Concern   • Not on file     Social History Narrative   • No narrative on file     Sulfa drugs  Current Outpatient Prescriptions on File Prior to Visit   Medication Sig Dispense Refill   • flecainide (TAMBOCOR) 100 MG Tab take 1 tablet by mouth twice a day 180 Tab 2   • CARTIA  MG CAPSULE SR 24 HR take 1 capsule by mouth once daily 90 Cap 3   • Fluticasone-Umeclidin-Vilant (TRELEGY ELLIPTA)  100-62.5-25 MCG/INH AEROSOL POWDER, BREATH ACTIVATED Inhale 1 Puff by mouth every day. 2 Each 0   • levothyroxine (SYNTHROID) 150 MCG Tab Take 1 Tab by mouth Every morning on an empty stomach. 90 Tab 3   • hydrocortisone (CORTEF) 10 MG Tab Take 1 Tab by mouth 3 times a day. (Patient taking differently: Take 7.5 mg by mouth 3 times a day. 20 mg  AM, 10 mg PM) 30 Tab 0   • felodipine (PLENDIL) 2.5 MG TABLET SR 24 HR Take 2.5 mg by mouth every day.     • aspirin 81 MG EC tablet Take 1 Tab by mouth every day. 100 Tab 3   • tamsulosin (FLOMAX) 0.4 MG capsule Take 0.4 mg by mouth ONE-HALF HOUR AFTER BREAKFAST.     • simvastatin (ZOCOR) 20 MG TABS Take 20 mg by mouth every morning.     • omeprazole (PRILOSEC) 20 MG CPDR Take 20 mg by mouth every day.     • Multiple Vitamin (MULTIVITAMIN PO) Take  by mouth every day.     • HYDROcodone-acetaminophen (NORCO) 7.5-325 MG per tablet Take 1-2 Tabs by mouth every 8 hours as needed.     • flecainide (TAMBOCOR) 100 MG Tab Take 100 mg by mouth 2 times a day.       No current facility-administered medications on file prior to visit.      Blood pressure 134/82, pulse 76, temperature 36.8 °C (98.2 °F), temperature source Oral, resp. rate 16, height 1.829 m (6'), weight 105.7 kg (233 lb), SpO2 96 %.  Family History   Problem Relation Age of Onset   • Heart Disease Mother    • Cancer Father    • Heart Disease Maternal Grandmother    • Heart Disease Maternal Grandfather    • Cancer Paternal Grandfather        Physical Exam:    HEENT: PERRLA, EOMI, no scleral icterus, no nasal or oral lesions  Neck: No thyromegaly, no adenopathy, no bruits  Mallampatti: Grade III  Lungs: Equal breath sounds, no wheezes or crackles  Heart: Regular rate and rhythm, no gallops or murmurs  Abdomen: Soft, benign, no organomegaly  Extremities: No clubbing, cyanosis, or edema  Neurologic: Cranial nerve, motor, and sensory exam are normal    1. Chronic obstructive pulmonary disease, unspecified COPD type (HCC)     2. SOB (shortness of breath)    3. Tobacco use    4. History of melanoma    5. Adrenal insufficiency (HCC)    6. Polycythemia    7. Hypoxemia        He is doing very well on Trelegy.  We will make no changes.  He will continue on supplemental oxygen at 2 L/min at night.  Once again I have strongly suggested that he discontinue smoking entirely.  He will continue to follow-up with Dr. Elias and Dr. Agee.

## 2018-12-28 NOTE — LETTER
Balaji Musa M.D.  [facility]   [address] [place] [address] [place]  [place] [address]  Phone: [phone] - Fax: [phone]           Encounter Date: 12/28/2018  Provider: Balaji Musa M.D.  Location of Care: [facility] PULMONARY MEDICINE      Patient:   Artem Joseph   MR Number: [id number]   YOB: 1950     PROGRESS NOTE:  Chief Complaint   Patient presents with   • Follow-Up     COPD       HPI:  The patient is a 68-year-old man who has a diagnosis of metastatic melanoma.  He also has a history consistent with COPD.  He has smoked a pack of cigarettes a day for 48 years.  He is still smoking about a half a pack of cigarettes per day.   He does have a chronic cough productive of clear sputum.  He has no history of hemoptysis.  He denies significant wheezing.  He was very short of breath during the summer because of poor air quality.  He previously worked as a  in [place].  His diagnosis and subsequent extensive treatment of his malignant melanoma as outlined in Dr. Agee's notes.  He is also been found to have adrenal insufficiency.  He is being followed by Dr. Wilson.  He previously had been on prednisone and tells me that when on prednisone he had no breathing problems.  He is now on hydrocortisone.  Pulmonary function testing today demonstrates an FEV1 of 2.42 L which is 72% of predicted.  His FEV1 FVC ratio is reduced at 52%.  He has 18% improvement after an inhaled bronchodilator.  His DLCO is 80% of predicted.  He is in no distress currently.  His CT scan demonstrates a 5 mm nodule in the lingula with some atelectasis at the bases.  He has no significant infiltrates.  He does have evidence of emphysema.  There is no evidence of any significant interstitial lung disease.  After his last visit she had a treatment with Trelegy.  He feels that it has helped significantly.  He is breathing much better.  He has been on  testosterone replacement therapy and has a history of nocturnal hypoxemia.  He has been noted to have polycythemia and had a phlebotomy last month.  He is scheduled to see Dr. Agee in January with repeat scanning.    Past Medical History:   Diagnosis Date   • Adrenal disorder (HCC)    • Arrhythmia    • Breath shortness    • Cancer (HCC)     melanoma in 2007, original 1989 from back of neck (chemo)   • Cataract    • Emphysema of lung (HCC)    • Heart burn    • Hypertension    • Indigestion    • Metastatic melanoma (HCC) 1990 / 2012    original neck lesion resected 1990 / recurrent lesion resected 2007 / third lesion resected 2012 /recurrence in 2016   • Thyroid disease        ROS:   Constitutional: Denies fevers, chills, night sweats, fatigue or weight loss  Eyes: Denies vision loss, pain, drainage, double vision  Ears, Nose, Throat: Denies earache, tinnitus, hoarseness  Cardiovascular: Denies chest pain, tightness, palpitations  Respiratory: See HPI  Sleep: He is on supplemental oxygen at night  GI: Denies abdominal pain, nausea, vomiting, diarrhea  : Denies frequent urination, hematuria, painful urination  Musculoskeletal: Denies back pain, painful joints, sore muscles  Neurological: Denies headaches, seizures  Skin: Denies rashes, color changes  Psychiatric: Denies depression or thoughts of suicide  Hematologic: Denies bleeding tendency or clotting tendency.  History of polycythemia  Allergic/Immunologic: Denies rhinitis, skin sensitivity    Social History     Social History   • Marital status:      Spouse name: N/A   • Number of children: N/A   • Years of education: N/A     Occupational History   • Not on file.     Social History Main Topics   • Smoking status: Current Every Day Smoker     Packs/day: 0.50     Years: 40.00     Types: Cigarettes   • Smokeless tobacco: Never Used   • Alcohol use Yes      Comment: 1 drink per month    • Drug use: No   • Sexual activity: Not on file     Other Topics Concern     • Not on file     Social History Narrative   • No narrative on file     Sulfa drugs  Current Outpatient Prescriptions on File Prior to Visit   Medication Sig Dispense Refill   • flecainide (TAMBOCOR) 100 MG Tab take 1 tablet by mouth twice a day 180 Tab 2   • CARTIA  MG CAPSULE SR 24 HR take 1 capsule by mouth once daily 90 Cap 3   • Fluticasone-Umeclidin-Vilant (TRELEGY ELLIPTA) 100-62.5-25 MCG/INH AEROSOL POWDER, BREATH ACTIVATED Inhale 1 Puff by mouth every day. 2 Each 0   • levothyroxine (SYNTHROID) 150 MCG Tab Take 1 Tab by mouth Every morning on an empty stomach. 90 Tab 3   • hydrocortisone (CORTEF) 10 MG Tab Take 1 Tab by mouth 3 times a day. (Patient taking differently: Take 7.5 mg by mouth 3 times a day. 20 mg  AM, 10 mg PM) 30 Tab 0   • felodipine (PLENDIL) 2.5 MG TABLET SR 24 HR Take 2.5 mg by mouth every day.     • aspirin 81 MG EC tablet Take 1 Tab by mouth every day. 100 Tab 3   • tamsulosin (FLOMAX) 0.4 MG capsule Take 0.4 mg by mouth ONE-HALF HOUR AFTER BREAKFAST.     • simvastatin (ZOCOR) 20 MG TABS Take 20 mg by mouth every morning.     • omeprazole (PRILOSEC) 20 MG CPDR Take 20 mg by mouth every day.     • Multiple Vitamin (MULTIVITAMIN PO) Take  by mouth every day.     • HYDROcodone-acetaminophen (NORCO) 7.5-325 MG per tablet Take 1-2 Tabs by mouth every 8 hours as needed.     • flecainide (TAMBOCOR) 100 MG Tab Take 100 mg by mouth 2 times a day.       No current facility-administered medications on file prior to visit.      Blood pressure 134/82, pulse 76, temperature 36.8 °C (98.2 °F), temperature source Oral, resp. rate 16, height 1.829 m (6'), weight 105.7 kg (233 lb), SpO2 96 %.  Family History   Problem Relation Age of Onset   • Heart Disease Mother    • Cancer Father    • Heart Disease Maternal Grandmother    • Heart Disease Maternal Grandfather    • Cancer Paternal Grandfather        Physical Exam:    HEENT: PERRLA, EOMI, no scleral icterus, no nasal or oral lesions  Neck: No  thyromegaly, no adenopathy, no bruits  Mallampatti: Grade III  Lungs: Equal breath sounds, no wheezes or crackles  Heart: Regular rate and rhythm, no gallops or murmurs  Abdomen: Soft, benign, no organomegaly  Extremities: No clubbing, cyanosis, or edema  Neurologic: Cranial nerve, motor, and sensory exam are normal    1. Chronic obstructive pulmonary disease, unspecified COPD type (HCC)    2. SOB (shortness of breath)    3. Tobacco use    4. History of melanoma    5. Adrenal insufficiency (HCC)    6. Polycythemia    7. Hypoxemia        He is doing very well on Trelegy.  We will make no changes.  He will continue on supplemental oxygen at 2 L/min at night.  Once again I have strongly suggested that he discontinue smoking entirely.  He will continue to follow-up with Dr. Elias and Dr. Agee.      Electronically signed by Balaji Musa M.D.  on 12/28/18    No Recipients

## 2019-01-14 ENCOUNTER — OFFICE VISIT (OUTPATIENT)
Dept: CARDIOLOGY | Facility: MEDICAL CENTER | Age: 69
End: 2019-01-14
Payer: MEDICARE

## 2019-01-14 VITALS
DIASTOLIC BLOOD PRESSURE: 76 MMHG | BODY MASS INDEX: 32.64 KG/M2 | HEIGHT: 72 IN | SYSTOLIC BLOOD PRESSURE: 124 MMHG | WEIGHT: 241 LBS | OXYGEN SATURATION: 94 % | HEART RATE: 76 BPM

## 2019-01-14 DIAGNOSIS — I48.91 ATRIAL FIBRILLATION WITH RAPID VENTRICULAR RESPONSE (HCC): ICD-10-CM

## 2019-01-14 DIAGNOSIS — I10 ESSENTIAL HYPERTENSION: ICD-10-CM

## 2019-01-14 DIAGNOSIS — E27.40 ADRENAL INSUFFICIENCY (HCC): ICD-10-CM

## 2019-01-14 DIAGNOSIS — D75.1 POLYCYTHEMIA: ICD-10-CM

## 2019-01-14 DIAGNOSIS — I20.0 UNSTABLE ANGINA PECTORIS (HCC): ICD-10-CM

## 2019-01-14 DIAGNOSIS — C43.9 METASTATIC MELANOMA (HCC): ICD-10-CM

## 2019-01-14 PROCEDURE — 99214 OFFICE O/P EST MOD 30 MIN: CPT | Performed by: INTERNAL MEDICINE

## 2019-01-14 ASSESSMENT — ENCOUNTER SYMPTOMS
MUSCULOSKELETAL NEGATIVE: 1
GASTROINTESTINAL NEGATIVE: 1
WEAKNESS: 0
HEMOPTYSIS: 0
DIZZINESS: 0
RESPIRATORY NEGATIVE: 1
CARDIOVASCULAR NEGATIVE: 1
FEVER: 0
EYES NEGATIVE: 1
SORE THROAT: 0
CHILLS: 0
CONSTITUTIONAL NEGATIVE: 1
WHEEZING: 0
CLAUDICATION: 0
BRUISES/BLEEDS EASILY: 0
ORTHOPNEA: 0
STRIDOR: 0
LOSS OF CONSCIOUSNESS: 0
SPUTUM PRODUCTION: 0
SHORTNESS OF BREATH: 0
NEUROLOGICAL NEGATIVE: 1
COUGH: 0
PND: 0
PALPITATIONS: 0

## 2019-01-14 NOTE — LETTER
[facility]   [address], [facility] [address]  [place] [address]  Phone: [phone]  Fax: [phone]              Artem Joseph  1950    Encounter Date: 1/14/2019    Boogie Ramirez M.D.          PROGRESS NOTE:  Chief Complaint   Patient presents with   • Atrial Fibrillation     FV       Subjective:   Artem Joseph is a 68 y.o. male who presents today as a follow-up for his paroxysmal atrial fibrillation hypertension and metastatic melanoma.  Since we last saw him his melanoma stable.  He is scheduled for an interval PET scan this month.  He is having a higher burden of atrial fibrillation.  His last CT scan did show some coronary calcification that appeared to be at least moderate.  He has a bifascicular block on his ECG.  He has been taking an extra dose of his flecainide to tolerate and suppress his atrial fibrillation.  He is currently is not taking oral anticoagulant.    Past Medical History:   Diagnosis Date   • Adrenal disorder (HCC)    • Arrhythmia    • Breath shortness    • Cancer (HCC)     melanoma in 2007, original 1989 from back of neck (chemo)   • Cataract    • Emphysema of lung (HCC)    • Heart burn    • Hypertension    • Indigestion    • Metastatic melanoma (HCC) 1990 / 2012    original neck lesion resected 1990 / recurrent lesion resected 2007 / third lesion resected 2012 /recurrence in 2016   • Thyroid disease      Past Surgical History:   Procedure Laterality Date   • CATARACT PHACO WITH IOL Left 11/6/2018    Procedure: CATARACT PHACO WITH IOL;  Surgeon: Shravan Espñaa M.D.;  Location: SURGERY SAME DAY [facility];  Service: Ophthalmology   • CATARACT PHACO WITH IOL Right 10/16/2018    Procedure: CATARACT PHACO WITH IOL;  Surgeon: Srhavan España M.D.;  Location: SURGERY SAME DAY [facility];  Service: Ophthalmology   • COLONOSCOPY - ENDO  10/9/2016    Procedure: COLONOSCOPY - ENDO;  Surgeon: William Mitchell M.D.;  Location: SURGERY  [facility];  Service:    • RECOVERY  7/26/2016    Procedure: CT-CT Guided abdominal mass-;  Surgeon: Recoveryonly Surgery;  Location: SURGERY PRE-POST PROC UNIT [facility];  Service:    • MASS EXCISION GENERAL  3/9/2012    Performed by MARLEN KEEN at SURGERY SAME DAY [facility] ORS   • OTHER      lymph nodes back of neck     Family History   Problem Relation Age of Onset   • Heart Disease Mother    • Cancer Father    • Heart Disease Maternal Grandmother    • Heart Disease Maternal Grandfather    • Cancer Paternal Grandfather      Social History     Social History   • Marital status:      Spouse name: N/A   • Number of children: N/A   • Years of education: N/A     Occupational History   • Not on file.     Social History Main Topics   • Smoking status: Current Every Day Smoker     Packs/day: 0.50     Years: 40.00     Types: Cigarettes   • Smokeless tobacco: Never Used   • Alcohol use Yes      Comment: 1 drink per month    • Drug use: No   • Sexual activity: Not on file     Other Topics Concern   • Not on file     Social History Narrative   • No narrative on file     Allergies   Allergen Reactions   • Sulfa Drugs      Severe muscle pains     Outpatient Encounter Prescriptions as of 1/14/2019   Medication Sig Dispense Refill   • testosterone cypionate (DEPO-TESTOSTERONE) 200 MG/ML Solution injection   0   • VALSARTAN-HYDROCHLOROTHIAZIDE PO Take  by mouth.     • CARTIA  MG CAPSULE SR 24 HR take 1 capsule by mouth once daily 90 Cap 3   • Fluticasone-Umeclidin-Vilant (TRELEGY ELLIPTA) 100-62.5-25 MCG/INH AEROSOL POWDER, BREATH ACTIVATED Inhale 1 Puff by mouth every day. 2 Each 0   • levothyroxine (SYNTHROID) 150 MCG Tab Take 1 Tab by mouth Every morning on an empty stomach. 90 Tab 3   • hydrocortisone (CORTEF) 10 MG Tab Take 1 Tab by mouth 3 times a day. (Patient taking differently: Take 7.5 mg by mouth 3 times a day. 20 mg  AM, 10 mg PM) 30 Tab 0   • felodipine (PLENDIL) 2.5 MG TABLET SR 24 HR Take  "2.5 mg by mouth every day.     • flecainide (TAMBOCOR) 100 MG Tab Take 100 mg by mouth 2 times a day.     • aspirin 81 MG EC tablet Take 1 Tab by mouth every day. 100 Tab 3   • tamsulosin (FLOMAX) 0.4 MG capsule Take 0.4 mg by mouth ONE-HALF HOUR AFTER BREAKFAST.     • simvastatin (ZOCOR) 20 MG TABS Take 20 mg by mouth every morning.     • omeprazole (PRILOSEC) 20 MG CPDR Take 20 mg by mouth every day.     • Multiple Vitamin (MULTIVITAMIN PO) Take  by mouth every day.     • hydrocortisone (CORTEF) 10 MG Tab take 2 tablets by mouth every morning and 1 tablet every evening 90 Tab 2   • FELODIPINE ER PO Take  by mouth.     • HYDROCODONE-ACETAMINOPHEN PO Take  by mouth.     • MULTIPLE VITAMIN PO Take  by mouth.     • OMEPRAZOLE PO Take  by mouth.     • ondansetron (ZOFRAN ODT) 4 MG TABLET DISPERSIBLE   0   • B-D 3CC LUER-BO SYR 23GX1\" 23G X 1\" 3 ML Misc   0   • simvastatin (ZOCOR) 10 MG Tab by Does not apply route.     • [DISCONTINUED] flecainide (TAMBOCOR) 100 MG Tab take 1 tablet by mouth twice a day 180 Tab 2   • HYDROcodone-acetaminophen (NORCO) 7.5-325 MG per tablet Take 1-2 Tabs by mouth every 8 hours as needed.       No facility-administered encounter medications on file as of 1/14/2019.      Review of Systems   Constitutional: Negative.  Negative for chills, fever and malaise/fatigue.   HENT: Negative.  Negative for sore throat.    Eyes: Negative.    Respiratory: Negative.  Negative for cough, hemoptysis, sputum production, shortness of breath, wheezing and stridor.    Cardiovascular: Negative.  Negative for chest pain, palpitations, orthopnea, claudication, leg swelling and PND.   Gastrointestinal: Negative.    Genitourinary: Negative.    Musculoskeletal: Negative.    Skin: Negative.    Neurological: Negative.  Negative for dizziness, loss of consciousness and weakness.   Endo/Heme/Allergies: Negative.  Does not bruise/bleed easily.   All other systems reviewed and are negative.       Objective:   /76 " (BP Location: Left arm, Patient Position: Sitting, BP Cuff Size: Adult)   Pulse 76   Ht 1.829 m (6')   Wt 109.3 kg (241 lb)   SpO2 94%   BMI 32.69 kg/m²      Physical Exam   Constitutional: He appears well-developed and well-nourished. No distress.   HENT:   Head: Normocephalic and atraumatic.   Right Ear: External ear normal.   Left Ear: External ear normal.   Nose: Nose normal.   Mouth/Throat: No oropharyngeal exudate.   Eyes: Pupils are equal, round, and reactive to light. Conjunctivae and EOM are normal. Right eye exhibits no discharge. Left eye exhibits no discharge. No scleral icterus.   Neck: Neck supple. No JVD present.   Cardiovascular: Normal rate, regular rhythm and intact distal pulses.  Exam reveals no gallop and no friction rub.    No murmur heard.  Pulmonary/Chest: Effort normal. No stridor. No respiratory distress. He has no wheezes. He has no rales. He exhibits no tenderness.   Abdominal: Soft. He exhibits no distension. There is no guarding.   Musculoskeletal: Normal range of motion. He exhibits no edema, tenderness or deformity.   Neurological: He is alert. He has normal reflexes. He displays normal reflexes. No cranial nerve deficit. He exhibits normal muscle tone. Coordination normal.   Skin: Skin is warm and dry. No rash noted. He is not diaphoretic. No erythema. No pallor.   Psychiatric: He has a normal mood and affect. His behavior is normal. Judgment and thought content normal.   Nursing note and vitals reviewed.      Assessment:     1. Adrenal insufficiency (HCC)     2. Atrial fibrillation with rapid ventricular response (HCC)  [facility] / Event Monitor   3. Unstable angina pectoris (HCC)     4. Essential hypertension     5. Metastatic melanoma (HCC)     6. Polycythemia         Medical Decision Making:  Today's Assessment / Status / Plan:     68-year-old male with metastatic melanoma as well as paroxysmal atrial fibrillation with coronary calcification bifascicular block and no  anticoagulation given the above.  I discussed with him the risks and benefits of oral anticoagulation.  He is melanoma does put him at risk somewhat for spontaneous hemorrhage.  However is currently not active.  I think the best course of action would be to get the follow-up PET/CT to see how active his melanoma is.  I did offer him a change in antiarrhythmic medications given his breakthrough on his flecainide.  Dronedarone is an alternative but is probably less efficacious on his flecainide.  I think first and foremost we should get a Holter monitor or event recorder to look at his burden of atrial fibrillation and in fact prove that that is what it is.  If it is then we can consider a change in antiarrhythmic medications.  We will defer decision on his oral and coagulation until he see him back.    Thank for you allowing me to take part in your patient's care, please call should you have any questions or would like to discuss this patient.      Yon Madison M.D.  [address] [place] [address]  VIA Facsimile: [phone]

## 2019-01-14 NOTE — PROGRESS NOTES
Chief Complaint   Patient presents with   • Atrial Fibrillation     FV       Subjective:   Artem Joseph is a 68 y.o. male who presents today as a follow-up for his paroxysmal atrial fibrillation hypertension and metastatic melanoma.  Since we last saw him his melanoma stable.  He is scheduled for an interval PET scan this month.  He is having a higher burden of atrial fibrillation.  His last CT scan did show some coronary calcification that appeared to be at least moderate.  He has a bifascicular block on his ECG.  He has been taking an extra dose of his flecainide to tolerate and suppress his atrial fibrillation.  He is currently is not taking oral anticoagulant.    Past Medical History:   Diagnosis Date   • Adrenal disorder (HCC)    • Arrhythmia    • Breath shortness    • Cancer (HCC)     melanoma in 2007, original 1989 from back of neck (chemo)   • Cataract    • Emphysema of lung (HCC)    • Heart burn    • Hypertension    • Indigestion    • Metastatic melanoma (HCC) 1990 / 2012    original neck lesion resected 1990 / recurrent lesion resected 2007 / third lesion resected 2012 /recurrence in 2016   • Thyroid disease      Past Surgical History:   Procedure Laterality Date   • CATARACT PHACO WITH IOL Left 11/6/2018    Procedure: CATARACT PHACO WITH IOL;  Surgeon: Shravan España M.D.;  Location: SURGERY SAME DAY [facility];  Service: Ophthalmology   • CATARACT PHACO WITH IOL Right 10/16/2018    Procedure: CATARACT PHACO WITH IOL;  Surgeon: Shravan España M.D.;  Location: SURGERY SAME DAY [facility];  Service: Ophthalmology   • COLONOSCOPY - ENDO  10/9/2016    Procedure: COLONOSCOPY - ENDO;  Surgeon: William Mitchell M.D.;  Location: SURGERY [facility];  Service:    • RECOVERY  7/26/2016    Procedure: CT-CT Guided abdominal mass-;  Surgeon: [facility];  Location: SURGERY PRE-POST PROC UNIT [facility];  Service:    • MASS EXCISION GENERAL  3/9/2012    Performed by MARLEN KEEN at SURGERY  SAME DAY [facility]   • OTHER      lymph nodes back of neck     Family History   Problem Relation Age of Onset   • Heart Disease Mother    • Cancer Father    • Heart Disease Maternal Grandmother    • Heart Disease Maternal Grandfather    • Cancer Paternal Grandfather      Social History     Social History   • Marital status:      Spouse name: N/A   • Number of children: N/A   • Years of education: N/A     Occupational History   • Not on file.     Social History Main Topics   • Smoking status: Current Every Day Smoker     Packs/day: 0.50     Years: 40.00     Types: Cigarettes   • Smokeless tobacco: Never Used   • Alcohol use Yes      Comment: 1 drink per month    • Drug use: No   • Sexual activity: Not on file     Other Topics Concern   • Not on file     Social History Narrative   • No narrative on file     Allergies   Allergen Reactions   • Sulfa Drugs      Severe muscle pains     Outpatient Encounter Prescriptions as of 1/14/2019   Medication Sig Dispense Refill   • testosterone cypionate (DEPO-TESTOSTERONE) 200 MG/ML Solution injection   0   • VALSARTAN-HYDROCHLOROTHIAZIDE PO Take  by mouth.     • CARTIA  MG CAPSULE SR 24 HR take 1 capsule by mouth once daily 90 Cap 3   • Fluticasone-Umeclidin-Vilant (TRELEGY ELLIPTA) 100-62.5-25 MCG/INH AEROSOL POWDER, BREATH ACTIVATED Inhale 1 Puff by mouth every day. 2 Each 0   • levothyroxine (SYNTHROID) 150 MCG Tab Take 1 Tab by mouth Every morning on an empty stomach. 90 Tab 3   • hydrocortisone (CORTEF) 10 MG Tab Take 1 Tab by mouth 3 times a day. (Patient taking differently: Take 7.5 mg by mouth 3 times a day. 20 mg  AM, 10 mg PM) 30 Tab 0   • felodipine (PLENDIL) 2.5 MG TABLET SR 24 HR Take 2.5 mg by mouth every day.     • flecainide (TAMBOCOR) 100 MG Tab Take 100 mg by mouth 2 times a day.     • aspirin 81 MG EC tablet Take 1 Tab by mouth every day. 100 Tab 3   • tamsulosin (FLOMAX) 0.4 MG capsule Take 0.4 mg by mouth ONE-HALF HOUR AFTER BREAKFAST.     •  "simvastatin (ZOCOR) 20 MG TABS Take 20 mg by mouth every morning.     • omeprazole (PRILOSEC) 20 MG CPDR Take 20 mg by mouth every day.     • Multiple Vitamin (MULTIVITAMIN PO) Take  by mouth every day.     • hydrocortisone (CORTEF) 10 MG Tab take 2 tablets by mouth every morning and 1 tablet every evening 90 Tab 2   • FELODIPINE ER PO Take  by mouth.     • HYDROCODONE-ACETAMINOPHEN PO Take  by mouth.     • MULTIPLE VITAMIN PO Take  by mouth.     • OMEPRAZOLE PO Take  by mouth.     • ondansetron (ZOFRAN ODT) 4 MG TABLET DISPERSIBLE   0   • B-D 3CC LUER-BO SYR 23GX1\" 23G X 1\" 3 ML Misc   0   • simvastatin (ZOCOR) 10 MG Tab by Does not apply route.     • [DISCONTINUED] flecainide (TAMBOCOR) 100 MG Tab take 1 tablet by mouth twice a day 180 Tab 2   • HYDROcodone-acetaminophen (NORCO) 7.5-325 MG per tablet Take 1-2 Tabs by mouth every 8 hours as needed.       No facility-administered encounter medications on file as of 1/14/2019.      Review of Systems   Constitutional: Negative.  Negative for chills, fever and malaise/fatigue.   HENT: Negative.  Negative for sore throat.    Eyes: Negative.    Respiratory: Negative.  Negative for cough, hemoptysis, sputum production, shortness of breath, wheezing and stridor.    Cardiovascular: Negative.  Negative for chest pain, palpitations, orthopnea, claudication, leg swelling and PND.   Gastrointestinal: Negative.    Genitourinary: Negative.    Musculoskeletal: Negative.    Skin: Negative.    Neurological: Negative.  Negative for dizziness, loss of consciousness and weakness.   Endo/Heme/Allergies: Negative.  Does not bruise/bleed easily.   All other systems reviewed and are negative.       Objective:   /76 (BP Location: Left arm, Patient Position: Sitting, BP Cuff Size: Adult)   Pulse 76   Ht 1.829 m (6')   Wt 109.3 kg (241 lb)   SpO2 94%   BMI 32.69 kg/m²     Physical Exam   Constitutional: He appears well-developed and well-nourished. No distress.   HENT:   Head: " Normocephalic and atraumatic.   Right Ear: External ear normal.   Left Ear: External ear normal.   Nose: Nose normal.   Mouth/Throat: No oropharyngeal exudate.   Eyes: Pupils are equal, round, and reactive to light. Conjunctivae and EOM are normal. Right eye exhibits no discharge. Left eye exhibits no discharge. No scleral icterus.   Neck: Neck supple. No JVD present.   Cardiovascular: Normal rate, regular rhythm and intact distal pulses.  Exam reveals no gallop and no friction rub.    No murmur heard.  Pulmonary/Chest: Effort normal. No stridor. No respiratory distress. He has no wheezes. He has no rales. He exhibits no tenderness.   Abdominal: Soft. He exhibits no distension. There is no guarding.   Musculoskeletal: Normal range of motion. He exhibits no edema, tenderness or deformity.   Neurological: He is alert. He has normal reflexes. He displays normal reflexes. No cranial nerve deficit. He exhibits normal muscle tone. Coordination normal.   Skin: Skin is warm and dry. No rash noted. He is not diaphoretic. No erythema. No pallor.   Psychiatric: He has a normal mood and affect. His behavior is normal. Judgment and thought content normal.   Nursing note and vitals reviewed.      Assessment:     1. Adrenal insufficiency (HCC)     2. Atrial fibrillation with rapid ventricular response (HCC)  [facility] HM / Event Monitor   3. Unstable angina pectoris (HCC)     4. Essential hypertension     5. Metastatic melanoma (HCC)     6. Polycythemia         Medical Decision Making:  Today's Assessment / Status / Plan:     68-year-old male with metastatic melanoma as well as paroxysmal atrial fibrillation with coronary calcification bifascicular block and no anticoagulation given the above.  I discussed with him the risks and benefits of oral anticoagulation.  He is melanoma does put him at risk somewhat for spontaneous hemorrhage.  However is currently not active.  I think the best course of action would be to get the follow-up  PET/CT to see how active his melanoma is.  I did offer him a change in antiarrhythmic medications given his breakthrough on his flecainide.  Dronedarone is an alternative but is probably less efficacious on his flecainide.  I think first and foremost we should get a Holter monitor or event recorder to look at his burden of atrial fibrillation and in fact prove that that is what it is.  If it is then we can consider a change in antiarrhythmic medications.  We will defer decision on his oral and coagulation until he see him back.    Thank for you allowing me to take part in your patient's care, please call should you have any questions or would like to discuss this patient.

## 2019-01-25 ENCOUNTER — TELEPHONE (OUTPATIENT)
Dept: CARDIOLOGY | Facility: MEDICAL CENTER | Age: 69
End: 2019-01-25

## 2019-01-25 ENCOUNTER — NON-PROVIDER VISIT (OUTPATIENT)
Dept: CARDIOLOGY | Facility: MEDICAL CENTER | Age: 69
End: 2019-01-25
Payer: MEDICARE

## 2019-01-25 DIAGNOSIS — I49.3 PVC (PREMATURE VENTRICULAR CONTRACTION): ICD-10-CM

## 2019-02-13 DIAGNOSIS — I48.91 ATRIAL FIBRILLATION WITH RAPID VENTRICULAR RESPONSE (HCC): ICD-10-CM

## 2019-02-19 PROCEDURE — 0298T PR EXT ECG > 48HR TO 21 DAY REVIEW AND INTERPRETATN: CPT | Performed by: INTERNAL MEDICINE

## 2019-02-19 PROCEDURE — 0296T PR EXT ECG > 48HR TO 21 DAY RCRD W/CONECT INTL RCRD: CPT | Performed by: INTERNAL MEDICINE

## 2019-02-28 RX ORDER — SYRINGE WITH NEEDLE, 1 ML 25GX5/8"
SYRINGE, EMPTY DISPOSABLE MISCELLANEOUS
Qty: 6 EACH | Refills: 2 | Status: SHIPPED | OUTPATIENT
Start: 2019-02-28 | End: 2019-09-26 | Stop reason: SDUPTHER

## 2019-02-28 NOTE — TELEPHONE ENCOUNTER
"Was the patient seen in the last year in this department? Yes    Does patient have an active prescription for medications requested? No     Received Request Via: Pharmacy     B-D 3CC LUER-BO SYR 23GX1\" 23G X 1\" 3 ML Misc  use as directed with TESTOSTERONE    Saw Burton 3 months ago  "

## 2019-03-11 ENCOUNTER — HOSPITAL ENCOUNTER (OUTPATIENT)
Dept: RADIOLOGY | Facility: MEDICAL CENTER | Age: 69
End: 2019-03-11
Attending: INTERNAL MEDICINE
Payer: MEDICARE

## 2019-03-11 DIAGNOSIS — C43.4 MALIGNANT MELANOMA OF SKIN OF SCALP AND NECK (HCC): ICD-10-CM

## 2019-03-11 PROCEDURE — 71260 CT THORAX DX C+: CPT

## 2019-03-11 PROCEDURE — 700117 HCHG RX CONTRAST REV CODE 255: Performed by: INTERNAL MEDICINE

## 2019-03-11 RX ADMIN — IOHEXOL 50 ML: 240 INJECTION, SOLUTION INTRATHECAL; INTRAVASCULAR; INTRAVENOUS; ORAL at 12:59

## 2019-03-11 RX ADMIN — IOHEXOL 100 ML: 350 INJECTION, SOLUTION INTRAVENOUS at 12:59

## 2019-03-18 RX ORDER — HYDROCORTISONE 10 MG/1
TABLET ORAL
Qty: 90 TAB | Refills: 2 | Status: SHIPPED | OUTPATIENT
Start: 2019-03-18 | End: 2019-06-10 | Stop reason: SDUPTHER

## 2019-03-18 NOTE — TELEPHONE ENCOUNTER
Was the patient seen in the last year in this department? Yes  LOV 11/28/18 [place]  Next appt 5/21/19 [place]    Does patient have an active prescription for medications requested? Yes    Received Request Via: Pharmacy

## 2019-04-04 ENCOUNTER — HOSPITAL ENCOUNTER (OUTPATIENT)
Dept: RADIOLOGY | Facility: MEDICAL CENTER | Age: 69
End: 2019-04-04
Attending: INTERNAL MEDICINE
Payer: MEDICARE

## 2019-04-04 DIAGNOSIS — C43.4 MALIGNANT MELANOMA OF SKIN OF SCALP AND NECK (HCC): ICD-10-CM

## 2019-04-04 PROCEDURE — A9552 F18 FDG: HCPCS

## 2019-04-16 DIAGNOSIS — E29.1 HYPOGONADISM IN MALE: ICD-10-CM

## 2019-04-22 RX ORDER — TESTOSTERONE CYPIONATE 200 MG/ML
INJECTION, SOLUTION INTRAMUSCULAR
Qty: 3 ML | Refills: 4 | Status: SHIPPED | OUTPATIENT
Start: 2019-04-22 | End: 2019-06-22

## 2019-04-22 NOTE — TELEPHONE ENCOUNTER
Was the patient seen in the last year in this department? Yes 11/28/19    Does patient have an active prescription for medications requested? No     Received Request Via: Pharmacy     testosterone cypionate (DEPO-TESTOSTERONE) 200 MG/ML Solution injection  inject 0.5 milliliters intramuscularly every 10 days       Adequate: hears normal conversation without difficulty

## 2019-05-21 ENCOUNTER — OFFICE VISIT (OUTPATIENT)
Dept: CARDIOLOGY | Facility: MEDICAL CENTER | Age: 69
End: 2019-05-21
Payer: MEDICARE

## 2019-05-21 VITALS
BODY MASS INDEX: 32.51 KG/M2 | HEART RATE: 78 BPM | SYSTOLIC BLOOD PRESSURE: 116 MMHG | WEIGHT: 240 LBS | DIASTOLIC BLOOD PRESSURE: 80 MMHG | OXYGEN SATURATION: 94 % | HEIGHT: 72 IN

## 2019-05-21 DIAGNOSIS — E87.6 HYPOKALEMIA: ICD-10-CM

## 2019-05-21 DIAGNOSIS — I20.0 UNSTABLE ANGINA PECTORIS (HCC): ICD-10-CM

## 2019-05-21 DIAGNOSIS — Z72.0 TOBACCO ABUSE: ICD-10-CM

## 2019-05-21 DIAGNOSIS — D75.1 POLYCYTHEMIA: ICD-10-CM

## 2019-05-21 DIAGNOSIS — C43.9 METASTATIC MELANOMA (HCC): ICD-10-CM

## 2019-05-21 DIAGNOSIS — E29.1 PRIMARY TESTICULAR HYPOGONADISM: ICD-10-CM

## 2019-05-21 DIAGNOSIS — E27.40 ADRENAL INSUFFICIENCY (HCC): ICD-10-CM

## 2019-05-21 DIAGNOSIS — I48.91 ATRIAL FIBRILLATION WITH RAPID VENTRICULAR RESPONSE (HCC): ICD-10-CM

## 2019-05-21 DIAGNOSIS — I10 ESSENTIAL HYPERTENSION: ICD-10-CM

## 2019-05-21 PROCEDURE — 99214 OFFICE O/P EST MOD 30 MIN: CPT | Performed by: INTERNAL MEDICINE

## 2019-05-21 RX ORDER — FELODIPINE 2.5 MG/1
2.5 TABLET, EXTENDED RELEASE ORAL DAILY
Qty: 90 TAB | Refills: 3 | Status: SHIPPED | OUTPATIENT
Start: 2019-05-21 | End: 2021-06-15

## 2019-05-21 RX ORDER — FLECAINIDE ACETATE 100 MG/1
100 TABLET ORAL 2 TIMES DAILY
Qty: 180 TAB | Refills: 3 | Status: SHIPPED | OUTPATIENT
Start: 2019-05-21 | End: 2019-11-20 | Stop reason: SDUPTHER

## 2019-05-21 RX ORDER — DILTIAZEM HYDROCHLORIDE 120 MG/1
120 CAPSULE, COATED, EXTENDED RELEASE ORAL DAILY
Qty: 90 CAP | Refills: 3 | Status: SHIPPED | OUTPATIENT
Start: 2019-05-21 | End: 2020-07-22

## 2019-05-21 ASSESSMENT — ENCOUNTER SYMPTOMS
COUGH: 0
PND: 0
STRIDOR: 0
CONSTITUTIONAL NEGATIVE: 1
SORE THROAT: 0
CARDIOVASCULAR NEGATIVE: 1
SPUTUM PRODUCTION: 0
CHILLS: 0
HEMOPTYSIS: 0
ORTHOPNEA: 0
WEAKNESS: 0
DIZZINESS: 0
FEVER: 0
NEUROLOGICAL NEGATIVE: 1
SHORTNESS OF BREATH: 0
LOSS OF CONSCIOUSNESS: 0
WHEEZING: 0
CLAUDICATION: 0
BRUISES/BLEEDS EASILY: 0
EYES NEGATIVE: 1
GASTROINTESTINAL NEGATIVE: 1
RESPIRATORY NEGATIVE: 1
MUSCULOSKELETAL NEGATIVE: 1
PALPITATIONS: 0

## 2019-05-21 NOTE — ASSESSMENT & PLAN NOTE
I spent 5 minutes discussing the need for smoking/tobacco cessation. We discussed measures for quitting including replacements such as nicotine gum/nicotine patch.

## 2019-05-21 NOTE — PROGRESS NOTES
Chief Complaint   Patient presents with   • Atrial Fibrillation     F/V: 4 MO       Subjective:   Artem Joseph is a 69 y.o. male who presents today actually as a follow-up for his hypertension hyperlipidemia and paroxysmal atrial fibrillation.  Since we last saw him he had a event recorder showing only 7 nonsustained runs medic.  He said no further palpitations.  He had interval PET CT scan showing complete resolution of his melanoma.  His blood pressure is now at goal.  He continues to smoke about 5 cigarettes/day.  His blood pressures otherwise at goal.    Past Medical History:   Diagnosis Date   • Adrenal disorder (HCC)    • Arrhythmia    • Breath shortness    • Cancer (HCC)     melanoma in 2007, original 1989 from back of neck (chemo)   • Cataract    • Emphysema of lung (HCC)    • Heart burn    • Hypertension    • Indigestion    • Metastatic melanoma (HCC) 1990 / 2012    original neck lesion resected 1990 / recurrent lesion resected 2007 / third lesion resected 2012 /recurrence in 2016   • Thyroid disease      Past Surgical History:   Procedure Laterality Date   • CATARACT PHACO WITH IOL Left 11/6/2018    Procedure: CATARACT PHACO WITH IOL;  Surgeon: Shravan España M.D.;  Location: SURGERY SAME DAY [facility];  Service: Ophthalmology   • CATARACT PHACO WITH IOL Right 10/16/2018    Procedure: CATARACT PHACO WITH IOL;  Surgeon: Shravan España M.D.;  Location: SURGERY SAME DAY [facility];  Service: Ophthalmology   • COLONOSCOPY - ENDO  10/9/2016    Procedure: COLONOSCOPY - ENDO;  Surgeon: William Mitchell M.D.;  Location: SURGERY [place] [facility];  Service:    • RECOVERY  7/26/2016    Procedure: CT-CT Guided abdominal mass-;  Surgeon: [facility];  [place] [facility];  Service:    • MASS EXCISION GENERAL  3/9/2012    Performed by MARLEN KEEN at SURGERY SAME DAY [facility]   • OTHER      lymph nodes back of neck     Family History   Problem Relation Age of Onset  "  • Heart Disease Mother    • Cancer Father    • Heart Disease Maternal Grandmother    • Heart Disease Maternal Grandfather    • Cancer Paternal Grandfather      Social History     Social History   • Marital status:      Spouse name: N/A   • Number of children: N/A   • Years of education: N/A     Occupational History   • Not on file.     Social History Main Topics   • Smoking status: Current Every Day Smoker     Packs/day: 0.50     Years: 40.00     Types: Cigarettes   • Smokeless tobacco: Never Used   • Alcohol use Yes      Comment: 1 drink per month    • Drug use: No   • Sexual activity: Not on file     Other Topics Concern   • Not on file     Social History Narrative   • No narrative on file     Allergies   Allergen Reactions   • Sulfa Drugs      Severe muscle pains     Outpatient Encounter Prescriptions as of 5/21/2019   Medication Sig Dispense Refill   • flecainide (TAMBOCOR) 100 MG Tab Take 1 Tab by mouth 2 times a day. 180 Tab 3   • DILTIAZem CD (CARTIA XT) 120 MG CAPSULE SR 24 HR Take 1 Cap by mouth every day. 90 Cap 3   • felodipine (PLENDIL) 2.5 MG TABLET SR 24 HR Take 1 Tab by mouth every day. 90 Tab 3   • testosterone cypionate (DEPO-TESTOSTERONE) 200 MG/ML Solution injection inject 0.5 milliliters intramuscularly every 10 days 3 mL 4   • hydrocortisone (CORTEF) 10 MG Tab take 2 tablets by mouth every morning and 1 tablet every evening 90 Tab 2   • B-D 3CC LUER-BO SYR 23GX1\" 23G X 1\" 3 ML Misc use as directed with TESTOSTERONE 6 Each 2   • Fluticasone-Umeclidin-Vilant (TRELEGY ELLIPTA) 100-62.5-25 MCG/INH AEROSOL POWDER, BREATH ACTIVATED Inhale 1 Puff by mouth every day. 2 Each 0   • levothyroxine (SYNTHROID) 150 MCG Tab Take 1 Tab by mouth Every morning on an empty stomach. 90 Tab 3   • aspirin 81 MG EC tablet Take 1 Tab by mouth every day. 100 Tab 3   • tamsulosin (FLOMAX) 0.4 MG capsule Take 0.4 mg by mouth ONE-HALF HOUR AFTER BREAKFAST.     • simvastatin (ZOCOR) 20 MG TABS Take 20 mg by mouth " every morning.     • omeprazole (PRILOSEC) 20 MG CPDR Take 20 mg by mouth every day.     • Multiple Vitamin (MULTIVITAMIN PO) Take  by mouth every day.     • ondansetron (ZOFRAN ODT) 4 MG TABLET DISPERSIBLE Take 4 mg by mouth every 6 hours as needed.  0   • [DISCONTINUED] FELODIPINE ER PO Take  by mouth.     • [DISCONTINUED] HYDROCODONE-ACETAMINOPHEN PO Take  by mouth.     • [DISCONTINUED] MULTIPLE VITAMIN PO Take  by mouth.     • [DISCONTINUED] OMEPRAZOLE PO Take  by mouth.     • [DISCONTINUED] VALSARTAN-HYDROCHLOROTHIAZIDE PO Take  by mouth.     • [DISCONTINUED] simvastatin (ZOCOR) 10 MG Tab by Does not apply route.     • [DISCONTINUED] CARTIA  MG CAPSULE SR 24 HR take 1 capsule by mouth once daily 90 Cap 3   • HYDROcodone-acetaminophen (NORCO) 7.5-325 MG per tablet Take 1-2 Tabs by mouth every 8 hours as needed.     • [DISCONTINUED] hydrocortisone (CORTEF) 10 MG Tab Take 1 Tab by mouth 3 times a day. (Patient taking differently: Take 7.5 mg by mouth 3 times a day. 20 mg  AM, 10 mg PM) 30 Tab 0   • [DISCONTINUED] felodipine (PLENDIL) 2.5 MG TABLET SR 24 HR Take 2.5 mg by mouth every day.     • [DISCONTINUED] flecainide (TAMBOCOR) 100 MG Tab Take 100 mg by mouth 2 times a day.       No facility-administered encounter medications on file as of 5/21/2019.      Review of Systems   Constitutional: Negative.  Negative for chills, fever and malaise/fatigue.   HENT: Negative.  Negative for sore throat.    Eyes: Negative.    Respiratory: Negative.  Negative for cough, hemoptysis, sputum production, shortness of breath, wheezing and stridor.    Cardiovascular: Negative.  Negative for chest pain, palpitations, orthopnea, claudication, leg swelling and PND.   Gastrointestinal: Negative.    Genitourinary: Negative.    Musculoskeletal: Negative.    Skin: Negative.    Neurological: Negative.  Negative for dizziness, loss of consciousness and weakness.   Endo/Heme/Allergies: Negative.  Does not bruise/bleed easily.   All  other systems reviewed and are negative.       Objective:   /80 (BP Location: Left arm, Patient Position: Sitting, BP Cuff Size: Adult)   Pulse 78   Ht 1.829 m (6')   Wt 108.9 kg (240 lb)   SpO2 94%   BMI 32.55 kg/m²     Physical Exam   Constitutional: He appears well-developed and well-nourished. No distress.   HENT:   Head: Normocephalic and atraumatic.   Right Ear: External ear normal.   Left Ear: External ear normal.   Nose: Nose normal.   Mouth/Throat: No oropharyngeal exudate.   Eyes: Pupils are equal, round, and reactive to light. Conjunctivae and EOM are normal. Right eye exhibits no discharge. Left eye exhibits no discharge. No scleral icterus.   Neck: Neck supple. No JVD present.   Cardiovascular: Normal rate, regular rhythm and intact distal pulses.  Exam reveals no gallop and no friction rub.    No murmur heard.  Pulmonary/Chest: Effort normal. No stridor. No respiratory distress. He has no wheezes. He has no rales. He exhibits no tenderness.   Abdominal: Soft. He exhibits no distension. There is no guarding.   Musculoskeletal: Normal range of motion. He exhibits no edema, tenderness or deformity.   Neurological: He is alert. He has normal reflexes. He displays normal reflexes. No cranial nerve deficit. He exhibits normal muscle tone. Coordination normal.   Skin: Skin is warm and dry. No rash noted. He is not diaphoretic. No erythema. No pallor.   Psychiatric: He has a normal mood and affect. His behavior is normal. Judgment and thought content normal.   Nursing note and vitals reviewed.      Assessment:     1. Unstable angina pectoris (HCC)     2. Atrial fibrillation with rapid ventricular response (HCC)  flecainide (TAMBOCOR) 100 MG Tab    DILTIAZem CD (CARTIA XT) 120 MG CAPSULE SR 24 HR   3. Adrenal insufficiency (HCC)     4. Essential hypertension  DILTIAZem CD (CARTIA XT) 120 MG CAPSULE SR 24 HR    felodipine (PLENDIL) 2.5 MG TABLET SR 24 HR   5. Metastatic melanoma (HCC)     6.  Polycythemia     7. Primary testicular hypogonadism     8. Tobacco abuse     9. Hypokalemia         Medical Decision Making:  Today's Assessment / Status / Plan:   Atrial fibrillation with rapid ventricular response (CMS-HCC)  Continue flecainide 100 twice daily  Continue Cartia 120 SR daily    Chest pain due to myocardial ischemia (CMS-HCC)  No recurrence  Clinical follow-up    Hypertension  At goal  Continue felodipine 2.5 daily    Hypokalemia  Resolved    Metastatic melanoma (CMS-HCC)  In remission  Last PET scan showed no evidence FDG uptake

## 2019-05-21 NOTE — LETTER
[facility] [facility]   [address] [place] [address]  YUM[place] Melo [address]  Phone: [phone]  Fax: [phone]              Artem Joseph  1950    Encounter Date: 5/21/2019    Boogie Ramirez M.D.          PROGRESS NOTE:  Chief Complaint   Patient presents with   • Atrial Fibrillation     F/V: 4 MO       Subjective:   Artem Joseph is a 69 y.o. male who presents today actually as a follow-up for his hypertension hyperlipidemia and paroxysmal atrial fibrillation.  Since we last saw him he had a event recorder showing only 7 nonsustained runs medic.  He said no further palpitations.  He had interval PET CT scan showing complete resolution of his melanoma.  His blood pressure is now at goal.  He continues to smoke about 5 cigarettes/day.  His blood pressures otherwise at goal.    Past Medical History:   Diagnosis Date   • Adrenal disorder (HCC)    • Arrhythmia    • Breath shortness    • Cancer (HCC)     melanoma in 2007, original 1989 from back of neck (chemo)   • Cataract    • Emphysema of lung (HCC)    • Heart burn    • Hypertension    • Indigestion    • Metastatic melanoma (HCC) 1990 / 2012    original neck lesion resected 1990 / recurrent lesion resected 2007 / third lesion resected 2012 /recurrence in 2016   • Thyroid disease      Past Surgical History:   Procedure Laterality Date   • CATARACT PHACO WITH IOL Left 11/6/2018    Procedure: CATARACT PHACO WITH IOL;  Surgeon: Shravan España M.D.;  Location: SURGERY SAME DAY [facility];  Service: Ophthalmology   • CATARACT PHACO WITH IOL Right 10/16/2018    Procedure: CATARACT PHACO WITH IOL;  Surgeon: Shravan España M.D.;  Location: SURGERY SAME DAY [facility];  Service: Ophthalmology   • COLONOSCOPY - ENDO  10/9/2016    Procedure: COLONOSCOPY - ENDO;  Surgeon: William Mitchell M.D.;  Location: SURGERY [facility];  Service:    • RECOVERY  7/26/2016    Procedure: CT-CT Guided abdominal mass-;  Surgeon:  "Recoveryonly Surgery;  [place] [facility];  Service:    • MASS EXCISION GENERAL  3/9/2012    Performed by MARLEN KEEN at SURGERY SAME DAY [facility]   • OTHER      lymph nodes back of neck     Family History   Problem Relation Age of Onset   • Heart Disease Mother    • Cancer Father    • Heart Disease Maternal Grandmother    • Heart Disease Maternal Grandfather    • Cancer Paternal Grandfather      Social History     Social History   • Marital status:      Spouse name: N/A   • Number of children: N/A   • Years of education: N/A     Occupational History   • Not on file.     Social History Main Topics   • Smoking status: Current Every Day Smoker     Packs/day: 0.50     Years: 40.00     Types: Cigarettes   • Smokeless tobacco: Never Used   • Alcohol use Yes      Comment: 1 drink per month    • Drug use: No   • Sexual activity: Not on file     Other Topics Concern   • Not on file     Social History Narrative   • No narrative on file     Allergies   Allergen Reactions   • Sulfa Drugs      Severe muscle pains     Outpatient Encounter Prescriptions as of 5/21/2019   Medication Sig Dispense Refill   • flecainide (TAMBOCOR) 100 MG Tab Take 1 Tab by mouth 2 times a day. 180 Tab 3   • DILTIAZem CD (CARTIA XT) 120 MG CAPSULE SR 24 HR Take 1 Cap by mouth every day. 90 Cap 3   • felodipine (PLENDIL) 2.5 MG TABLET SR 24 HR Take 1 Tab by mouth every day. 90 Tab 3   • testosterone cypionate (DEPO-TESTOSTERONE) 200 MG/ML Solution injection inject 0.5 milliliters intramuscularly every 10 days 3 mL 4   • hydrocortisone (CORTEF) 10 MG Tab take 2 tablets by mouth every morning and 1 tablet every evening 90 Tab 2   • B-D 3CC LUER-BO SYR 23GX1\" 23G X 1\" 3 ML Misc use as directed with TESTOSTERONE 6 Each 2   • Fluticasone-Umeclidin-Vilant (TRELEGY ELLIPTA) 100-62.5-25 MCG/INH AEROSOL POWDER, BREATH ACTIVATED Inhale 1 Puff by mouth every day. 2 Each 0   • levothyroxine (SYNTHROID) 150 MCG Tab Take 1 Tab by " mouth Every morning on an empty stomach. 90 Tab 3   • aspirin 81 MG EC tablet Take 1 Tab by mouth every day. 100 Tab 3   • tamsulosin (FLOMAX) 0.4 MG capsule Take 0.4 mg by mouth ONE-HALF HOUR AFTER BREAKFAST.     • simvastatin (ZOCOR) 20 MG TABS Take 20 mg by mouth every morning.     • omeprazole (PRILOSEC) 20 MG CPDR Take 20 mg by mouth every day.     • Multiple Vitamin (MULTIVITAMIN PO) Take  by mouth every day.     • ondansetron (ZOFRAN ODT) 4 MG TABLET DISPERSIBLE Take 4 mg by mouth every 6 hours as needed.  0   • [DISCONTINUED] FELODIPINE ER PO Take  by mouth.     • [DISCONTINUED] HYDROCODONE-ACETAMINOPHEN PO Take  by mouth.     • [DISCONTINUED] MULTIPLE VITAMIN PO Take  by mouth.     • [DISCONTINUED] OMEPRAZOLE PO Take  by mouth.     • [DISCONTINUED] VALSARTAN-HYDROCHLOROTHIAZIDE PO Take  by mouth.     • [DISCONTINUED] simvastatin (ZOCOR) 10 MG Tab by Does not apply route.     • [DISCONTINUED] CARTIA  MG CAPSULE SR 24 HR take 1 capsule by mouth once daily 90 Cap 3   • HYDROcodone-acetaminophen (NORCO) 7.5-325 MG per tablet Take 1-2 Tabs by mouth every 8 hours as needed.     • [DISCONTINUED] hydrocortisone (CORTEF) 10 MG Tab Take 1 Tab by mouth 3 times a day. (Patient taking differently: Take 7.5 mg by mouth 3 times a day. 20 mg  AM, 10 mg PM) 30 Tab 0   • [DISCONTINUED] felodipine (PLENDIL) 2.5 MG TABLET SR 24 HR Take 2.5 mg by mouth every day.     • [DISCONTINUED] flecainide (TAMBOCOR) 100 MG Tab Take 100 mg by mouth 2 times a day.       No facility-administered encounter medications on file as of 5/21/2019.      Review of Systems   Constitutional: Negative.  Negative for chills, fever and malaise/fatigue.   HENT: Negative.  Negative for sore throat.    Eyes: Negative.    Respiratory: Negative.  Negative for cough, hemoptysis, sputum production, shortness of breath, wheezing and stridor.    Cardiovascular: Negative.  Negative for chest pain, palpitations, orthopnea, claudication, leg swelling and PND.     Gastrointestinal: Negative.    Genitourinary: Negative.    Musculoskeletal: Negative.    Skin: Negative.    Neurological: Negative.  Negative for dizziness, loss of consciousness and weakness.   Endo/Heme/Allergies: Negative.  Does not bruise/bleed easily.   All other systems reviewed and are negative.       Objective:   /80 (BP Location: Left arm, Patient Position: Sitting, BP Cuff Size: Adult)   Pulse 78   Ht 1.829 m (6')   Wt 108.9 kg (240 lb)   SpO2 94%   BMI 32.55 kg/m²      Physical Exam   Constitutional: He appears well-developed and well-nourished. No distress.   HENT:   Head: Normocephalic and atraumatic.   Right Ear: External ear normal.   Left Ear: External ear normal.   Nose: Nose normal.   Mouth/Throat: No oropharyngeal exudate.   Eyes: Pupils are equal, round, and reactive to light. Conjunctivae and EOM are normal. Right eye exhibits no discharge. Left eye exhibits no discharge. No scleral icterus.   Neck: Neck supple. No JVD present.   Cardiovascular: Normal rate, regular rhythm and intact distal pulses.  Exam reveals no gallop and no friction rub.    No murmur heard.  Pulmonary/Chest: Effort normal. No stridor. No respiratory distress. He has no wheezes. He has no rales. He exhibits no tenderness.   Abdominal: Soft. He exhibits no distension. There is no guarding.   Musculoskeletal: Normal range of motion. He exhibits no edema, tenderness or deformity.   Neurological: He is alert. He has normal reflexes. He displays normal reflexes. No cranial nerve deficit. He exhibits normal muscle tone. Coordination normal.   Skin: Skin is warm and dry. No rash noted. He is not diaphoretic. No erythema. No pallor.   Psychiatric: He has a normal mood and affect. His behavior is normal. Judgment and thought content normal.   Nursing note and vitals reviewed.      Assessment:     1. Unstable angina pectoris (HCC)     2. Atrial fibrillation with rapid ventricular response (HCC)  flecainide (TAMBOCOR) 100  MG Tab    DILTIAZem CD (CARTIA XT) 120 MG CAPSULE SR 24 HR   3. Adrenal insufficiency (HCC)     4. Essential hypertension  DILTIAZem CD (CARTIA XT) 120 MG CAPSULE SR 24 HR    felodipine (PLENDIL) 2.5 MG TABLET SR 24 HR   5. Metastatic melanoma (HCC)     6. Polycythemia     7. Primary testicular hypogonadism     8. Tobacco abuse     9. Hypokalemia         Medical Decision Making:  Today's Assessment / Status / Plan:   Atrial fibrillation with rapid ventricular response (CMS-HCC)  Continue flecainide 100 twice daily  Continue Cartia 120 SR daily    Chest pain due to myocardial ischemia (CMS-HCC)  No recurrence  Clinical follow-up    Hypertension  At goal  Continue felodipine 2.5 daily    Hypokalemia  Resolved    Metastatic melanoma (CMS-HCC)  In remission  Last PET scan showed no evidence FDG uptake        Yon Madison M.D.  [address] [place] [address]  VIA Facsimile: [phone]

## 2019-06-09 LAB
BASOPHILS # BLD AUTO: 0 X10E3/UL (ref 0–0.2)
BASOPHILS NFR BLD AUTO: 0 %
BUN SERPL-MCNC: 16 MG/DL (ref 8–27)
BUN/CREAT SERPL: 13 (ref 10–24)
CALCIUM SERPL-MCNC: 9.4 MG/DL (ref 8.6–10.2)
CHLORIDE SERPL-SCNC: 108 MMOL/L (ref 96–106)
CO2 SERPL-SCNC: 23 MMOL/L (ref 20–29)
CREAT SERPL-MCNC: 1.21 MG/DL (ref 0.76–1.27)
EOSINOPHIL # BLD AUTO: 0.1 X10E3/UL (ref 0–0.4)
EOSINOPHIL NFR BLD AUTO: 1 %
ERYTHROCYTE [DISTWIDTH] IN BLOOD BY AUTOMATED COUNT: 15.7 % (ref 12.3–15.4)
ESTRADIOL SERPL-MCNC: 40.9 PG/ML (ref 7.6–42.6)
GLUCOSE SERPL-MCNC: 77 MG/DL (ref 65–99)
HCT VFR BLD AUTO: 51.1 % (ref 37.5–51)
HGB BLD-MCNC: 17.2 G/DL (ref 13–17.7)
IMM GRANULOCYTES # BLD AUTO: 0 X10E3/UL (ref 0–0.1)
IMM GRANULOCYTES NFR BLD AUTO: 0 %
IMMATURE CELLS  115398: ABNORMAL
LYMPHOCYTES # BLD AUTO: 2.4 X10E3/UL (ref 0.7–3.1)
LYMPHOCYTES NFR BLD AUTO: 30 %
MCH RBC QN AUTO: 29.8 PG (ref 26.6–33)
MCHC RBC AUTO-ENTMCNC: 33.7 G/DL (ref 31.5–35.7)
MCV RBC AUTO: 89 FL (ref 79–97)
MONOCYTES # BLD AUTO: 0.9 X10E3/UL (ref 0.1–0.9)
MONOCYTES NFR BLD AUTO: 11 %
MORPHOLOGY BLD-IMP: ABNORMAL
NEUTROPHILS # BLD AUTO: 4.6 X10E3/UL (ref 1.4–7)
NEUTROPHILS NFR BLD AUTO: 58 %
NRBC BLD AUTO-RTO: ABNORMAL %
PLATELET # BLD AUTO: 111 X10E3/UL (ref 150–450)
POTASSIUM SERPL-SCNC: 4.2 MMOL/L (ref 3.5–5.2)
RBC # BLD AUTO: 5.77 X10E6/UL (ref 4.14–5.8)
SODIUM SERPL-SCNC: 146 MMOL/L (ref 134–144)
TESTOST FREE SERPL-MCNC: 7 PG/ML (ref 6.6–18.1)
WBC # BLD AUTO: 8 X10E3/UL (ref 3.4–10.8)

## 2019-06-11 RX ORDER — HYDROCORTISONE 10 MG/1
TABLET ORAL
Qty: 90 TAB | Refills: 2 | Status: SHIPPED | OUTPATIENT
Start: 2019-06-11 | End: 2019-09-12 | Stop reason: SDUPTHER

## 2019-06-11 NOTE — TELEPHONE ENCOUNTER
Was the patient seen in the last year in this department? Yes    Does patient have an active prescription for medications requested? No     Received Request Via: Pharmacy     hydrocortisone (CORTEF) 10 MG Tab  take 2 tablets by mouth every morning and 1 tablet every evening

## 2019-07-15 DIAGNOSIS — E03.9 HYPOTHYROIDISM, UNSPECIFIED TYPE: ICD-10-CM

## 2019-07-16 ENCOUNTER — OFFICE VISIT (OUTPATIENT)
Dept: ENDOCRINOLOGY | Facility: MEDICAL CENTER | Age: 69
End: 2019-07-16
Payer: MEDICARE

## 2019-07-16 VITALS
HEART RATE: 90 BPM | HEIGHT: 72 IN | DIASTOLIC BLOOD PRESSURE: 78 MMHG | WEIGHT: 231 LBS | SYSTOLIC BLOOD PRESSURE: 120 MMHG | BODY MASS INDEX: 31.29 KG/M2

## 2019-07-16 DIAGNOSIS — E03.9 HYPOTHYROIDISM, UNSPECIFIED TYPE: ICD-10-CM

## 2019-07-16 DIAGNOSIS — E27.40 ADRENAL INSUFFICIENCY (HCC): ICD-10-CM

## 2019-07-16 DIAGNOSIS — D75.1 POLYCYTHEMIA: ICD-10-CM

## 2019-07-16 DIAGNOSIS — E28.0 ESTRADIOL EXCESS: ICD-10-CM

## 2019-07-16 DIAGNOSIS — E29.1 PRIMARY TESTICULAR HYPOGONADISM: ICD-10-CM

## 2019-07-16 PROCEDURE — 99214 OFFICE O/P EST MOD 30 MIN: CPT | Performed by: INTERNAL MEDICINE

## 2019-07-16 RX ORDER — LEVOTHYROXINE SODIUM 0.15 MG/1
TABLET ORAL
Qty: 90 TAB | Refills: 3 | Status: SHIPPED | OUTPATIENT
Start: 2019-07-16 | End: 2019-07-16

## 2019-07-16 RX ORDER — LEVOTHYROXINE SODIUM 0.15 MG/1
150 TABLET ORAL
Qty: 90 TAB | Refills: 3 | Status: SHIPPED | OUTPATIENT
Start: 2019-07-16 | End: 2020-07-22

## 2019-07-16 NOTE — TELEPHONE ENCOUNTER
Was the patient seen in the last year in this department? Yes 7/16/19    Does patient have an active prescription for medications requested? No     Received Request Via: Pharmacy     levothyroxine (SYNTHROID) 150 MCG Tab  take 1 tablet by mouth once daily

## 2019-07-17 NOTE — PROGRESS NOTES
"Chief Complaint   Patient presents with   • Hypogonadism     Primary testicular failure due to immunotherapy   • Adrenal Insufficiency        HPI:    See assessment and recommendations below    ROS:  Elevated PSA noted elevation around 6.5 and will be followed by [facility].  No biopsy planned      Allergies:   Allergies   Allergen Reactions   • Sulfa Drugs      Severe muscle pains       Current medicines including changes today:  Current Outpatient Prescriptions   Medication Sig Dispense Refill   • DEPO-TESTOSTERONE 200 MG/ML Solution injection   0   • levothyroxine (SYNTHROID) 150 MCG Tab Take 1 Tab by mouth Every morning on an empty stomach. 90 Tab 3   • hydrocortisone (CORTEF) 10 MG Tab take 2 tablets by mouth every morning and 1 tablet every evening 90 Tab 2   • flecainide (TAMBOCOR) 100 MG Tab Take 1 Tab by mouth 2 times a day. 180 Tab 3   • felodipine (PLENDIL) 2.5 MG TABLET SR 24 HR Take 1 Tab by mouth every day. 90 Tab 3   • B-D 3CC LUER-BO SYR 23GX1\" 23G X 1\" 3 ML Misc use as directed with TESTOSTERONE 6 Each 2   • HYDROcodone-acetaminophen (NORCO) 7.5-325 MG per tablet Take 1-2 Tabs by mouth every 8 hours as needed.     • Fluticasone-Umeclidin-Vilant (TRELEGY ELLIPTA) 100-62.5-25 MCG/INH AEROSOL POWDER, BREATH ACTIVATED Inhale 1 Puff by mouth every day. 2 Each 0   • tamsulosin (FLOMAX) 0.4 MG capsule Take 0.4 mg by mouth ONE-HALF HOUR AFTER BREAKFAST.     • simvastatin (ZOCOR) 20 MG TABS Take 20 mg by mouth every morning.     • omeprazole (PRILOSEC) 20 MG CPDR Take 20 mg by mouth every day.     • Multiple Vitamin (MULTIVITAMIN PO) Take  by mouth every day.     • DILTIAZem CD (CARTIA XT) 120 MG CAPSULE SR 24 HR Take 1 Cap by mouth every day. 90 Cap 3   • ondansetron (ZOFRAN ODT) 4 MG TABLET DISPERSIBLE Take 4 mg by mouth every 6 hours as needed.  0   • aspirin 81 MG EC tablet Take 1 Tab by mouth every day. (Patient not taking: Reported on 7/16/2019) 100 Tab 3     No current facility-administered " "medications for this visit.         Past Medical History:   Diagnosis Date   • Adrenal disorder (HCC)    • Arrhythmia    • Breath shortness    • Cancer (HCC)     melanoma in 2007, original 1989 from back of neck (chemo)   • Cataract    • Emphysema of lung (HCC)    • Heart burn    • Hypertension    • Indigestion    • Metastatic melanoma (HCC) 1990 / 2012    original neck lesion resected 1990 / recurrent lesion resected 2007 / third lesion resected 2012 /recurrence in 2016   • Thyroid disease        PHYSICAL EXAM:    /78 (BP Location: Left arm, Patient Position: Sitting, BP Cuff Size: Adult)   Pulse 90   Ht 1.829 m (6' 0.01\")   Wt 104.8 kg (231 lb)   BMI 31.32 kg/m²     Gen.   appears healthy     Skin   appropriate for sex and age    HEENT  unremarkable    Neck   no adenopathy    Breast    normal male fatty tissue, no gynecomastia    Heart  regular    Extremities  no edema    Neuro  gait and station normal    Psych  appropriate, good spirits      ASSESSMENT AND RECOMMENDATIONS    1. Primary testicular hypogonadism            This has occurred as a consequence of previous immunotherapy.             He is very satisfied with his current replacement of testosterone 100 mg IM every 10 days              Current free testosterone is low normal at 7.0.  5 days after previous injection  - TESTOSTERONE,FREE ADULT MALE; Future  - IGF-1 SOMATOMEDIN; Future    2. Adrenal insufficiency (HCC)            Adequately replaced with 10 mg twice daily and he is aware of need to increase dose times of illness   - IGF-1 SOMATOMEDIN; Future  - Basic Metabolic Panel; Future    3. Hypothyroidism, unspecified type             Clinically euthyroid taking levothyroxine 150 mcg/day.              Last lab was 1 year ago with TSH 1.6 and free T4 1.5  - IGF-1 SOMATOMEDIN; Future  - FREE THYROXINE; Future  - TSH; Future  - levothyroxine (SYNTHROID) 150 MCG Tab; Take 1 Tab by mouth Every morning on an empty stomach.  Dispense: 90 Tab; " Refill: 3    4. Estradiol excess              Current estradiol level upper normal 30.9.  No breast swelling or tenderness  - ESTRADIOL; Future    5. Polycythemia             Uses nocturnal oxygen             Current hemoglobin 17.2 and hematocrit 51.  Phlebotomy not indicated  - CBC WITHOUT DIFFERENTIAL; Future      DISPOSITION: No changes indicated in endocrine replacements                            Return and review in 6 months.      Paco Manrique M.D.    Copies to: Yon Madison M.D. [phone]                    [facility]

## 2019-07-23 ENCOUNTER — TELEPHONE (OUTPATIENT)
Dept: ENDOCRINOLOGY | Facility: MEDICAL CENTER | Age: 69
End: 2019-07-23

## 2019-07-23 NOTE — TELEPHONE ENCOUNTER
FINAL PRIOR AUTHORIZATION STATUS:    1.  Name of Medication & Dose: Depo-testosterone 200mg     2. Prior Auth Status (if approved--length of approval):  Approved through 7/20/20.    3. Action Taken: Pharmacy Notified: yes    Documentation was scanned into media and attached to this telephone encounter.

## 2019-08-13 DIAGNOSIS — E29.1 HYPOGONADISM IN MALE: ICD-10-CM

## 2019-08-13 NOTE — TELEPHONE ENCOUNTER
Was the patient seen in the last year in this department? Yes    Does patient have an active prescription for medications requested? No     Received Request Via: Patient     testosterone cypionate (DEPO-TESTOSTERONE) 200 MG/ML Solution injection [[id number]]  ENDED     Order Details   Dose, Route, Frequency: As Directed    Dispense Quantity: 3 mL Refills: 4 Fills remaining: --           Sig: inject 0.5 milliliters intramuscularly every 10 days            **Last office visit 7/16/19**    Patient needs RX to go to [facility] in [place].

## 2019-09-12 RX ORDER — HYDROCORTISONE 10 MG/1
TABLET ORAL
Qty: 90 TAB | Refills: 2 | Status: SHIPPED | OUTPATIENT
Start: 2019-09-12 | End: 2019-12-23 | Stop reason: SDUPTHER

## 2019-09-12 NOTE — TELEPHONE ENCOUNTER
Was the patient seen in the last year in this department? Yes    Does patient have an active prescription for medications requested? No     Received Request Via: Pharmacy     Last seen 07/16/2019

## 2019-09-27 RX ORDER — SYRINGE WITH NEEDLE, 1 ML 25GX5/8"
SYRINGE, EMPTY DISPOSABLE MISCELLANEOUS
Qty: 6 EACH | Refills: 2 | Status: SHIPPED | OUTPATIENT
Start: 2019-09-27 | End: 2020-03-24

## 2019-09-27 NOTE — TELEPHONE ENCOUNTER
"Was the patient seen in the last year in this department? Yes    Does patient have an active prescription for medications requested? No     Received Request Via: Pharmacy     B-D 3CC LUER-BO SYR 23GX1\" 23G X 1\" 3 ML Misc 6 Each 2/2 2/28/2019    Sig: use as directed with TESTOSTERONE       "

## 2019-10-14 ENCOUNTER — OFFICE VISIT (OUTPATIENT)
Dept: PULMONOLOGY | Facility: HOSPICE | Age: 69
End: 2019-10-14
Payer: MEDICARE

## 2019-10-14 ENCOUNTER — TELEPHONE (OUTPATIENT)
Dept: PULMONOLOGY | Facility: HOSPICE | Age: 69
End: 2019-10-14

## 2019-10-14 VITALS
TEMPERATURE: 97.9 F | OXYGEN SATURATION: 98 % | BODY MASS INDEX: 31.15 KG/M2 | DIASTOLIC BLOOD PRESSURE: 70 MMHG | HEART RATE: 74 BPM | RESPIRATION RATE: 16 BRPM | SYSTOLIC BLOOD PRESSURE: 110 MMHG | WEIGHT: 230 LBS | HEIGHT: 72 IN

## 2019-10-14 DIAGNOSIS — R06.02 SOB (SHORTNESS OF BREATH): ICD-10-CM

## 2019-10-14 DIAGNOSIS — Z72.0 TOBACCO USE: ICD-10-CM

## 2019-10-14 DIAGNOSIS — J44.9 CHRONIC OBSTRUCTIVE PULMONARY DISEASE, UNSPECIFIED COPD TYPE (HCC): ICD-10-CM

## 2019-10-14 DIAGNOSIS — R63.8 INCREASED BMI: ICD-10-CM

## 2019-10-14 DIAGNOSIS — R09.02 HYPOXEMIA: ICD-10-CM

## 2019-10-14 DIAGNOSIS — D75.1 POLYCYTHEMIA: ICD-10-CM

## 2019-10-14 PROCEDURE — 99214 OFFICE O/P EST MOD 30 MIN: CPT | Performed by: INTERNAL MEDICINE

## 2019-10-14 RX ORDER — GENTAMICIN SULFATE 3 MG/ML
SOLUTION/ DROPS OPHTHALMIC
Refills: 0 | COMMUNITY
Start: 2019-07-29 | End: 2021-12-16

## 2019-10-14 RX ORDER — VALSARTAN AND HYDROCHLOROTHIAZIDE 80; 12.5 MG/1; MG/1
TABLET, FILM COATED ORAL
COMMUNITY
End: 2020-12-09

## 2019-10-14 ASSESSMENT — PAIN SCALES - GENERAL: PAINLEVEL: NO PAIN

## 2019-10-14 NOTE — PROGRESS NOTES
No chief complaint on file.      HPI:  The patient is a 69-year-old man who has a diagnosis of metastatic melanoma.  He also has a history consistent with COPD.  He has smoked a pack of cigarettes a day for 48 years.  He is still smoking about a half a pack of cigarettes per day.   He does have a chronic cough productive of clear sputum.  He has no history of hemoptysis.  He denies significant wheezing.  He previously worked as a  in [place].  His diagnosis and subsequent extensive treatment of his malignant melanoma as outlined in Dr. Agee's notes.  He is also been found to have adrenal insufficiency.  He is being followed by Dr. Wilson.  He previously had been on prednisone and tells me that when on prednisone he had no breathing problems.  He is now on hydrocortisone.  Pulmonary function testing  demonstrates an FEV1 of 2.42 L which is 72% of predicted.  His FEV1 FVC ratio is reduced at 52%.  He has 18% improvement after an inhaled bronchodilator.  His DLCO is 80% of predicted.  He is in no distress currently.  His CT scan demonstrates a 5 mm nodule in the lingula with some atelectasis at the bases.  He has no significant infiltrates.  He does have evidence of emphysema.  There is no evidence of any significant interstitial lung disease.  He is being treated with Trelegy.  He feels that it has helped significantly.  He is breathing much better.  He has been on testosterone replacement therapy and has a history of nocturnal hypoxemia.  He has been noted to have polycythemia and had a phlebotomy last month.  He is scheduled to see Dr. Agee in January with repeat scanning.    Past Medical History:   Diagnosis Date   • Adrenal disorder (HCC)    • Arrhythmia    • Breath shortness    • Cancer (HCC)     melanoma in 2007, original 1989 from back of neck (chemo)   • Cataract    • Emphysema of lung (HCC)    • Heart burn    • Hypertension    • Indigestion    • Metastatic melanoma (HCC) 1990 / 2012     original neck lesion resected 1990 / recurrent lesion resected 2007 / third lesion resected 2012 /recurrence in 2016   • Thyroid disease        ROS:   Constitutional: Denies fevers, chills, night sweats, fatigue or weight loss  Eyes: Denies vision loss, pain, drainage, double vision  Ears, Nose, Throat: Denies earache, tinnitus, hoarseness  Cardiovascular: Denies chest pain, tightness, palpitations  Respiratory: Denies shortness of breath, sputum production, cough, hemoptysis  Sleep: Denies, snoring, apnea  GI: Denies abdominal pain, nausea, vomiting, diarrhea  : Denies frequent urination, hematuria, painful urination  Musculoskeletal: Denies back pain, painful joints, sore muscles  Neurological: Denies headaches, seizures  Skin: Denies rashes, color changes  Psychiatric: Denies depression or thoughts of suicide  Hematologic: Denies bleeding tendency or clotting tendency  Allergic/Immunologic: Denies rhinitis, skin sensitivity    Social History     Socioeconomic History   • Marital status:      Spouse name: Not on file   • Number of children: Not on file   • Years of education: Not on file   • Highest education level: Not on file   Occupational History   • Not on file   Social Needs   • Financial resource strain: Not on file   • Food insecurity:     Worry: Not on file     Inability: Not on file   • Transportation needs:     Medical: Not on file     Non-medical: Not on file   Tobacco Use   • Smoking status: Current Every Day Smoker     Packs/day: 0.50     Years: 40.00     Pack years: 20.00     Types: Cigarettes   • Smokeless tobacco: Never Used   Substance and Sexual Activity   • Alcohol use: Yes     Comment: 1 drink per month    • Drug use: No   • Sexual activity: Not on file   Lifestyle   • Physical activity:     Days per week: Not on file     Minutes per session: Not on file   • Stress: Not on file   Relationships   • Social connections:     Talks on phone: Not on file     Gets together: Not on file     " Attends [employer] service: Not on file     Active member of club or organization: Not on file     Attends meetings of clubs or organizations: Not on file     Relationship status: Not on file   • Intimate partner violence:     Fear of current or ex partner: Not on file     Emotionally abused: Not on file     Physically abused: Not on file     Forced sexual activity: Not on file   Other Topics Concern   • Not on file   Social History Narrative   • Not on file     Sulfa drugs  Current Outpatient Medications on File Prior to Visit   Medication Sig Dispense Refill   • B-D 3CC LUER-BO SYR 23GX1\" 23G X 1\" 3 ML Misc use as directed WITH TESTOSTERONE 6 Each 2   • hydrocortisone (CORTEF) 10 MG Tab take 2 tablets by mouth every morning and 1 tablet every evening 90 Tab 2   • DEPO-TESTOSTERONE 200 MG/ML Solution injection 0.5 mL by Intramuscular route every 10 days for 10 doses. 5 mL 1   • levothyroxine (SYNTHROID) 150 MCG Tab Take 1 Tab by mouth Every morning on an empty stomach. 90 Tab 3   • flecainide (TAMBOCOR) 100 MG Tab Take 1 Tab by mouth 2 times a day. 180 Tab 3   • DILTIAZem CD (CARTIA XT) 120 MG CAPSULE SR 24 HR Take 1 Cap by mouth every day. 90 Cap 3   • felodipine (PLENDIL) 2.5 MG TABLET SR 24 HR Take 1 Tab by mouth every day. 90 Tab 3   • ondansetron (ZOFRAN ODT) 4 MG TABLET DISPERSIBLE Take 4 mg by mouth every 6 hours as needed.  0   • HYDROcodone-acetaminophen (NORCO) 7.5-325 MG per tablet Take 1-2 Tabs by mouth every 8 hours as needed.     • Fluticasone-Umeclidin-Vilant (TRELEGY ELLIPTA) 100-62.5-25 MCG/INH AEROSOL POWDER, BREATH ACTIVATED Inhale 1 Puff by mouth every day. 2 Each 0   • aspirin 81 MG EC tablet Take 1 Tab by mouth every day. (Patient not taking: Reported on 7/16/2019) 100 Tab 3   • tamsulosin (FLOMAX) 0.4 MG capsule Take 0.4 mg by mouth ONE-HALF HOUR AFTER BREAKFAST.     • simvastatin (ZOCOR) 20 MG TABS Take 20 mg by mouth every morning.     • omeprazole (PRILOSEC) 20 MG CPDR Take 20 mg by mouth " every day.     • Multiple Vitamin (MULTIVITAMIN PO) Take  by mouth every day.       No current facility-administered medications on file prior to visit.      There were no vitals taken for this visit.  Family History   Problem Relation Age of Onset   • Heart Disease Mother    • Cancer Father    • Heart Disease Maternal Grandmother    • Heart Disease Maternal Grandfather    • Cancer Paternal Grandfather        Physical Exam:    HEENT: PERRLA, EOMI, no scleral icterus, no nasal or oral lesions  Neck: No thyromegaly, no adenopathy, no bruits  Mallampatti: Grade II  Lungs: Equal breath sounds, no wheezes or crackles  Heart: Regular rate and rhythm, no gallops or murmurs  Abdomen: Soft, benign, no organomegaly  Extremities: No clubbing, cyanosis, or edema  Neurologic: Cranial nerve, motor, and sensory exam are normal    1. Chronic obstructive pulmonary disease, unspecified COPD type (HCC)    2. SOB (shortness of breath)    3. Polycythemia    4. Hypoxemia    5. Tobacco use      He is doing very well on Trelegy.  We will make no changes.  He will continue on supplemental oxygen at 2 L/min at night.  Once again I have strongly suggested that he discontinue smoking entirely.  He will continue to follow-up with Dr. Elias and Dr. Agee.  We will see him in the future on an as-needed basis.

## 2019-10-14 NOTE — TELEPHONE ENCOUNTER
I called [facility] in [place] and spoke with the pharmacy. Patient has one refill left on his Trelegy and I placed 11 refills on file. Dr Musa wrote for Trelegy today. Patient just got out of the office without his new RX.

## 2019-11-20 ENCOUNTER — OFFICE VISIT (OUTPATIENT)
Dept: CARDIOLOGY | Facility: MEDICAL CENTER | Age: 69
End: 2019-11-20
Payer: MEDICARE

## 2019-11-20 VITALS
HEART RATE: 88 BPM | DIASTOLIC BLOOD PRESSURE: 92 MMHG | BODY MASS INDEX: 32.23 KG/M2 | SYSTOLIC BLOOD PRESSURE: 140 MMHG | HEIGHT: 72 IN | OXYGEN SATURATION: 93 % | WEIGHT: 238 LBS

## 2019-11-20 DIAGNOSIS — I48.91 ATRIAL FIBRILLATION WITH RAPID VENTRICULAR RESPONSE (HCC): ICD-10-CM

## 2019-11-20 DIAGNOSIS — E27.40 ADRENAL INSUFFICIENCY (HCC): ICD-10-CM

## 2019-11-20 DIAGNOSIS — E29.1 PRIMARY TESTICULAR HYPOGONADISM: ICD-10-CM

## 2019-11-20 DIAGNOSIS — Z72.0 TOBACCO ABUSE: ICD-10-CM

## 2019-11-20 DIAGNOSIS — D75.1 POLYCYTHEMIA: ICD-10-CM

## 2019-11-20 DIAGNOSIS — I10 ESSENTIAL HYPERTENSION: ICD-10-CM

## 2019-11-20 DIAGNOSIS — I20.0 UNSTABLE ANGINA PECTORIS (HCC): ICD-10-CM

## 2019-11-20 PROCEDURE — 99214 OFFICE O/P EST MOD 30 MIN: CPT | Mod: 25 | Performed by: INTERNAL MEDICINE

## 2019-11-20 PROCEDURE — 99406 BEHAV CHNG SMOKING 3-10 MIN: CPT | Performed by: INTERNAL MEDICINE

## 2019-11-20 RX ORDER — INFLUENZA A VIRUS A/BRISBANE/02/2018 IVR-190 (H1N1) ANTIGEN (PROPIOLACTONE INACTIVATED), INFLUENZA A VIRUS A/KANSAS/14/2017 X-327 (H3N2) ANTIGEN (PROPIOLACTONE INACTIVATED), INFLUENZA B VIRUS B/MARYLAND/15/2016 ANTIGEN (PROPIOLACTONE INACTIVATED), INFLUENZA B VIRUS B/PHUKET/3073/2013 BVR-1B ANTIGEN (PROPIOLACTONE INACTIVATED) 15; 15; 15; 15 UG/.5ML; UG/.5ML; UG/.5ML; UG/.5ML
INJECTION, SUSPENSION INTRAMUSCULAR
COMMUNITY
Start: 2019-11-04 | End: 2019-11-20

## 2019-11-20 RX ORDER — FLECAINIDE ACETATE 100 MG/1
100 TABLET ORAL 2 TIMES DAILY
Qty: 180 TAB | Refills: 3 | Status: SHIPPED | OUTPATIENT
Start: 2019-11-20 | End: 2020-07-23 | Stop reason: SDUPTHER

## 2019-11-20 ASSESSMENT — ENCOUNTER SYMPTOMS
LOSS OF CONSCIOUSNESS: 0
DIZZINESS: 0
BRUISES/BLEEDS EASILY: 0
GASTROINTESTINAL NEGATIVE: 1
NEUROLOGICAL NEGATIVE: 1
EYES NEGATIVE: 1
WEAKNESS: 0
SPUTUM PRODUCTION: 0
CARDIOVASCULAR NEGATIVE: 1
MUSCULOSKELETAL NEGATIVE: 1
COUGH: 0
CHILLS: 0
STRIDOR: 0
PALPITATIONS: 0
HEMOPTYSIS: 0
SORE THROAT: 0
SHORTNESS OF BREATH: 0
CONSTITUTIONAL NEGATIVE: 1
CLAUDICATION: 0
ORTHOPNEA: 0
FEVER: 0
RESPIRATORY NEGATIVE: 1
WHEEZING: 0
PND: 0

## 2019-11-20 NOTE — PROGRESS NOTES
Chief Complaint   Patient presents with   • Angina (Unstable)     F/V: 6 MO       Subjective:   Maykel Joseph is a 69 y.o. male who presents today as a follow-up for his atrial fibrillation melanoma chest pain and tobacco abuse.  He continues to smoke.  His most recent PET scan showed no active melanoma.  He said no palpitations.  We had him wear an event recorder back in February that showed no atrial fibrillation.  He is not taking an oral anticoagulant.  He continues on flecainide.    Past Medical History:   Diagnosis Date   • Adrenal disorder (HCC)    • Arrhythmia    • Breath shortness    • Cancer (HCC)     melanoma in 2007, original 1989 from back of neck (chemo)   • Cataract    • Emphysema of lung (HCC)    • Heart burn    • Hypertension    • Indigestion    • Metastatic melanoma (HCC) 1990 / 2012    original neck lesion resected 1990 / recurrent lesion resected 2007 / third lesion resected 2012 /recurrence in 2016   • Thyroid disease      Past Surgical History:   Procedure Laterality Date   • CATARACT PHACO WITH IOL Left 11/6/2018    Procedure: CATARACT PHACO WITH IOL;  Surgeon: Shravan España M.D.;  Location: SURGERY SAME DAY [facility];  Service: Ophthalmology   • CATARACT PHACO WITH IOL Right 10/16/2018    Procedure: CATARACT PHACO WITH IOL;  Surgeon: Shravan España M.D.;  Location: SURGERY SAME DAY [facility];  Service: Ophthalmology   • COLONOSCOPY - ENDO  10/9/2016    Procedure: COLONOSCOPY - ENDO;  Surgeon: William Mitchell M.D.;  Location: SURGERY [facility];  Service:    • RECOVERY  7/26/2016    Procedure: CT-CT Guided abdominal mass-;  Surgeon: [facility];  Location: SURGERY PRE-POST PROC UNIT [facility];  Service:    • MASS EXCISION GENERAL  3/9/2012    Performed by MARLEN KEEN at SURGERY SAME DAY [facility] ORS   • OTHER      lymph nodes back of neck     Family History   Problem Relation Age of Onset   • Heart Disease Mother    • Cancer Father    • Heart Disease Maternal  Grandmother    • Heart Disease Maternal Grandfather    • Cancer Paternal Grandfather      Social History     Socioeconomic History   • Marital status:      Spouse name: Not on file   • Number of children: Not on file   • Years of education: Not on file   • Highest education level: Not on file   Occupational History   • Not on file   Social Needs   • Financial resource strain: Not on file   • Food insecurity:     Worry: Not on file     Inability: Not on file   • Transportation needs:     Medical: Not on file     Non-medical: Not on file   Tobacco Use   • Smoking status: Current Every Day Smoker     Packs/day: 0.50     Years: 40.00     Pack years: 20.00     Types: Cigarettes   • Smokeless tobacco: Never Used   • Tobacco comment: did vape, but has not quit 10/2019   Substance and Sexual Activity   • Alcohol use: Yes     Comment: 1 drink per month    • Drug use: No   • Sexual activity: Not on file   Lifestyle   • Physical activity:     Days per week: Not on file     Minutes per session: Not on file   • Stress: Not on file   Relationships   • Social connections:     Talks on phone: Not on file     Gets together: Not on file     Attends Rastafarian service: Not on file     Active member of club or organization: Not on file     Attends meetings of clubs or organizations: Not on file     Relationship status: Not on file   • Intimate partner violence:     Fear of current or ex partner: Not on file     Emotionally abused: Not on file     Physically abused: Not on file     Forced sexual activity: Not on file   Other Topics Concern   • Not on file   Social History Narrative   • Not on file     Allergies   Allergen Reactions   • Sulfa Drugs      Severe muscle pains     Outpatient Encounter Medications as of 11/20/2019   Medication Sig Dispense Refill   • flecainide (TAMBOCOR) 100 MG Tab Take 1 Tab by mouth 2 times a day. 180 Tab 3   • valsartan-hydrochlorothiazide (DIOVAN-HCT) 80-12.5 MG per tablet Take  by mouth.     •  "PROAIR  (90 Base) MCG/ACT Aero Soln inhalation aerosol   0   • gentamicin (GARAMYCIN) 0.3 % Solution instill 1 drop into affected eye every 4 hours  0   • B-D 3CC LUER-BO SYR 23GX1\" 23G X 1\" 3 ML Misc use as directed WITH TESTOSTERONE 6 Each 2   • hydrocortisone (CORTEF) 10 MG Tab take 2 tablets by mouth every morning and 1 tablet every evening 90 Tab 2   • levothyroxine (SYNTHROID) 150 MCG Tab Take 1 Tab by mouth Every morning on an empty stomach. 90 Tab 3   • DILTIAZem CD (CARTIA XT) 120 MG CAPSULE SR 24 HR Take 1 Cap by mouth every day. 90 Cap 3   • felodipine (PLENDIL) 2.5 MG TABLET SR 24 HR Take 1 Tab by mouth every day. 90 Tab 3   • ondansetron (ZOFRAN ODT) 4 MG TABLET DISPERSIBLE Take 4 mg by mouth every 6 hours as needed.  0   • HYDROcodone-acetaminophen (NORCO) 7.5-325 MG per tablet Take 1-2 Tabs by mouth every 8 hours as needed.     • Fluticasone-Umeclidin-Vilant (TRELEGY ELLIPTA) 100-62.5-25 MCG/INH AEROSOL POWDER, BREATH ACTIVATED Inhale 1 Puff by mouth every day. 2 Each 0   • tamsulosin (FLOMAX) 0.4 MG capsule Take 0.4 mg by mouth ONE-HALF HOUR AFTER BREAKFAST.     • simvastatin (ZOCOR) 20 MG TABS Take 20 mg by mouth every morning.     • omeprazole (PRILOSEC) 20 MG CPDR Take 20 mg by mouth every day.     • Multiple Vitamin (MULTIVITAMIN PO) Take  by mouth every day.     • [DISCONTINUED] AFLURIA QUADRIVALENT 0.5 ML Suspension Prefilled Syringe injection      • [DISCONTINUED] FELODIPINE ER PO Take  by mouth.     • [DISCONTINUED] HYDROCODONE-ACETAMINOPHEN PO Take  by mouth.     • [DISCONTINUED] MULTIPLE VITAMIN PO Take  by mouth.     • [DISCONTINUED] OMEPRAZOLE PO Take  by mouth.     • [DISCONTINUED] SIMVASTATIN PO by Does not apply route.     • [DISCONTINUED] Fluticasone-Umeclidin-Vilant (TRELEGY ELLIPTA) 100-62.5-25 MCG/INH AEROSOL POWDER, BREATH ACTIVATED Inhale 1 Puff by mouth every day. 1 Each 11   • [DISCONTINUED] flecainide (TAMBOCOR) 100 MG Tab Take 1 Tab by mouth 2 times a day. 180 Tab 3 "   • [DISCONTINUED] aspirin 81 MG EC tablet Take 1 Tab by mouth every day. (Patient not taking: Reported on 7/16/2019) 100 Tab 3     No facility-administered encounter medications on file as of 11/20/2019.      Review of Systems   Constitutional: Negative.  Negative for chills, fever and malaise/fatigue.   HENT: Negative.  Negative for sore throat.    Eyes: Negative.    Respiratory: Negative.  Negative for cough, hemoptysis, sputum production, shortness of breath, wheezing and stridor.    Cardiovascular: Negative.  Negative for chest pain, palpitations, orthopnea, claudication, leg swelling and PND.   Gastrointestinal: Negative.    Genitourinary: Negative.    Musculoskeletal: Negative.    Skin: Negative.    Neurological: Negative.  Negative for dizziness, loss of consciousness and weakness.   Endo/Heme/Allergies: Negative.  Does not bruise/bleed easily.   All other systems reviewed and are negative.       Objective:   /92 (BP Location: Left arm, Patient Position: Sitting, BP Cuff Size: Adult)   Pulse 88   Ht 1.829 m (6')   Wt 108 kg (238 lb)   SpO2 93%   BMI 32.28 kg/m²     Physical Exam   Constitutional: He appears well-developed and well-nourished. No distress.   HENT:   Head: Normocephalic and atraumatic.   Right Ear: External ear normal.   Left Ear: External ear normal.   Nose: Nose normal.   Mouth/Throat: No oropharyngeal exudate.   Eyes: Pupils are equal, round, and reactive to light. Conjunctivae and EOM are normal. Right eye exhibits no discharge. Left eye exhibits no discharge. No scleral icterus.   Neck: Neck supple. No JVD present.   Cardiovascular: Normal rate, regular rhythm and intact distal pulses. Exam reveals no gallop and no friction rub.   No murmur heard.  Pulmonary/Chest: Effort normal. No stridor. No respiratory distress. He has no wheezes. He has no rales. He exhibits no tenderness.   Abdominal: Soft. He exhibits no distension. There is no guarding.   Musculoskeletal: Normal range of  motion.         General: No tenderness, deformity or edema.   Neurological: He is alert. He has normal reflexes. He displays normal reflexes. No cranial nerve deficit. He exhibits normal muscle tone. Coordination normal.   Skin: Skin is warm and dry. No rash noted. He is not diaphoretic. No erythema. No pallor.   Psychiatric: He has a normal mood and affect. His behavior is normal. Judgment and thought content normal.   Nursing note and vitals reviewed.      Assessment:     1. Adrenal insufficiency (HCC)     2. Atrial fibrillation with rapid ventricular response (HCC)  flecainide (TAMBOCOR) 100 MG Tab   3. Unstable angina pectoris (HCC)     4. Essential hypertension     5. Polycythemia     6. Primary testicular hypogonadism     7. Tobacco abuse         Medical Decision Making:  Today's Assessment / Status / Plan:     69-year-old male with atrial fibrillation with no recurrence.  He will stay on the flecainide.  We will keep him off an oral anticoagulant.  His blood pressures otherwise at goal.  He is doing well.  I did spend 5 minutes counseling him regarding smoking cessation and the fact that you are to be cancer once that he should not attempt faced a second time.  We will see him back in 6 months.

## 2019-11-26 ENCOUNTER — HOSPITAL ENCOUNTER (OUTPATIENT)
Dept: RADIOLOGY | Facility: MEDICAL CENTER | Age: 69
End: 2019-11-26
Attending: INTERNAL MEDICINE
Payer: MEDICARE

## 2019-11-26 DIAGNOSIS — C43.4 MALIGNANT MELANOMA OF SKIN OF SCALP AND NECK (HCC): ICD-10-CM

## 2019-11-26 PROCEDURE — 700117 HCHG RX CONTRAST REV CODE 255: Performed by: INTERNAL MEDICINE

## 2019-11-26 PROCEDURE — 70553 MRI BRAIN STEM W/O & W/DYE: CPT

## 2019-11-26 PROCEDURE — 71260 CT THORAX DX C+: CPT

## 2019-11-26 PROCEDURE — A9576 INJ PROHANCE MULTIPACK: HCPCS | Performed by: INTERNAL MEDICINE

## 2019-11-26 RX ADMIN — IOHEXOL 25 ML: 240 INJECTION, SOLUTION INTRATHECAL; INTRAVASCULAR; INTRAVENOUS; ORAL at 13:25

## 2019-11-26 RX ADMIN — GADOTERIDOL 20 ML: 279.3 INJECTION, SOLUTION INTRAVENOUS at 13:56

## 2019-11-26 RX ADMIN — IOHEXOL 100 ML: 350 INJECTION, SOLUTION INTRAVENOUS at 13:20

## 2019-12-23 RX ORDER — HYDROCORTISONE 10 MG/1
10 TABLET ORAL DAILY
Qty: 90 TAB | Refills: 2 | Status: SHIPPED | OUTPATIENT
Start: 2019-12-23 | End: 2020-01-20

## 2019-12-23 NOTE — TELEPHONE ENCOUNTER
Was the patient seen in the last year in this department? Yes 7/17/19    Does patient have an active prescription for medications requested? No     Received Request Via: Patient

## 2020-01-03 DIAGNOSIS — E29.1 HYPOGONADISM IN MALE: ICD-10-CM

## 2020-01-03 NOTE — TELEPHONE ENCOUNTER
Was the patient seen in the last year in this department? Yes 07/16/19    Does patient have an active prescription for medications requested? No     Received Request Via: Pharmacy     testosterone cypionate (DEPO-TESTOSTERONE) 200 MG/ML Solution injection        Sig: INJECT 0.5ML IN THE MUSCLE EVERY 10 DAYS FOR 10 DOSES

## 2020-01-04 RX ORDER — TESTOSTERONE CYPIONATE 200 MG/ML
INJECTION, SOLUTION INTRAMUSCULAR
Qty: 5 ML | Refills: 5 | Status: SHIPPED | OUTPATIENT
Start: 2020-01-04 | End: 2020-02-03

## 2020-01-05 LAB
AMBIG ABBREV BPM8  977205: NORMAL
BASOPHILS # BLD AUTO: 0 X10E3/UL (ref 0–0.2)
BASOPHILS NFR BLD AUTO: 0 %
BUN SERPL-MCNC: 13 MG/DL (ref 8–27)
BUN/CREAT SERPL: 12 (ref 10–24)
CALCIUM SERPL-MCNC: 9.8 MG/DL (ref 8.6–10.2)
CHLORIDE SERPL-SCNC: 107 MMOL/L (ref 96–106)
CO2 SERPL-SCNC: 23 MMOL/L (ref 20–29)
CREAT SERPL-MCNC: 1.07 MG/DL (ref 0.76–1.27)
EOSINOPHIL # BLD AUTO: 0.1 X10E3/UL (ref 0–0.4)
EOSINOPHIL NFR BLD AUTO: 1 %
ERYTHROCYTE [DISTWIDTH] IN BLOOD BY AUTOMATED COUNT: 14.7 % (ref 12.3–15.4)
ESTRADIOL SERPL-MCNC: 26.9 PG/ML (ref 7.6–42.6)
GLUCOSE SERPL-MCNC: 75 MG/DL (ref 65–99)
HCT VFR BLD AUTO: 52.4 % (ref 37.5–51)
HGB BLD-MCNC: 17.9 G/DL (ref 13–17.7)
IGF-I SERPL-MCNC: 138 NG/ML (ref 47–192)
IMM GRANULOCYTES # BLD AUTO: 0 X10E3/UL (ref 0–0.1)
IMM GRANULOCYTES NFR BLD AUTO: 0 %
IMMATURE CELLS  115398: ABNORMAL
LYMPHOCYTES # BLD AUTO: 2.6 X10E3/UL (ref 0.7–3.1)
LYMPHOCYTES NFR BLD AUTO: 28 %
MCH RBC QN AUTO: 31.3 PG (ref 26.6–33)
MCHC RBC AUTO-ENTMCNC: 34.2 G/DL (ref 31.5–35.7)
MCV RBC AUTO: 92 FL (ref 79–97)
MONOCYTES # BLD AUTO: 1.1 X10E3/UL (ref 0.1–0.9)
MONOCYTES NFR BLD AUTO: 12 %
MORPHOLOGY BLD-IMP: ABNORMAL
NEUTROPHILS # BLD AUTO: 5.4 X10E3/UL (ref 1.4–7)
NEUTROPHILS NFR BLD AUTO: 59 %
NRBC BLD AUTO-RTO: ABNORMAL %
PLATELET # BLD AUTO: 131 X10E3/UL (ref 150–450)
POTASSIUM SERPL-SCNC: 4.6 MMOL/L (ref 3.5–5.2)
RBC # BLD AUTO: 5.72 X10E6/UL (ref 4.14–5.8)
SODIUM SERPL-SCNC: 147 MMOL/L (ref 134–144)
T4 FREE SERPL-MCNC: 1.34 NG/DL (ref 0.82–1.77)
TESTOST FREE SERPL-MCNC: 4.8 PG/ML (ref 6.6–18.1)
TSH SERPL DL<=0.005 MIU/L-ACNC: 3.5 UIU/ML (ref 0.45–4.5)
WBC # BLD AUTO: 9.3 X10E3/UL (ref 3.4–10.8)

## 2020-01-20 ENCOUNTER — OFFICE VISIT (OUTPATIENT)
Dept: ENDOCRINOLOGY | Facility: MEDICAL CENTER | Age: 70
End: 2020-01-20
Payer: MEDICARE

## 2020-01-20 VITALS
DIASTOLIC BLOOD PRESSURE: 84 MMHG | OXYGEN SATURATION: 94 % | BODY MASS INDEX: 32.05 KG/M2 | HEART RATE: 88 BPM | HEIGHT: 72 IN | WEIGHT: 236.6 LBS | SYSTOLIC BLOOD PRESSURE: 126 MMHG

## 2020-01-20 DIAGNOSIS — E29.1 PRIMARY TESTICULAR HYPOGONADISM: ICD-10-CM

## 2020-01-20 DIAGNOSIS — E27.40 ADRENAL INSUFFICIENCY (HCC): ICD-10-CM

## 2020-01-20 DIAGNOSIS — D75.1 POLYCYTHEMIA, SECONDARY: ICD-10-CM

## 2020-01-20 PROCEDURE — 99214 OFFICE O/P EST MOD 30 MIN: CPT | Performed by: INTERNAL MEDICINE

## 2020-01-20 RX ORDER — HYDROCORTISONE 10 MG/1
20 TABLET ORAL 2 TIMES DAILY
Qty: 120 TAB | Refills: 5 | Status: SHIPPED | OUTPATIENT
Start: 2020-01-20 | End: 2020-08-03

## 2020-01-20 NOTE — PROGRESS NOTES
Chief Complaint   Patient presents with   • Hypogonadism   • Adrenal Insufficiency        HPI:      1. Hypogonadism.  This is a consequence of immunotherapy for metastatic melanoma a few years ago.  He is getting along well, he feels, taking IM testosterone 100 mg every 10 days.  Having a little difficulty with the pharmacy getting sufficient amount so I will call them and see if we can straighten that out.  That seems to be an adequate amount for him.  The levels are not excessive.  Current level of free testosterone is 4.8 (6.6-18).  He does not have elevated estradiol.  He does have polycythemia, however, with a hemoglobin of 17.9, hematocrit 52.  We did do a phlebotomy last summer as well so I think I am going to have to monitor this more regularly.  He does not have sleep apnea, but he does have some decrease in oxygenation such that he sleeps with oxygen at night.  I think that plus testosterone is provoking the polycythemia, so I will set up a therapeutic phlebotomy and have him monitored about once a month and do a phlebotomy as necessary.      In terms of his adrenal insufficiency he was taking 10 mg twice a day but feels that is insufficient.  He has much more energy and in all ways feels better when he is taking a total of 30 mg per day in divided doses.  I think that is all right.  I don’t see any cushingoid side effects.  So we will arrange a prescription for 10 mg tablets two twice a day but he understands he is going to hold it to 30 mg per day and then he will have some extra left over for stress dosing if and when it is necessary.  Along those lines he does not have a medic alert medallion and I told him to get one and wear it.      He has gone through a complete examination for his melanoma.  No signs of recurrence so he is feeling very good about that.      He did have a recent prostate examination including PSA he tells me which was satisfactory.  Urologist follows that.      We will try to get  "his hormones organized and I will see him again in six months.     ROS:  Has had a checkup for recurrent malignant melanoma and no trace found      Allergies:   Allergies   Allergen Reactions   • Sulfa Drugs      Severe muscle pains       Current medicines including changes today:  Current Outpatient Medications   Medication Sig Dispense Refill   • testosterone cypionate (DEPO-TESTOSTERONE) 200 MG/ML Solution injection INJECT 0.5ML IN THE MUSCLE EVERY 10 DAYS FOR 10 DOSES 5 mL 5   • hydrocortisone (CORTEF) 10 MG Tab Take 1 Tab by mouth every day. 90 Tab 2   • flecainide (TAMBOCOR) 100 MG Tab Take 1 Tab by mouth 2 times a day. 180 Tab 3   • valsartan-hydrochlorothiazide (DIOVAN-HCT) 80-12.5 MG per tablet Take  by mouth.     • PROAIR  (90 Base) MCG/ACT Aero Soln inhalation aerosol   0   • gentamicin (GARAMYCIN) 0.3 % Solution instill 1 drop into affected eye every 4 hours  0   • B-D 3CC LUER-BO SYR 23GX1\" 23G X 1\" 3 ML Misc use as directed WITH TESTOSTERONE 6 Each 2   • levothyroxine (SYNTHROID) 150 MCG Tab Take 1 Tab by mouth Every morning on an empty stomach. 90 Tab 3   • DILTIAZem CD (CARTIA XT) 120 MG CAPSULE SR 24 HR Take 1 Cap by mouth every day. 90 Cap 3   • felodipine (PLENDIL) 2.5 MG TABLET SR 24 HR Take 1 Tab by mouth every day. 90 Tab 3   • ondansetron (ZOFRAN ODT) 4 MG TABLET DISPERSIBLE Take 4 mg by mouth every 6 hours as needed.  0   • HYDROcodone-acetaminophen (NORCO) 7.5-325 MG per tablet Take 1-2 Tabs by mouth every 8 hours as needed.     • Fluticasone-Umeclidin-Vilant (TRELEGY ELLIPTA) 100-62.5-25 MCG/INH AEROSOL POWDER, BREATH ACTIVATED Inhale 1 Puff by mouth every day. 2 Each 0   • tamsulosin (FLOMAX) 0.4 MG capsule Take 0.4 mg by mouth ONE-HALF HOUR AFTER BREAKFAST.     • simvastatin (ZOCOR) 20 MG TABS Take 20 mg by mouth every morning.     • omeprazole (PRILOSEC) 20 MG CPDR Take 20 mg by mouth every day.     • Multiple Vitamin (MULTIVITAMIN PO) Take  by mouth every day.       No current " "facility-administered medications for this visit.         Past Medical History:   Diagnosis Date   • Adrenal disorder (HCC)    • Arrhythmia    • Breath shortness    • Cancer (HCC)     melanoma in 2007, original 1989 from back of neck (chemo)   • Cataract    • Emphysema of lung (HCC)    • Heart burn    • Hypertension    • Indigestion    • Metastatic melanoma (HCC) 1990 / 2012    original neck lesion resected 1990 / recurrent lesion resected 2007 / third lesion resected 2012 /recurrence in 2016   • Thyroid disease        PHYSICAL EXAM:    /84 (BP Location: Left arm, Patient Position: Sitting, BP Cuff Size: Adult)   Pulse 88   Ht 1.829 m (6' 0.01\")   Wt 107.3 kg (236 lb 9.6 oz)   SpO2 94%   BMI 32.08 kg/m²     Gen.   appears healthy     Skin   appropriate for sex and age    HEENT  unremarkable    Neck   no adenopathy    Heart  regular    Extremities  no edema    Neuro  gait and station normal    Psych  appropriate      ASSESSMENT AND RECOMMENDATIONS    1. Primary testicular hypogonadism              Feeling very well taking testosterone 100 mg IM every 10 days.  It helps keep his weight down and has good energy, stamina and outlook.    2. Adrenal insufficiency (HCC)           Doing very well taking hydrocortisone 20 mg a.m. and 10 mg p.m.           He discovered that he feels much better on this dose rather than 10 mg twice a day.  No sign of Cushing's.  I have changed his prescription to 20 mg twice daily so he has additional hydrocortisone to take in case of stress dosing.           We also discussed stress dosing            I gave him a form to get a medic alert mendelian    3. Polycythemia, secondary             Need to arrange a monitoring with the hospital infusion center to do hemograms monthly and phlebotomies as necessary.  - CBC WITHOUT DIFFERENTIAL; Future      DISPOSITION: Follow-up in 6 months      Paco Manrique M.D.    Copies to: Yon Madison M.D. [phone]  "

## 2020-01-21 DIAGNOSIS — E29.1 PRIMARY TESTICULAR HYPOGONADISM: ICD-10-CM

## 2020-01-21 RX ORDER — TESTOSTERONE CYPIONATE 200 MG/ML
100 VIAL (ML) INTRAMUSCULAR
Qty: 5 ML | Refills: 2 | Status: SHIPPED
Start: 2020-01-21 | End: 2020-01-31

## 2020-02-24 ENCOUNTER — TELEPHONE (OUTPATIENT)
Dept: ENDOCRINOLOGY | Facility: MEDICAL CENTER | Age: 70
End: 2020-02-24

## 2020-02-24 DIAGNOSIS — D75.1 POLYCYTHEMIA, SECONDARY: ICD-10-CM

## 2020-02-24 NOTE — TELEPHONE ENCOUNTER
Phone Number Called: [phone] (home)       Call outcome: Spoke to patient regarding message below.    Message: Spoke to patient to relay results. He has an apt for march 3rd for a theraputic phlebotomy. Would need new lab order placed for April.  And sent to [facility].

## 2020-02-24 NOTE — TELEPHONE ENCOUNTER
----- Message from Paco Manrique M.D. sent at 2/23/2020  2:54 PM PST -----  Sorry for the delay and noticing this report.  Sometimes when results come in from an outside facility I do not see them right away.  When that happens you can alert me that there is a blood test pending.    As you can see your polycythemia is too high and you need a phlebotomy.  I think you are set up at [facility].  If not let me know.    Do not take testosterone until you have had blood taken off.  Repeat your blood levels after the phlebotomy and again again in 1 month.

## 2020-03-24 RX ORDER — SYRINGE WITH NEEDLE, 1 ML 25GX5/8"
SYRINGE, EMPTY DISPOSABLE MISCELLANEOUS
Qty: 6 EACH | Refills: 2 | Status: SHIPPED | OUTPATIENT
Start: 2020-03-24 | End: 2021-11-01

## 2020-03-24 NOTE — TELEPHONE ENCOUNTER
"Received request via: Pharmacy    Was the patient seen in the last year in this department? Yes    Does the patient have an active prescription (recently filled or refills available) for medication(s) requested? No     B-D 3CC LUER-BO SYR 23GX1\" 23G X 1\" 3 ML Misc        Sig: use as directed      "

## 2020-05-10 ENCOUNTER — TELEPHONE (OUTPATIENT)
Dept: ENDOCRINOLOGY | Facility: MEDICAL CENTER | Age: 70
End: 2020-05-10

## 2020-05-10 NOTE — TELEPHONE ENCOUNTER
Telephone conversation with patient    Still has a tendency to polycythemia.  He had a phlebotomy after the last hemogram.  He will keep checking every month but I think we may have to lower his dose of testosterone.  He is just getting 100 mg every 10 days which is not a big dose.  I think I will be seeing him in June and we will talk about it then.    Paco Manrique M.D.

## 2020-05-13 ENCOUNTER — TELEPHONE (OUTPATIENT)
Dept: CARDIOLOGY | Facility: MEDICAL CENTER | Age: 70
End: 2020-05-13

## 2020-07-01 ENCOUNTER — APPOINTMENT (OUTPATIENT)
Dept: CARDIOLOGY | Facility: MEDICAL CENTER | Age: 70
End: 2020-07-01
Payer: MEDICARE

## 2020-07-20 ENCOUNTER — OFFICE VISIT (OUTPATIENT)
Dept: ENDOCRINOLOGY | Facility: MEDICAL CENTER | Age: 70
End: 2020-07-20
Attending: INTERNAL MEDICINE
Payer: MEDICARE

## 2020-07-20 VITALS
WEIGHT: 214 LBS | BODY MASS INDEX: 28.99 KG/M2 | SYSTOLIC BLOOD PRESSURE: 124 MMHG | HEIGHT: 72 IN | DIASTOLIC BLOOD PRESSURE: 78 MMHG | HEART RATE: 78 BPM | OXYGEN SATURATION: 94 %

## 2020-07-20 DIAGNOSIS — E28.0 ESTRADIOL EXCESS: ICD-10-CM

## 2020-07-20 DIAGNOSIS — E03.9 HYPOTHYROIDISM, UNSPECIFIED TYPE: ICD-10-CM

## 2020-07-20 DIAGNOSIS — D75.1 POLYCYTHEMIA, SECONDARY: ICD-10-CM

## 2020-07-20 DIAGNOSIS — E29.1 PRIMARY TESTICULAR HYPOGONADISM: ICD-10-CM

## 2020-07-20 LAB
BASOPHILS # BLD AUTO: 0.1 X10E3/UL (ref 0–0.2)
BASOPHILS NFR BLD AUTO: 0 %
EOSINOPHIL # BLD AUTO: 0.1 X10E3/UL (ref 0–0.4)
EOSINOPHIL NFR BLD AUTO: 1 %
ERYTHROCYTE [DISTWIDTH] IN BLOOD BY AUTOMATED COUNT: 13 % (ref 11.6–15.4)
HCT VFR BLD AUTO: 53.3 % (ref 37.5–51)
HGB BLD-MCNC: 17.1 G/DL (ref 13–17.7)
IMM GRANULOCYTES # BLD AUTO: 0 X10E3/UL (ref 0–0.1)
IMM GRANULOCYTES NFR BLD AUTO: 0 %
IMMATURE CELLS  115398: ABNORMAL
LYMPHOCYTES # BLD AUTO: 2.5 X10E3/UL (ref 0.7–3.1)
LYMPHOCYTES NFR BLD AUTO: 22 %
MCH RBC QN AUTO: 29.5 PG (ref 26.6–33)
MCHC RBC AUTO-ENTMCNC: 32.1 G/DL (ref 31.5–35.7)
MCV RBC AUTO: 92 FL (ref 79–97)
MONOCYTES # BLD AUTO: 1.2 X10E3/UL (ref 0.1–0.9)
MONOCYTES NFR BLD AUTO: 11 %
MORPHOLOGY BLD-IMP: ABNORMAL
NEUTROPHILS # BLD AUTO: 7.4 X10E3/UL (ref 1.4–7)
NEUTROPHILS NFR BLD AUTO: 66 %
NRBC BLD AUTO-RTO: ABNORMAL %
PLATELET # BLD AUTO: 150 X10E3/UL (ref 150–450)
RBC # BLD AUTO: 5.79 X10E6/UL (ref 4.14–5.8)
WBC # BLD AUTO: 11.3 X10E3/UL (ref 3.4–10.8)

## 2020-07-20 PROCEDURE — 99211 OFF/OP EST MAY X REQ PHY/QHP: CPT | Performed by: INTERNAL MEDICINE

## 2020-07-20 PROCEDURE — 99214 OFFICE O/P EST MOD 30 MIN: CPT | Performed by: INTERNAL MEDICINE

## 2020-07-21 NOTE — PROGRESS NOTES
"Chief Complaint   Patient presents with   • Hypogonadism     vs polycythemia        HPI:    We discussed  recurrent polycythemia again.  He sleeps with oxygen based on overnight oximetry home test.  He has not had a sleep apnea evaluation in a sleep lab.  This was the basis for him using oxygen at night.  I explained that his tendency to become polycythemic with a relatively small dose of testosterone implies that he has more hypoxia at night than he realizes.  He apparently does not seem to have sleep apnea according to his wife.  He usually sleeps well and wakes up rested.        He is reluctant to do the sleep lab evaluation I think because he really does not want to use CPAP.  The alternative might be to reduce his dose of testosterone from 100 mg down to 80 mg every 10 days and see what kind of levels we get.  He is in favor of that approach.    ROS:  I did caution him however if he really does have sleep apnea which is undertreated this could lead to more significant problems in the future.      Allergies:   Allergies   Allergen Reactions   • Sulfa Drugs      Severe muscle pains       Current medicines including changes today:  Current Outpatient Medications   Medication Sig Dispense Refill   • B-D 3CC LUER-BO SYR 23GX1\" 23G X 1\" 3 ML Misc use as directed 6 Each 2   • hydrocortisone (CORTEF) 10 MG Tab Take 2 Tabs by mouth 2 times a day. 120 Tab 5   • flecainide (TAMBOCOR) 100 MG Tab Take 1 Tab by mouth 2 times a day. 180 Tab 3   • valsartan-hydrochlorothiazide (DIOVAN-HCT) 80-12.5 MG per tablet Take  by mouth.     • PROAIR  (90 Base) MCG/ACT Aero Soln inhalation aerosol   0   • gentamicin (GARAMYCIN) 0.3 % Solution instill 1 drop into affected eye every 4 hours  0   • levothyroxine (SYNTHROID) 150 MCG Tab Take 1 Tab by mouth Every morning on an empty stomach. 90 Tab 3   • DILTIAZem CD (CARTIA XT) 120 MG CAPSULE SR 24 HR Take 1 Cap by mouth every day. 90 Cap 3   • felodipine (PLENDIL) 2.5 MG TABLET SR " 24 HR Take 1 Tab by mouth every day. 90 Tab 3   • ondansetron (ZOFRAN ODT) 4 MG TABLET DISPERSIBLE Take 4 mg by mouth every 6 hours as needed.  0   • HYDROcodone-acetaminophen (NORCO) 7.5-325 MG per tablet Take 1-2 Tabs by mouth every 8 hours as needed.     • Fluticasone-Umeclidin-Vilant (TRELEGY ELLIPTA) 100-62.5-25 MCG/INH AEROSOL POWDER, BREATH ACTIVATED Inhale 1 Puff by mouth every day. 2 Each 0   • tamsulosin (FLOMAX) 0.4 MG capsule Take 0.4 mg by mouth ONE-HALF HOUR AFTER BREAKFAST.     • simvastatin (ZOCOR) 20 MG TABS Take 20 mg by mouth every morning.     • omeprazole (PRILOSEC) 20 MG CPDR Take 20 mg by mouth every day.     • Multiple Vitamin (MULTIVITAMIN PO) Take  by mouth every day.       No current facility-administered medications for this visit.         Past Medical History:   Diagnosis Date   • Adrenal disorder (HCC)    • Arrhythmia    • Breath shortness    • Cancer (HCC)     melanoma in 2007, original 1989 from back of neck (chemo)   • Cataract    • Emphysema of lung (HCC)    • Heart burn    • Hypertension    • Indigestion    • Metastatic melanoma (HCC) 1990 / 2012    original neck lesion resected 1990 / recurrent lesion resected 2007 / third lesion resected 2012 /recurrence in 2016   • Thyroid disease        PHYSICAL EXAM:    /78 (BP Location: Left arm, Patient Position: Sitting, BP Cuff Size: Adult)   Pulse 78   Ht 1.829 m (6')   Wt 97.1 kg (214 lb)   SpO2 94%   BMI 29.02 kg/m²     Gen.   appears healthy    Skin   appropriate for sex and age    HEENT  unremarkable    Neck   thyroid gland is difficult to palpate    Heart  regular    Extremities  no edema    Neuro  gait and station normal    Psych  appropriate      ASSESSMENT AND RECOMMENDATIONS    1. Primary testicular hypogonadism              Question of appropriate replacement dose    2. Polycythemia, secondary          I believe this is a combination of testosterone supplementation and nocturnal hypoxia    3. Hypothyroidism,  unspecified type      4. Estradiol excess                      DISPOSITION: Reduce testosterone to 80 mg every 10 days and update lab in 1 month and further discuss.      Paco Manrique M.D.    Copies to: Emeka Vallecillo M.D. [phone]

## 2020-07-22 DIAGNOSIS — E03.9 HYPOTHYROIDISM, UNSPECIFIED TYPE: ICD-10-CM

## 2020-07-22 DIAGNOSIS — I10 ESSENTIAL HYPERTENSION: ICD-10-CM

## 2020-07-22 DIAGNOSIS — I48.91 ATRIAL FIBRILLATION WITH RAPID VENTRICULAR RESPONSE (HCC): ICD-10-CM

## 2020-07-22 RX ORDER — DILTIAZEM HYDROCHLORIDE 120 MG/1
CAPSULE, COATED, EXTENDED RELEASE ORAL
Qty: 90 CAP | Refills: 3 | Status: SHIPPED | OUTPATIENT
Start: 2020-07-22 | End: 2020-07-23 | Stop reason: SDUPTHER

## 2020-07-22 RX ORDER — LEVOTHYROXINE SODIUM 0.15 MG/1
TABLET ORAL
Qty: 90 TAB | Refills: 3 | Status: SHIPPED | OUTPATIENT
Start: 2020-07-22 | End: 2021-07-28

## 2020-07-22 NOTE — TELEPHONE ENCOUNTER
Received request via: Pharmacy    Was the patient seen in the last year in this department? Yes    Does the patient have an active prescription (recently filled or refills available) for medication(s) requested? No     LEVOTHYROXINE 150 MCG TABLET         Will file in chart as: levothyroxine (SYNTHROID) 150 MCG Tab        Sig: take 1 tablet by mouth once daily    Disp:  90 Tab    Refills:  3 (Pharmacy requested: Not specified)    Start: 7/22/2020    Class: Normal    For: Hypothyroidism, unspecified type    Last ordered: 1 year ago by Paco Manrique M.D.  Last refill: 4/30/2020    Rx #: [id number]

## 2020-07-23 ENCOUNTER — OFFICE VISIT (OUTPATIENT)
Dept: CARDIOLOGY | Facility: MEDICAL CENTER | Age: 70
End: 2020-07-23
Payer: MEDICARE

## 2020-07-23 VITALS
HEART RATE: 72 BPM | SYSTOLIC BLOOD PRESSURE: 136 MMHG | BODY MASS INDEX: 31.97 KG/M2 | DIASTOLIC BLOOD PRESSURE: 78 MMHG | HEIGHT: 72 IN | WEIGHT: 236 LBS | OXYGEN SATURATION: 93 % | RESPIRATION RATE: 16 BRPM

## 2020-07-23 DIAGNOSIS — Z72.0 TOBACCO ABUSE: ICD-10-CM

## 2020-07-23 DIAGNOSIS — I10 ESSENTIAL HYPERTENSION: ICD-10-CM

## 2020-07-23 DIAGNOSIS — D75.1 POLYCYTHEMIA, SECONDARY: ICD-10-CM

## 2020-07-23 DIAGNOSIS — I20.0 UNSTABLE ANGINA PECTORIS (HCC): ICD-10-CM

## 2020-07-23 DIAGNOSIS — Z79.899 HIGH RISK MEDICATION USE: ICD-10-CM

## 2020-07-23 DIAGNOSIS — I48.91 ATRIAL FIBRILLATION WITH RAPID VENTRICULAR RESPONSE (HCC): ICD-10-CM

## 2020-07-23 DIAGNOSIS — C43.9 METASTATIC MELANOMA (HCC): ICD-10-CM

## 2020-07-23 PROCEDURE — 99215 OFFICE O/P EST HI 40 MIN: CPT | Performed by: INTERNAL MEDICINE

## 2020-07-23 RX ORDER — FLECAINIDE ACETATE 100 MG/1
100 TABLET ORAL 2 TIMES DAILY
Qty: 180 TAB | Refills: 3 | Status: SHIPPED | OUTPATIENT
Start: 2020-07-23 | End: 2021-06-15 | Stop reason: SDUPTHER

## 2020-07-23 RX ORDER — DILTIAZEM HYDROCHLORIDE 120 MG/1
120 CAPSULE, COATED, EXTENDED RELEASE ORAL DAILY
Qty: 90 CAP | Refills: 3 | Status: SHIPPED | OUTPATIENT
Start: 2020-07-23 | End: 2021-06-15 | Stop reason: SDUPTHER

## 2020-07-23 ASSESSMENT — ENCOUNTER SYMPTOMS
NEUROLOGICAL NEGATIVE: 1
CHILLS: 0
CONSTITUTIONAL NEGATIVE: 1
MUSCULOSKELETAL NEGATIVE: 1
SHORTNESS OF BREATH: 0
DIZZINESS: 0
CLAUDICATION: 0
ORTHOPNEA: 0
PND: 0
SORE THROAT: 0
EYES NEGATIVE: 1
FEVER: 0
LOSS OF CONSCIOUSNESS: 0
SPUTUM PRODUCTION: 0
PALPITATIONS: 0
RESPIRATORY NEGATIVE: 1
GASTROINTESTINAL NEGATIVE: 1
CARDIOVASCULAR NEGATIVE: 1
WHEEZING: 0
COUGH: 0
BRUISES/BLEEDS EASILY: 0
STRIDOR: 0
WEAKNESS: 0
HEMOPTYSIS: 0

## 2020-07-23 NOTE — PROGRESS NOTES
Chief Complaint   Patient presents with   • Atrial Fibrillation       Subjective:   Maykel Joseph is a 70 y.o. male who presents today as a follow-up for his atrial fibrillation high risk medication use polycythemia.  Since he was last seen he continues to have occasional atrial fibrillation.  He is not on a blood thinner.  He has polycythemia.  We have not checked an ECG in 2 years.  His blood pressure is controlled.    Past Medical History:   Diagnosis Date   • Adrenal disorder (HCC)    • Arrhythmia    • Breath shortness    • Cancer (HCC)     melanoma in 2007, original 1989 from back of neck (chemo)   • Cataract    • Emphysema of lung (HCC)    • Heart burn    • Hypertension    • Indigestion    • Metastatic melanoma (HCC) 1990 / 2012    original neck lesion resected 1990 / recurrent lesion resected 2007 / third lesion resected 2012 /recurrence in 2016   • Thyroid disease      Past Surgical History:   Procedure Laterality Date   • CATARACT PHACO WITH IOL Left 11/6/2018    Procedure: CATARACT PHACO WITH IOL;  Surgeon: Shravan España M.D.;  Location: SURGERY SAME DAY [facility];  Service: Ophthalmology   • CATARACT PHACO WITH IOL Right 10/16/2018    Procedure: CATARACT PHACO WITH IOL;  Surgeon: Shravan España M.D.;  Location: SURGERY SAME DAY [facility];  Service: Ophthalmology   • COLONOSCOPY - ENDO  10/9/2016    Procedure: COLONOSCOPY - ENDO;  Surgeon: William Mitchell M.D.;  Location: SURGERY [facility];  Service:    • RECOVERY  7/26/2016    Procedure: CT-CT Guided abdominal mass-;  Surgeon: [facility];  Location: SURGERY PRE-POST PROC UNIT [facility];  Service:    • MASS EXCISION GENERAL  3/9/2012    Performed by MARLEN KEEN at SURGERY SAME DAY [facility] ORS   • OTHER      lymph nodes back of neck     Family History   Problem Relation Age of Onset   • Heart Disease Mother    • Cancer Father    • Heart Disease Maternal Grandmother    • Heart Disease Maternal Grandfather    • Cancer Paternal  Grandfather      Social History     Socioeconomic History   • Marital status:      Spouse name: Not on file   • Number of children: Not on file   • Years of education: Not on file   • Highest education level: Not on file   Occupational History   • Not on file   Social Needs   • Financial resource strain: Not on file   • Food insecurity     Worry: Not on file     Inability: Not on file   • Transportation needs     Medical: Not on file     Non-medical: Not on file   Tobacco Use   • Smoking status: Current Every Day Smoker     Packs/day: 0.50     Years: 40.00     Pack years: 20.00     Types: Cigarettes   • Smokeless tobacco: Never Used   • Tobacco comment: did vape, but has not quit 10/2019   Substance and Sexual Activity   • Alcohol use: Yes     Comment: 1 drink per month    • Drug use: No   • Sexual activity: Not on file   Lifestyle   • Physical activity     Days per week: Not on file     Minutes per session: Not on file   • Stress: Not on file   Relationships   • Social connections     Talks on phone: Not on file     Gets together: Not on file     Attends Bahai service: Not on file     Active member of club or organization: Not on file     Attends meetings of clubs or organizations: Not on file     Relationship status: Not on file   • Intimate partner violence     Fear of current or ex partner: Not on file     Emotionally abused: Not on file     Physically abused: Not on file     Forced sexual activity: Not on file   Other Topics Concern   • Not on file   Social History Narrative   • Not on file     Allergies   Allergen Reactions   • Sulfa Drugs      Severe muscle pains     Outpatient Encounter Medications as of 7/23/2020   Medication Sig Dispense Refill   • DILTIAZem CD (CARDIZEM CD) 120 MG CAPSULE SR 24 HR Take 1 Cap by mouth every day. 90 Cap 3   • apixaban (ELIQUIS) 5mg Tab Take 1 Tab by mouth 2 Times a Day. 60 Tab 11   • flecainide (TAMBOCOR) 100 MG Tab Take 1 Tab by mouth 2 times a day. 180 Tab 3  "  • levothyroxine (SYNTHROID) 150 MCG Tab take 1 tablet by mouth once daily 90 Tab 3   • B-D 3CC LUER-BO SYR 23GX1\" 23G X 1\" 3 ML Misc use as directed 6 Each 2   • hydrocortisone (CORTEF) 10 MG Tab Take 2 Tabs by mouth 2 times a day. 120 Tab 5   • valsartan-hydrochlorothiazide (DIOVAN-HCT) 80-12.5 MG per tablet Take  by mouth.     • PROAIR  (90 Base) MCG/ACT Aero Soln inhalation aerosol   0   • gentamicin (GARAMYCIN) 0.3 % Solution instill 1 drop into affected eye every 4 hours  0   • felodipine (PLENDIL) 2.5 MG TABLET SR 24 HR Take 1 Tab by mouth every day. 90 Tab 3   • ondansetron (ZOFRAN ODT) 4 MG TABLET DISPERSIBLE Take 4 mg by mouth every 6 hours as needed.  0   • HYDROcodone-acetaminophen (NORCO) 7.5-325 MG per tablet Take 1-2 Tabs by mouth every 8 hours as needed.     • Fluticasone-Umeclidin-Vilant (TRELEGY ELLIPTA) 100-62.5-25 MCG/INH AEROSOL POWDER, BREATH ACTIVATED Inhale 1 Puff by mouth every day. 2 Each 0   • tamsulosin (FLOMAX) 0.4 MG capsule Take 0.4 mg by mouth ONE-HALF HOUR AFTER BREAKFAST.     • simvastatin (ZOCOR) 20 MG TABS Take 20 mg by mouth every morning.     • omeprazole (PRILOSEC) 20 MG CPDR Take 20 mg by mouth every day.     • Multiple Vitamin (MULTIVITAMIN PO) Take  by mouth every day.     • [DISCONTINUED] DILTIAZem CD (CARDIZEM CD) 120 MG CAPSULE SR 24 HR take 1 capsule by mouth once daily 90 Cap 3   • [DISCONTINUED] flecainide (TAMBOCOR) 100 MG Tab Take 1 Tab by mouth 2 times a day. 180 Tab 3     No facility-administered encounter medications on file as of 7/23/2020.      Review of Systems   Constitutional: Negative.  Negative for chills, fever and malaise/fatigue.   HENT: Negative.  Negative for sore throat.    Eyes: Negative.    Respiratory: Negative.  Negative for cough, hemoptysis, sputum production, shortness of breath, wheezing and stridor.    Cardiovascular: Negative.  Negative for chest pain, palpitations, orthopnea, claudication, leg swelling and PND.   Gastrointestinal: " Negative.    Genitourinary: Negative.    Musculoskeletal: Negative.    Skin: Negative.    Neurological: Negative.  Negative for dizziness, loss of consciousness and weakness.   Endo/Heme/Allergies: Negative.  Does not bruise/bleed easily.   All other systems reviewed and are negative.       Objective:   /78 (BP Location: Right arm, Patient Position: Sitting, BP Cuff Size: Adult)   Pulse 72   Resp 16   Ht 1.829 m (6')   Wt 107 kg (236 lb)   SpO2 93%   BMI 32.01 kg/m²     Physical Exam   Constitutional: He appears well-developed and well-nourished. No distress.   HENT:   Head: Normocephalic and atraumatic.   Right Ear: External ear normal.   Left Ear: External ear normal.   Nose: Nose normal.   Mouth/Throat: No oropharyngeal exudate.   Eyes: Pupils are equal, round, and reactive to light. Conjunctivae and EOM are normal. Right eye exhibits no discharge. Left eye exhibits no discharge. No scleral icterus.   Neck: Neck supple. No JVD present.   Cardiovascular: Normal rate, regular rhythm and intact distal pulses. Exam reveals no gallop and no friction rub.   No murmur heard.  Pulmonary/Chest: Effort normal. No stridor. No respiratory distress. He has no wheezes. He has no rales. He exhibits no tenderness.   Abdominal: Soft. He exhibits no distension. There is no guarding.   Musculoskeletal: Normal range of motion.         General: No tenderness, deformity or edema.   Neurological: He is alert. He has normal reflexes. He displays normal reflexes. No cranial nerve deficit. He exhibits normal muscle tone. Coordination normal.   Skin: Skin is warm and dry. No rash noted. He is not diaphoretic. No erythema. No pallor.   Psychiatric: He has a normal mood and affect. His behavior is normal. Judgment and thought content normal.   Nursing note and vitals reviewed.    EKG: Dated 7/23/2018 personally reviewed interpreted myself showing normal sinus rhythm with a right bundle branch block.  QRS of 88 ms, unchanged from  prior.    Assessment:     1. Atrial fibrillation with rapid ventricular response (HCC)  EKG    EKG    DILTIAZem CD (CARDIZEM CD) 120 MG CAPSULE SR 24 HR    apixaban (ELIQUIS) 5mg Tab    flecainide (TAMBOCOR) 100 MG Tab   2. Essential hypertension  DILTIAZem CD (CARDIZEM CD) 120 MG CAPSULE SR 24 HR    apixaban (ELIQUIS) 5mg Tab   3. Unstable angina pectoris (HCC)  apixaban (ELIQUIS) 5mg Tab   4. Metastatic melanoma (HCC)  apixaban (ELIQUIS) 5mg Tab   5. Polycythemia, secondary     6. Tobacco abuse     7. High risk medication use         Medical Decision Making:  Today's Assessment / Status / Plan:     70-year-old male with paroxysmal atrial fibrillation polycythemia and high risk medication usage.  We discussed the risk benefits alternatives of starting oral anticoagulation.  Given his risk factor profile I like to start him on a blood thinner.  I refilled his diltiazem and flecainide.  He is otherwise doing well.  We can see him back in 1 year.

## 2020-07-25 DIAGNOSIS — E29.1 HYPOGONADISM IN MALE: ICD-10-CM

## 2020-07-27 RX ORDER — TESTOSTERONE CYPIONATE 200 MG/ML
INJECTION, SOLUTION INTRAMUSCULAR
Qty: 5 ML | Refills: 0 | Status: SHIPPED | OUTPATIENT
Start: 2020-07-27 | End: 2020-08-03

## 2020-08-02 DIAGNOSIS — E27.40 ADRENAL INSUFFICIENCY (HCC): ICD-10-CM

## 2020-08-02 DIAGNOSIS — E29.1 HYPOGONADISM IN MALE: ICD-10-CM

## 2020-08-03 RX ORDER — HYDROCORTISONE 10 MG/1
TABLET ORAL
Qty: 120 TAB | Refills: 5 | Status: SHIPPED | OUTPATIENT
Start: 2020-08-03 | End: 2020-08-06

## 2020-08-03 RX ORDER — TESTOSTERONE CYPIONATE 200 MG/ML
INJECTION, SOLUTION INTRAMUSCULAR
Qty: 5 ML | Refills: 0 | Status: SHIPPED | OUTPATIENT
Start: 2020-08-03 | End: 2020-08-06

## 2020-08-05 ENCOUNTER — HOSPITAL ENCOUNTER (OUTPATIENT)
Dept: RADIOLOGY | Facility: MEDICAL CENTER | Age: 70
End: 2020-08-05
Attending: INTERNAL MEDICINE
Payer: MEDICARE

## 2020-08-05 DIAGNOSIS — E27.40 ADRENAL INSUFFICIENCY (HCC): ICD-10-CM

## 2020-08-05 DIAGNOSIS — C43.4 MALIGNANT MELANOMA OF SKIN OF SCALP AND NECK (HCC): ICD-10-CM

## 2020-08-05 DIAGNOSIS — E29.1 HYPOGONADISM IN MALE: ICD-10-CM

## 2020-08-05 PROCEDURE — 71260 CT THORAX DX C+: CPT

## 2020-08-05 PROCEDURE — 700117 HCHG RX CONTRAST REV CODE 255: Performed by: INTERNAL MEDICINE

## 2020-08-05 RX ADMIN — IOHEXOL 25 ML: 240 INJECTION, SOLUTION INTRATHECAL; INTRAVASCULAR; INTRAVENOUS; ORAL at 13:10

## 2020-08-05 RX ADMIN — IOHEXOL 100 ML: 350 INJECTION, SOLUTION INTRAVENOUS at 14:15

## 2020-08-06 RX ORDER — TESTOSTERONE CYPIONATE 200 MG/ML
INJECTION, SOLUTION INTRAMUSCULAR
Qty: 5 ML | Refills: 1 | Status: SHIPPED | OUTPATIENT
Start: 2020-08-06 | End: 2020-11-14

## 2020-08-06 RX ORDER — HYDROCORTISONE 10 MG/1
TABLET ORAL
Qty: 120 TAB | Refills: 5 | Status: SHIPPED | OUTPATIENT
Start: 2020-08-06 | End: 2021-09-10

## 2020-09-03 DIAGNOSIS — E29.1 PRIMARY TESTICULAR HYPOGONADISM: ICD-10-CM

## 2020-09-03 DIAGNOSIS — E28.0 ESTRADIOL EXCESS: ICD-10-CM

## 2020-09-03 DIAGNOSIS — E03.9 HYPOTHYROIDISM, UNSPECIFIED TYPE: ICD-10-CM

## 2020-09-03 DIAGNOSIS — D75.1 POLYCYTHEMIA, SECONDARY: ICD-10-CM

## 2020-10-21 NOTE — TELEPHONE ENCOUNTER
"Have we ever prescribed this med? Yes.  If yes, what date? 10/14/2019    Last OV: 10/14/2019 - DR. GARVEY     Next OV: Per last note \"on a as need basis\"    DX: COPD    Medications: Trelegy  "

## 2020-10-26 ENCOUNTER — OFFICE VISIT (OUTPATIENT)
Dept: ENDOCRINOLOGY | Facility: MEDICAL CENTER | Age: 70
End: 2020-10-26
Attending: INTERNAL MEDICINE
Payer: MEDICARE

## 2020-10-26 VITALS
SYSTOLIC BLOOD PRESSURE: 118 MMHG | BODY MASS INDEX: 31.15 KG/M2 | OXYGEN SATURATION: 94 % | HEIGHT: 72 IN | HEART RATE: 79 BPM | WEIGHT: 230 LBS | DIASTOLIC BLOOD PRESSURE: 70 MMHG

## 2020-10-26 DIAGNOSIS — C43.9 METASTATIC MELANOMA (HCC): ICD-10-CM

## 2020-10-26 DIAGNOSIS — E28.0 ESTRADIOL EXCESS: ICD-10-CM

## 2020-10-26 DIAGNOSIS — E03.9 HYPOTHYROIDISM, UNSPECIFIED TYPE: ICD-10-CM

## 2020-10-26 DIAGNOSIS — E29.1 PRIMARY TESTICULAR HYPOGONADISM: ICD-10-CM

## 2020-10-26 PROCEDURE — 99213 OFFICE O/P EST LOW 20 MIN: CPT | Performed by: INTERNAL MEDICINE

## 2020-10-26 PROCEDURE — 99211 OFF/OP EST MAY X REQ PHY/QHP: CPT | Performed by: INTERNAL MEDICINE

## 2020-10-26 NOTE — PROGRESS NOTES
Chief Complaint   Patient presents with   • Hypopituitarism     Secondary to immunotherapy for malignant melanoma        HPI:          The original cutaneous melanoma was resected in 1990.. He was found to have recurrence in nodes in 2007 which again were resected. In 2012 adenopathy was found in his left neck which was also resected. 2016 he had a recurrence finding disease in the retroperitoneal area. At that time he was given therapy with Yervoy and Nivolimab. Around that time he was started on corticosteroids and has been on variable doses until we finally concluded that this was going to be permanent secondary adrenal insufficiency.            . He also has been diagnosed as having hypothyroidism and that has been treated currently with levothyroxine 150 µg per day.  I do not know the basis for that diagnosis.  He was on thyroid when I first evaluated him.         He was unable to wean off prednisone completely so he is now taking hydrocortisone 20 mg a.m. 10 mg p.m.          Also taking testosterone 80 mg IM weekly.  His hypogonadism is primary.  Prior to treatment his LH was elevated at 15.1. He has a tendency to polycythemia which I think is a result of testosterone plus sleep apnea.  He does not want to be evaluated for sleep apnea so we have reduced his testosterone down to 80 mg every 10 days.  He did have a phlebotomy recently and we have him set up for hemogram checks regularly    ROS:  Prostate status is followed by [facility] [place] and he has had a recent check including PSA      Allergies:   Allergies   Allergen Reactions   • Sulfa Drugs      Severe muscle pains       Current medicines including changes today:  Current Outpatient Medications   Medication Sig Dispense Refill   • TRELEGY ELLIPTA 100-62.5-25 MCG/INH AEROSOL POWDER, BREATH ACTIVATED INHALE 1 PUFF BY MOUTH EVERY DAY; RINSE MOUTH AFTER USE. 1 Each 0   • hydrocortisone (CORTEF) 10 MG Tab TAKE 2 TABLETS BY MOUTH TWICE DAILY 120 Tab 5   •  "testosterone cypionate (DEPO-TESTOSTERONE) 200 MG/ML Solution injection INJECT 0.5 ML IN THE MUSCLE EVERY 10 DAYS 5 mL 1   • DILTIAZem CD (CARDIZEM CD) 120 MG CAPSULE SR 24 HR Take 1 Cap by mouth every day. 90 Cap 3   • apixaban (ELIQUIS) 5mg Tab Take 1 Tab by mouth 2 Times a Day. 60 Tab 11   • flecainide (TAMBOCOR) 100 MG Tab Take 1 Tab by mouth 2 times a day. 180 Tab 3   • levothyroxine (SYNTHROID) 150 MCG Tab take 1 tablet by mouth once daily 90 Tab 3   • B-D 3CC LUER-BO SYR 23GX1\" 23G X 1\" 3 ML Misc use as directed 6 Each 2   • valsartan-hydrochlorothiazide (DIOVAN-HCT) 80-12.5 MG per tablet Take  by mouth.     • PROAIR  (90 Base) MCG/ACT Aero Soln inhalation aerosol   0   • gentamicin (GARAMYCIN) 0.3 % Solution instill 1 drop into affected eye every 4 hours  0   • felodipine (PLENDIL) 2.5 MG TABLET SR 24 HR Take 1 Tab by mouth every day. 90 Tab 3   • ondansetron (ZOFRAN ODT) 4 MG TABLET DISPERSIBLE Take 4 mg by mouth every 6 hours as needed.  0   • HYDROcodone-acetaminophen (NORCO) 7.5-325 MG per tablet Take 1-2 Tabs by mouth every 8 hours as needed.     • tamsulosin (FLOMAX) 0.4 MG capsule Take 0.4 mg by mouth ONE-HALF HOUR AFTER BREAKFAST.     • simvastatin (ZOCOR) 20 MG TABS Take 20 mg by mouth every morning.     • omeprazole (PRILOSEC) 20 MG CPDR Take 20 mg by mouth every day.     • Multiple Vitamin (MULTIVITAMIN PO) Take  by mouth every day.       No current facility-administered medications for this visit.         Past Medical History:   Diagnosis Date   • Adrenal disorder (HCC)    • Arrhythmia    • Breath shortness    • Cancer (HCC)     melanoma in 2007, original 1989 from back of neck (chemo)   • Cataract    • Emphysema of lung (HCC)    • Heart burn    • Hypertension    • Indigestion    • Metastatic melanoma (HCC) 1990 / 2012    original neck lesion resected 1990 / recurrent lesion resected 2007 / third lesion resected 2012 /recurrence in 2016   • Thyroid disease        PHYSICAL EXAM:    BP " 118/70 (BP Location: Left arm, Patient Position: Sitting, BP Cuff Size: Adult)   Pulse 79   Ht 1.829 m (6')   Wt 104.3 kg (230 lb)   SpO2 94%   BMI 31.19 kg/m²     Examination is limited due to COVID-19 restrictions.    Gen.   appears healthy     Skin   appropriate for sex and age    HEENT  unremarkable by inspection    Heart  regular    Extremities  no edema    Neuro  gait and station normal    Psych  appropriate    ASSESSMENT AND RECOMMENDATIONS              I am presuming he is endocrine deficiencies as a result of his immunotherapy for melanoma.  1. Primary testicular hypogonadism                    Taking IM testosterone 80 mg every 10 days.                     Lab results pending  2. Estradiol excess              3. Hypothyroidism, unspecified type               Currently taking levothyroxine 150 mcg/day  4. Metastatic melanoma (HCC)               Currently in remission.  His last scan was this past August and it was negative for any recurrence       DISPOSITION: Current lab has been drawn and results are pending.  We will follow those by phone or my chart              If stable return in 6 months      Paco Manrique M.D.    Copies to: Emeka Vallecillo M.D. [phone]                   Dr. Jt Agee                   [facility] [place]

## 2020-11-03 ENCOUNTER — TELEPHONE (OUTPATIENT)
Dept: CARDIOLOGY | Facility: MEDICAL CENTER | Age: 70
End: 2020-11-03

## 2020-11-03 NOTE — TELEPHONE ENCOUNTER
"RO    Hello, received [facility] report on 10/29 at 1:57pm stating \"Was put on Eliquis unable to take medications due to side effects.\"      Please call patient back at [phone].  Thank you!   "

## 2020-11-10 DIAGNOSIS — J44.9 CHRONIC OBSTRUCTIVE PULMONARY DISEASE, UNSPECIFIED COPD TYPE (HCC): ICD-10-CM

## 2020-11-10 NOTE — TELEPHONE ENCOUNTER
Patient requesting 1 refill for Trelegy. He has appointment with Dr. Musa on 12/18/2020 but will run out of it before then.  If possible please send to [facility] in [facility]

## 2020-11-10 NOTE — TELEPHONE ENCOUNTER
"Pt called back this morning. He had started taking Eliquis when prescribed and reported the following, \"After a week and a half, my afib was out of control, I felt lousy, I was bruising very easily, the afib was unreal.\" Pt reported experiencing palpitations and rapid heart beat. He felt incapacitated while experiencing these symptoms.    After about 2 weeks of stopping Eliquis, his symptoms cleared. Education provided regarding stroke risk. After stopping Eliquis, pt re-started ASA 81mg daily. He states he would prefer not to restart anticoagulation. If RO has recommendations, will call pt to let him know. Pt verbalized understanding, appreciative of information.  "

## 2020-11-11 NOTE — TELEPHONE ENCOUNTER
Boogie Ramirez M.D.  You 18 hours ago (4:04 PM)     Can we get him back into clinic. The blood thinner has nothing to do with feeling a fib and we need to talk about options. Thanks        Called pt, spoke with spouse Orinne. Scheduled pt for soonest available with [facility] 12/09/20 at 12:40p. Pt would prefer afternoon visits as he lives 3 hours away. Will request schedulers to place pt on call back list for any afternoon cancellations with RO and JC.    Pt's spouse confirmed date, time, and location of visit. Verbalized understanding and was appreciative. Staff message sent to schedulers.

## 2020-11-11 NOTE — TELEPHONE ENCOUNTER
Have we ever prescribed this med? Yes.  If yes, what date? 10/14/19    Last OV: 10/14/19    Next OV: 12/1820    DX: COPD    Medications: trelegy  patient has a December appointment with Dr Musa but his Trelegy prescription will run out before appt

## 2020-12-09 ENCOUNTER — OFFICE VISIT (OUTPATIENT)
Dept: CARDIOLOGY | Facility: MEDICAL CENTER | Age: 70
End: 2020-12-09
Payer: MEDICARE

## 2020-12-09 VITALS
OXYGEN SATURATION: 93 % | HEART RATE: 82 BPM | SYSTOLIC BLOOD PRESSURE: 120 MMHG | DIASTOLIC BLOOD PRESSURE: 80 MMHG | RESPIRATION RATE: 18 BRPM | BODY MASS INDEX: 31.77 KG/M2 | WEIGHT: 234.6 LBS | HEIGHT: 72 IN

## 2020-12-09 DIAGNOSIS — E78.5 DYSLIPIDEMIA: ICD-10-CM

## 2020-12-09 DIAGNOSIS — I48.0 PAF (PAROXYSMAL ATRIAL FIBRILLATION) (HCC): ICD-10-CM

## 2020-12-09 DIAGNOSIS — D75.1 POLYCYTHEMIA: ICD-10-CM

## 2020-12-09 DIAGNOSIS — I10 ESSENTIAL HYPERTENSION: ICD-10-CM

## 2020-12-09 DIAGNOSIS — Z79.899 HIGH RISK MEDICATION USE: ICD-10-CM

## 2020-12-09 DIAGNOSIS — Z72.0 TOBACCO ABUSE: ICD-10-CM

## 2020-12-09 LAB — EKG IMPRESSION: NORMAL

## 2020-12-09 PROCEDURE — 93000 ELECTROCARDIOGRAM COMPLETE: CPT | Performed by: INTERNAL MEDICINE

## 2020-12-09 PROCEDURE — 99214 OFFICE O/P EST MOD 30 MIN: CPT | Performed by: NURSE PRACTITIONER

## 2020-12-09 RX ORDER — TESTOSTERONE CYPIONATE 200 MG/ML
0.5 INJECTION, SOLUTION INTRAMUSCULAR
COMMUNITY
End: 2021-03-23 | Stop reason: SDUPTHER

## 2020-12-09 RX ORDER — TRIAMCINOLONE ACETONIDE 1 MG/G
1 CREAM TOPICAL PRN
COMMUNITY
End: 2021-12-16

## 2020-12-09 ASSESSMENT — ENCOUNTER SYMPTOMS
ORTHOPNEA: 0
CLAUDICATION: 0
SHORTNESS OF BREATH: 0
COUGH: 0
MYALGIAS: 0
ABDOMINAL PAIN: 0
DIZZINESS: 0
FEVER: 0
PND: 0
PALPITATIONS: 1

## 2020-12-09 NOTE — PROGRESS NOTES
Chief Complaint   Patient presents with   • Atrial Fibrillation       Subjective:   Maykel Joseph is a 70 y.o. male who presents today for follow-up on his A. Fib.    Patient of Dr. Ramirez.  He was last seen in clinic on 7/23/2020.  During that visit, patient was started on Eliquis 5 mg twice a day for his A. fib.  Patient reports he took that medication for a few days and started to have A. fib.  Patient reports he stopped that shortly thereafter.  He states he got busy and has not reported the intolerance until recently.    Otherwise, he feels well. Patient denies chest pain, shortness of breath, dizziness/lightheadedness, orthopnea, PND or Edema.     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    Patient does continue to smoke, he smokes between half a pack to 1 pack/day.  Not ready to quit.    Additonally, patient has the following medical problems:    -Hx Malignant Melanoma, received immunotherapy    -Patient reports paroxysmal A. fib which started while he was receiving immunotherapy for his malignant melanoma.    -Polycythemia     -Followed by endocrinology for hypogonadism, hypothyroidism    Past Medical History:   Diagnosis Date   • Adrenal disorder (HCC)    • Arrhythmia    • Breath shortness    • Cancer (HCC)     melanoma in 2007, original 1989 from back of neck (chemo)   • Cataract    • Emphysema of lung (HCC)    • Heart burn    • Hypertension    • Indigestion    • Metastatic melanoma (HCC) 1990 / 2012    original neck lesion resected 1990 / recurrent lesion resected 2007 / third lesion resected 2012 /recurrence in 2016   • Thyroid disease      Past Surgical History:   Procedure Laterality Date   • CATARACT PHACO WITH IOL Left 11/6/2018    Procedure: CATARACT PHACO WITH IOL;  Surgeon: Shravan España M.D.;  Location: SURGERY SAME DAY [facility];  Service: Ophthalmology   • CATARACT PHACO WITH IOL Right 10/16/2018    Procedure: CATARACT PHACO WITH IOL;  Surgeon: Shravan BYNUM  JENNIFER España;  Location: SURGERY SAME DAY [facility];  Service: Ophthalmology   • COLONOSCOPY - ENDO  10/9/2016    Procedure: COLONOSCOPY - ENDO;  Surgeon: William Mitchell M.D.;  Location: SURGERY [facility];  Service:    • RECOVERY  7/26/2016    Procedure: CT-CT Guided abdominal mass-;  Surgeon: Recoveryonly Surgery;  Location: SURGERY PRE-POST PROC UNIT [facility];  Service:    • MASS EXCISION GENERAL  3/9/2012    Performed by MARLEN KEEN at SURGERY SAME DAY [facility]   • OTHER      lymph nodes back of neck     Family History   Problem Relation Age of Onset   • Heart Disease Mother    • Cancer Father    • Heart Disease Maternal Grandmother    • Heart Disease Maternal Grandfather    • Cancer Paternal Grandfather      Social History     Socioeconomic History   • Marital status:      Spouse name: Not on file   • Number of children: Not on file   • Years of education: Not on file   • Highest education level: Not on file   Occupational History   • Not on file   Social Needs   • Financial resource strain: Not on file   • Food insecurity     Worry: Not on file     Inability: Not on file   • Transportation needs     Medical: Not on file     Non-medical: Not on file   Tobacco Use   • Smoking status: Current Every Day Smoker     Packs/day: 0.50     Years: 40.00     Pack years: 20.00     Types: Cigarettes   • Smokeless tobacco: Never Used   • Tobacco comment: did vape, but has not quit 10/2019, 1/2-1 PPD   Substance and Sexual Activity   • Alcohol use: Yes     Comment: 1 drink per month    • Drug use: No   • Sexual activity: Not on file   Lifestyle   • Physical activity     Days per week: Not on file     Minutes per session: Not on file   • Stress: Not on file   Relationships   • Social connections     Talks on phone: Not on file     Gets together: Not on file     Attends Anabaptism service: Not on file     Active member of club or organization: Not on file     Attends meetings of clubs or organizations:  "Not on file     Relationship status: Not on file   • Intimate partner violence     Fear of current or ex partner: Not on file     Emotionally abused: Not on file     Physically abused: Not on file     Forced sexual activity: Not on file   Other Topics Concern   • Not on file   Social History Narrative   • Not on file     Allergies   Allergen Reactions   • Sulfa Drugs      Severe muscle pains     Outpatient Encounter Medications as of 12/9/2020   Medication Sig Dispense Refill   • rivaroxaban (XARELTO) 20 MG Tab tablet Take 1 Tab by mouth with dinner. 30 Tab 11   • Fluticasone-Umeclidin-Vilant (TRELEGY ELLIPTA) 100-62.5-25 MCG/INH AEROSOL POWDER, BREATH ACTIVATED Inhale 100 mcg by mouth every day. 1 Each 0   • hydrocortisone (CORTEF) 10 MG Tab TAKE 2 TABLETS BY MOUTH TWICE DAILY 120 Tab 5   • DILTIAZem CD (CARDIZEM CD) 120 MG CAPSULE SR 24 HR Take 1 Cap by mouth every day. 90 Cap 3   • flecainide (TAMBOCOR) 100 MG Tab Take 1 Tab by mouth 2 times a day. 180 Tab 3   • levothyroxine (SYNTHROID) 150 MCG Tab take 1 tablet by mouth once daily 90 Tab 3   • B-D 3CC LUER-OB SYR 23GX1\" 23G X 1\" 3 ML Misc use as directed 6 Each 2   • PROAIR  (90 Base) MCG/ACT Aero Soln inhalation aerosol   0   • gentamicin (GARAMYCIN) 0.3 % Solution instill 1 drop into affected eye every 4 hours  0   • felodipine (PLENDIL) 2.5 MG TABLET SR 24 HR Take 1 Tab by mouth every day. 90 Tab 3   • ondansetron (ZOFRAN ODT) 4 MG TABLET DISPERSIBLE Take 4 mg by mouth every 6 hours as needed.  0   • HYDROcodone-acetaminophen (NORCO) 7.5-325 MG per tablet Take 1-2 Tabs by mouth every 8 hours as needed.     • tamsulosin (FLOMAX) 0.4 MG capsule Take 0.4 mg by mouth ONE-HALF HOUR AFTER BREAKFAST.     • simvastatin (ZOCOR) 20 MG TABS Take 20 mg by mouth every morning.     • omeprazole (PRILOSEC) 20 MG CPDR Take 20 mg by mouth every day.     • Multiple Vitamin (MULTIVITAMIN PO) Take  by mouth every day.     • testosterone cypionate (DEPO-TESTOSTERONE) 200 " MG/ML Solution injection Inject 0.5 mL into the shoulder, thigh, or buttocks every 10 days.     • triamcinolone acetonide (KENALOG) 0.1 % Cream Apply 1 Application topically as needed.     • [DISCONTINUED] apixaban (ELIQUIS) 5mg Tab Take 1 Tab by mouth 2 Times a Day. (Patient not taking: Reported on 12/9/2020) 60 Tab 11   • [DISCONTINUED] valsartan-hydrochlorothiazide (DIOVAN-HCT) 80-12.5 MG per tablet Take  by mouth.       No facility-administered encounter medications on file as of 12/9/2020.      Review of Systems   Constitutional: Negative for fever and malaise/fatigue.   Respiratory: Negative for cough and shortness of breath.    Cardiovascular: Positive for palpitations. Negative for chest pain, orthopnea, claudication, leg swelling and PND.   Gastrointestinal: Negative for abdominal pain.   Musculoskeletal: Negative for myalgias.   Neurological: Negative for dizziness.   All other systems reviewed and are negative.       Objective:   /80 (BP Location: Left arm, Patient Position: Sitting, BP Cuff Size: Adult)   Pulse 82   Resp 18   Ht 1.829 m (6')   Wt 106.4 kg (234 lb 9.6 oz)   SpO2 93%   BMI 31.82 kg/m²     Physical Exam   Constitutional: He is oriented to person, place, and time. He appears well-developed and well-nourished.   HENT:   Head: Normocephalic and atraumatic.   Eyes: Pupils are equal, round, and reactive to light. EOM are normal.   Neck: Normal range of motion. Neck supple. No JVD present.   Cardiovascular: Normal rate, regular rhythm and normal heart sounds.   Pulmonary/Chest: Effort normal and breath sounds normal. No respiratory distress. He has no wheezes. He has no rales.   Abdominal: Soft. Bowel sounds are normal.   Musculoskeletal:         General: No edema.   Neurological: He is alert and oriented to person, place, and time.   Skin: Skin is warm and dry.   Psychiatric: He has a normal mood and affect. His behavior is normal.   Vitals reviewed.    Lab Results   Component Value  Date/Time    CHOLSTRLTOT 211 (H) 12/05/2016 07:15 AM    LDL 70 12/05/2016 07:15 AM     12/05/2016 07:15 AM    TRIGLYCERIDE 163 (H) 12/05/2016 07:15 AM       Lab Results   Component Value Date/Time    SODIUM 147 (H) 01/02/2020 04:10 AM    SODIUM 141 09/11/2017 04:30 PM    POTASSIUM 4.6 01/02/2020 04:10 AM    POTASSIUM 3.7 09/11/2017 04:30 PM    CHLORIDE 107 (H) 01/02/2020 04:10 AM    CHLORIDE 110 09/11/2017 04:30 PM    CO2 23 01/02/2020 04:10 AM    CO2 24 09/11/2017 04:30 PM    GLUCOSE 75 01/02/2020 04:10 AM    GLUCOSE 106 (H) 09/11/2017 04:30 PM    BUN 13 01/02/2020 04:10 AM    BUN 10 09/11/2017 04:30 PM    CREATININE 1.07 01/02/2020 04:10 AM    CREATININE 0.85 09/11/2017 04:30 PM    CREATININE 1.0 03/07/2008 02:20 PM    BUNCREATRAT 12 01/02/2020 04:10 AM     Lab Results   Component Value Date/Time    ALKPHOSPHAT 109 (H) 09/11/2017 04:30 PM    ASTSGOT 26 09/11/2017 04:30 PM    ALTSGPT 19 09/11/2017 04:30 PM    TBILIRUBIN 0.6 09/11/2017 04:30 PM      Transthoracic Echo Report 10/8/2016  Technically difficult and incomplete study.   2 ml's of Optison contrast was used to enhance definition of the   endocardial borders.   Grade I diastolic dysfunction. Left ventricular ejection fraction is   visually estimated to be 55%.   Hypokinetic mid to apical lateral wall.  Trace mitral regurgitation.  Right ventricular systolic pressure is estimated to be 30 mmHg. Right atrial pressure is estimated to be 3 mmHg.  Ascending aorta borderline dilated is 3.9 cm.   No prior study is available for comparison.       Myocardial Perfusion Report 10/24/2016   NUCLEAR IMAGING INTERPRETATION   Normal myocardial perfusion with no ischemia.   Normal left ventricular wall motion.  LV ejection fraction = 55%.   ECG INTERPRETATION   Negative stress ECG for ischemia.     Assessment:     1. PAF (paroxysmal atrial fibrillation) (HCC)  EKG    EKG    Comp Metabolic Panel    Lipid Profile   2. Essential hypertension  Comp Metabolic Panel     Lipid Profile   3. High risk medication use  Comp Metabolic Panel    Lipid Profile   4. Tobacco abuse  Comp Metabolic Panel    Lipid Profile   5. Polycythemia  Comp Metabolic Panel    Lipid Profile   6. Dyslipidemia  Comp Metabolic Panel    Lipid Profile       Medical Decision Making:  Today's Assessment / Status / Plan:   1.  Paroxysmal A. fib: EKG today shows SR 67  -Patient willing to try different OAC, will start Xarelto 20 mg daily  -Discussed with patient unlikely Eliquis causing A. Fib  -If A. fib does return, will recommend event monitor  -Continue flecainide 100 mg twice a day  -[id number] VASc score is 2    2.  Hypertension: Stable  -Continue diltiazem 120 mg daily  -Continue felodipine 2.5 mg daily    3.  Dyslipidemia:  -We will request records from his last lipid panel  -Continue simvastatin 20 mg daily  -Encourage smoking cessation, patient not ready to quit.    CMP and Lipid panel in 6 months     FU in clinic in 6 months with Dr. Ramirez. Sooner if needed.    Patient verbalizes understanding and agrees with the plan of care.     PLEASE NOTE: This Note was created using voice recognition Software. I have made every reasonable attempt to correct obvious errors, but I expect that there are errors of grammar and possibly content that I did not discover before finalizing the note

## 2020-12-09 NOTE — PATIENT INSTRUCTIONS
Atrial Fibrillation  Atrial fibrillation is a type of irregular or rapid heartbeat (arrhythmia). In atrial fibrillation, the top part of the heart (atria) quivers in a chaotic pattern. This makes the heart unable to pump blood normally. Having atrial fibrillation can increase your risk for other health problems, such as:  · Blood can pool in the atria and form clots. If a clot travels to the brain, it can cause a stroke.  · The heart muscle may weaken from the irregular blood flow. This can cause heart failure.  Atrial fibrillation may start suddenly and stop on its own, or it may become a long-lasting problem.  What are the causes?  This condition is caused by some heart-related conditions or procedures, including:  · High blood pressure. This is the most common cause.  · Heart failure.  · Heart valve conditions.  · Inflammation of the sac that surrounds the heart (pericarditis).  · Heart surgery.  · Coronary artery disease.  · Certain heart rhythm disorders, such as Bradley-Parkinson-White syndrome.  Other causes include:  · Pneumonia.  · Obstructive sleep apnea.  · Lung cancer.  · Thyroid problems, especially if the thyroid is overactive (hyperthyroidism).  · Excessive alcohol or drug use.  Sometimes, the cause of this condition is not known.  What increases the risk?  This condition is more likely to develop in:  · Older people.  · People who smoke.  · People who have diabetes mellitus.  · People who are overweight (obese).  · Athletes who exercise vigorously.  · People who have a family history.  What are the signs or symptoms?  Symptoms of this condition include:  · A feeling that your heart is beating rapidly or irregularly.  · A feeling of discomfort or pain in your chest.  · Shortness of breath.  · Sudden light-headedness or weakness.  · Getting tired easily during exercise.  In some cases, there are no symptoms.  How is this diagnosed?  Your health care provider may be able to detect atrial fibrillation when  taking your pulse. If detected, this condition may be diagnosed with:  · Electrocardiogram (ECG).  · Ambulatory cardiac monitor. This device records your heartbeats for 24 hours or more.  · Transthoracic echocardiogram (TTE) to evaluate how blood flows through your heart.  · Transesophageal echocardiogram (ANNA) to view more detailed images of your heart.  · A stress test.  · Imaging tests, such as a CT scan or chest X-ray.  · Blood tests.  How is this treated?  This condition may be treated with:  · Medicines to slow down the heart rate or bring the heart's rhythm back to normal.  · Medicines to prevent blood clots from forming.  · Electrical cardioversion. This delivers a low-energy shock to the heart to reset its rhythm.  · Ablation. This procedure destroys the part of the heart tissue that sends abnormal signals.  · Left atrial appendage occlusion/excision. This seals off a common place in the atria where blood clots can form (left atrial appendage).  The goal of treatment is to prevent blood clots from forming and to keep your heart beating at a normal rate and rhythm. Treatment depends on underlying medical conditions and how you feel when you are experiencing fibrillation.  Follow these instructions at home:  Medicines  · Take over-the counter and prescription medicines only as told by your health care provider.  · If your health care provider prescribed a blood-thinning medicine (anticoagulant), take it exactly as told. Taking too much blood-thinning medicine can cause bleeding. Taking too little can enable a blood clot to form and travel to the brain, causing a stroke.  Lifestyle         · Do not use any products that contain nicotine or tobacco, such as cigarettes and e-cigarettes. If you need help quitting, ask your health care provider.  · Do not drink beverages that contain caffeine, such as coffee, soda, and tea.  · Follow diet instructions as told by your health care provider.  · Exercise regularly as  "told by your health care provider.  · Do not drink alcohol.  General instructions  · If you have obstructive sleep apnea, manage your condition as told by your health care provider.  · Maintain a healthy weight. Do not use diet pills unless your health care provider approves. Diet pills may make heart problems worse.  · Keep all follow-up visits as told by your health care provider. This is important.  Contact a health care provider if you:  · Notice a change in the rate, rhythm, or strength of your heartbeat.  · Are taking an anticoagulant and you notice increased bruising.  · Tire more easily when you exercise or exert yourself.  · Have a sudden change in weight.  Get help right away if you have:    · Chest pain, abdominal pain, sweating, or weakness.  · Difficulty breathing.  · Blood in your vomit, stool (feces), or urine.  · Any symptoms of a stroke. \"BE FAST\" is an easy way to remember the main warning signs of a stroke:  ? B - Balance. Signs are dizziness, sudden trouble walking, or loss of balance.  ? E - Eyes. Signs are trouble seeing or a sudden change in vision.  ? F - Face. Signs are sudden weakness or numbness of the face, or the face or eyelid drooping on one side.  ? A - Arms. Signs are weakness or numbness in an arm. This happens suddenly and usually on one side of the body.  ? S - Speech. Signs are sudden trouble speaking, slurred speech, or trouble understanding what people say.  ? T - Time. Time to call emergency services. Write down what time symptoms started.  · Other signs of a stroke, such as:  ? A sudden, severe headache with no known cause.  ? Nausea or vomiting.  ? Seizure.  These symptoms may represent a serious problem that is an emergency. Do not wait to see if the symptoms will go away. Get medical help right away. Call your local emergency services (911 in the [place] Do not drive yourself to the hospital.  Summary  · Atrial fibrillation is a type of irregular or rapid heartbeat " (arrhythmia).  · Symptoms include a feeling that your heart is beating fast or irregularly. In some cases, you may not have symptoms.  · The condition is treated with medicines to slow down the heart rate or bring the heart's rhythm back to normal. You may also need blood-thinning medicines to prevent blood clots.  · Get help right away if you have symptoms or signs of a stroke.  This information is not intended to replace advice given to you by your health care provider. Make sure you discuss any questions you have with your health care provider.  Document Released: 12/18/2006 Document Revised: 02/07/2019 Document Reviewed: 02/08/2019  [employer] Patient Education © 2020 [employer]      Rivaroxaban oral tablets  What is this medicine?  RIVAROXABAN (ri va LEEANNE a ban) is an anticoagulant (blood thinner). It is used to treat blood clots in the lungs or in the veins. It is also used to prevent blood clots in the lungs or in the veins. It is also used to lower the chance of stroke in people with a medical condition called atrial fibrillation.  This medicine may be used for other purposes; ask your health care provider or pharmacist if you have questions.  COMMON BRAND NAME(S): Xarelto, Xarelto Starter Pack  What should I tell my health care provider before I take this medicine?  They need to know if you have any of these conditions:  · antiphospholipid antibody syndrome  · artificial heart valve  · bleeding disorders  · bleeding in the brain  · blood in your stools (black or tarry stools) or if you have blood in your vomit  · history of blood clots  · history of stomach bleeding  · kidney disease  · liver disease  · low blood counts, like low white cell, platelet, or red cell counts  · recent or planned spinal or epidural procedure  · take medicines that treat or prevent blood clots  · an unusual or allergic reaction to rivaroxaban, other medicines, foods, dyes, or preservatives  · pregnant or trying to get  pregnant  · breast-feeding  How should I use this medicine?  Take this medicine by mouth with a glass of water. Follow the directions on the prescription label. Take your medicine at regular intervals. Do not take it more often than directed. Do not stop taking except on your doctor's advice. Stopping this medicine may increase your risk of a blood clot. Be sure to refill your prescription before you run out of medicine.  If you are taking this medicine after hip or knee replacement surgery, take it with or without food. If you are taking this medicine for atrial fibrillation, take it with your evening meal. If you are taking this medicine to treat blood clots, take it with food at the same time each day. If you are unable to swallow your tablet, you may crush the tablet and mix it in applesauce. Then, immediately eat the applesauce. You should eat more food right after you eat the applesauce containing the crushed tablet.  Talk to your pediatrician regarding the use of this medicine in children. Special care may be needed.  Overdosage: If you think you have taken too much of this medicine contact a poison control center or emergency room at once.  NOTE: This medicine is only for you. Do not share this medicine with others.  What if I miss a dose?  If you take your medicine once a day and miss a dose, take the missed dose as soon as you remember. If it is almost time for your next dose, take only that dose. Do not take double or extra doses.  If you take your medicine twice a day and miss a dose, take the missed dose immediately. In this instance, 2 tablets may be taken at the same time. The next day you should take 1 tablet twice a day as directed.  What may interact with this medicine?  Do not take this medicine with any of the following medications:  · defibrotide  This medicine may also interact with the following medications:  · aspirin and aspirin-like medicines  · certain antibiotics like erythromycin,  azithromycin, and clarithromycin  · certain medicines for fungal infections like ketoconazole and itraconazole  · certain medicines for irregular heart beat like amiodarone, quinidine, dronedarone  · certain medicines for seizures like carbamazepine, phenytoin  · certain medicines that treat or prevent blood clots like warfarin, enoxaparin, and dalteparin  · conivaptan  · felodipine  · indinavir  · lopinavir; ritonavir  · NSAIDS, medicines for pain and inflammation, like ibuprofen or naproxen  · ranolazine  · rifampin  · ritonavir  · SNRIs, medicines for depression, like desvenlafaxine, duloxetine, levomilnacipran, venlafaxine  · SSRIs, medicines for depression, like citalopram, escitalopram, fluoxetine, fluvoxamine, paroxetine, sertraline  · Ovidio's wort  · verapamil  This list may not describe all possible interactions. Give your health care provider a list of all the medicines, herbs, non-prescription drugs, or dietary supplements you use. Also tell them if you smoke, drink alcohol, or use illegal drugs. Some items may interact with your medicine.  What should I watch for while using this medicine?  Visit your healthcare professional for regular checks on your progress. You may need blood work done while you are taking this medicine. Your condition will be monitored carefully while you are receiving this medicine. It is important not to miss any appointments.  Avoid sports and activities that might cause injury while you are using this medicine. Severe falls or injuries can cause unseen bleeding. Be careful when using sharp tools or knives. Consider using an electric razor. Take special care brushing or flossing your teeth. Report any injuries, bruising, or red spots on the skin to your healthcare professional.  If you are going to need surgery or other procedure, tell your healthcare professional that you are taking this medicine.  Wear a medical ID bracelet or chain. Carry a card that describes your disease  and details of your medicine and dosage times.  What side effects may I notice from receiving this medicine?  Side effects that you should report to your doctor or health care professional as soon as possible:  · allergic reactions like skin rash, itching or hives, swelling of the face, lips, or tongue  · back pain  · redness, blistering, peeling or loosening of the skin, including inside the mouth  · signs and symptoms of bleeding such as bloody or black, tarry stools; red or dark-brown urine; spitting up blood or brown material that looks like coffee grounds; red spots on the skin; unusual bruising or bleeding from the eye, gums, or nose  · signs and symptoms of a blood clot such as chest pain; shortness of breath; pain, swelling, or warmth in the leg  · signs and symptoms of a stroke such as changes in vision; confusion; trouble speaking or understanding; severe headaches; sudden numbness or weakness of the face, arm or leg; trouble walking; dizziness; loss of coordination  Side effects that usually do not require medical attention (report to your doctor or health care professional if they continue or are bothersome):  · dizziness  · muscle pain  This list may not describe all possible side effects. Call your doctor for medical advice about side effects. You may report side effects to FDA at 5-931-FDA-5001.  Where should I keep my medicine?  Keep out of the reach of children.  Store at room temperature between 15 and 30 degrees C (59 and 86 degrees F). Throw away any unused medicine after the expiration date.  NOTE: This sheet is a summary. It may not cover all possible information. If you have questions about this medicine, talk to your doctor, pharmacist, or health care provider.  © 2020 [employer]vier[employer] (2020-03-16 09:45:59)

## 2020-12-18 ENCOUNTER — OFFICE VISIT (OUTPATIENT)
Dept: SLEEP MEDICINE | Facility: MEDICAL CENTER | Age: 70
End: 2020-12-18
Payer: MEDICARE

## 2020-12-18 VITALS
BODY MASS INDEX: 31.69 KG/M2 | HEART RATE: 72 BPM | HEIGHT: 72 IN | OXYGEN SATURATION: 91 % | SYSTOLIC BLOOD PRESSURE: 120 MMHG | RESPIRATION RATE: 16 BRPM | WEIGHT: 234 LBS | DIASTOLIC BLOOD PRESSURE: 74 MMHG

## 2020-12-18 DIAGNOSIS — Z72.0 TOBACCO USE: ICD-10-CM

## 2020-12-18 DIAGNOSIS — E66.9 CLASS 1 OBESITY WITH BODY MASS INDEX (BMI) OF 31.0 TO 31.9 IN ADULT, UNSPECIFIED OBESITY TYPE, UNSPECIFIED WHETHER SERIOUS COMORBIDITY PRESENT: ICD-10-CM

## 2020-12-18 DIAGNOSIS — G47.34 NOCTURNAL HYPOXIA: ICD-10-CM

## 2020-12-18 DIAGNOSIS — C43.9 METASTATIC MELANOMA (HCC): ICD-10-CM

## 2020-12-18 DIAGNOSIS — J44.9 CHRONIC OBSTRUCTIVE PULMONARY DISEASE, UNSPECIFIED COPD TYPE (HCC): ICD-10-CM

## 2020-12-18 PROCEDURE — 99213 OFFICE O/P EST LOW 20 MIN: CPT | Performed by: INTERNAL MEDICINE

## 2020-12-18 ASSESSMENT — ENCOUNTER SYMPTOMS
PHOTOPHOBIA: 0
FOCAL WEAKNESS: 0
STRIDOR: 0
SINUS PAIN: 0
COUGH: 0
EYE REDNESS: 0
EYE PAIN: 0
BLURRED VISION: 0
NECK PAIN: 0
DIARRHEA: 0
PND: 0
DOUBLE VISION: 0
EYE DISCHARGE: 0
PALPITATIONS: 0
WHEEZING: 0
DIZZINESS: 0
ORTHOPNEA: 0
CLAUDICATION: 0
HEMOPTYSIS: 0
SORE THROAT: 0
WEIGHT LOSS: 0
VOMITING: 0
MYALGIAS: 0
DIAPHORESIS: 0
HEADACHES: 0
ABDOMINAL PAIN: 0
DEPRESSION: 0
BACK PAIN: 0
SPEECH CHANGE: 0
CHILLS: 0
NAUSEA: 0
CONSTIPATION: 0
SPUTUM PRODUCTION: 0
FALLS: 0
TREMORS: 0
FEVER: 0
WEAKNESS: 0
SHORTNESS OF BREATH: 0
HEARTBURN: 0

## 2020-12-18 NOTE — PROGRESS NOTES
Chief Complaint   Patient presents with   • Follow-Up     metastatic melanoma //Last Seen 10/14/19          HPI: This patient is a 70 y.o. male whom is followed in our clinic for COPD last seen by Dr. Musa on 10/14/2019.  The patient is an active tobacco user with greater than 40-pack-year history who was referred to us after onset of difficulty breathing with associated cough and shortness of breath around the time he was undergoing immunologic therapy for metastatic melanoma.  Pulmonary function testing from 2018 showed FEV1 of 2.42 L or 72% predicted with FEV1/FVC ratio 52.  He did have significant bronchodilator improvement and DLCO was low normal at 80% predicted.  He does use supplemental oxygen at 2 L/min at night but has never required treatment for acute COPD exacerbation has no symptoms of obstructive sleep apnea.  He did have an improvement in shortness of breath and cough when started on Trelegy which he has continued for the past year in addition to short acting bronchodilators which he has used at most twice in the past year.  He does continue to smoke roughly 1/2 pack/day.  He is monitored with surveillance imaging with oncology for his history of metastatic melanoma and most recent imaging from August showed no evidence for recurrence, some peripheral interlobular septal thickening with groundglass opacity in the lingula and right lower lobe similar to CT chest from November 2019.  No concerning nodules.  No lymphadenopathy.  Patient presents today for routine follow-up.  He has no acute complaints.  He is up-to-date on vaccines.    Past Medical History:   Diagnosis Date   • Adrenal disorder (HCC)    • Arrhythmia    • Breath shortness    • Cancer (HCC)     melanoma in 2007, original 1989 from back of neck (chemo)   • Cataract    • Emphysema of lung (HCC)    • Heart burn    • Hypertension    • Indigestion    • Metastatic melanoma (HCC) 1990 / 2012    original neck lesion resected 1990 / recurrent  lesion resected 2007 / third lesion resected 2012 /recurrence in 2016   • Thyroid disease        Social History     Socioeconomic History   • Marital status:      Spouse name: Not on file   • Number of children: Not on file   • Years of education: Not on file   • Highest education level: Not on file   Occupational History   • Not on file   Social Needs   • Financial resource strain: Not on file   • Food insecurity     Worry: Not on file     Inability: Not on file   • Transportation needs     Medical: Not on file     Non-medical: Not on file   Tobacco Use   • Smoking status: Current Every Day Smoker     Packs/day: 0.50     Years: 40.00     Pack years: 20.00     Types: Cigarettes   • Smokeless tobacco: Never Used   • Tobacco comment: did vape, but has not quit 10/2019, 1/2-1 PPD   Substance and Sexual Activity   • Alcohol use: Yes     Comment: 1 drink per month    • Drug use: No   • Sexual activity: Not on file   Lifestyle   • Physical activity     Days per week: Not on file     Minutes per session: Not on file   • Stress: Not on file   Relationships   • Social connections     Talks on phone: Not on file     Gets together: Not on file     Attends [employer] service: Not on file     Active member of club or organization: Not on file     Attends meetings of clubs or organizations: Not on file     Relationship status: Not on file   • Intimate partner violence     Fear of current or ex partner: Not on file     Emotionally abused: Not on file     Physically abused: Not on file     Forced sexual activity: Not on file   Other Topics Concern   • Not on file   Social History Narrative   • Not on file       Family History   Problem Relation Age of Onset   • Heart Disease Mother    • Cancer Father    • Heart Disease Maternal Grandmother    • Heart Disease Maternal Grandfather    • Cancer Paternal Grandfather        Current Outpatient Medications on File Prior to Visit   Medication Sig Dispense Refill   • testosterone  "cypionate (DEPO-TESTOSTERONE) 200 MG/ML Solution injection Inject 0.5 mL into the shoulder, thigh, or buttocks every 10 days.     • triamcinolone acetonide (KENALOG) 0.1 % Cream Apply 1 Application topically as needed.     • rivaroxaban (XARELTO) 20 MG Tab tablet Take 1 Tab by mouth with dinner. 30 Tab 11   • hydrocortisone (CORTEF) 10 MG Tab TAKE 2 TABLETS BY MOUTH TWICE DAILY 120 Tab 5   • DILTIAZem CD (CARDIZEM CD) 120 MG CAPSULE SR 24 HR Take 1 Cap by mouth every day. 90 Cap 3   • flecainide (TAMBOCOR) 100 MG Tab Take 1 Tab by mouth 2 times a day. 180 Tab 3   • levothyroxine (SYNTHROID) 150 MCG Tab take 1 tablet by mouth once daily 90 Tab 3   • B-D 3CC LUER-BO SYR 23GX1\" 23G X 1\" 3 ML Misc use as directed 6 Each 2   • PROAIR  (90 Base) MCG/ACT Aero Soln inhalation aerosol   0   • gentamicin (GARAMYCIN) 0.3 % Solution instill 1 drop into affected eye every 4 hours  0   • felodipine (PLENDIL) 2.5 MG TABLET SR 24 HR Take 1 Tab by mouth every day. 90 Tab 3   • ondansetron (ZOFRAN ODT) 4 MG TABLET DISPERSIBLE Take 4 mg by mouth every 6 hours as needed.  0   • HYDROcodone-acetaminophen (NORCO) 7.5-325 MG per tablet Take 1-2 Tabs by mouth every 8 hours as needed.     • tamsulosin (FLOMAX) 0.4 MG capsule Take 0.4 mg by mouth ONE-HALF HOUR AFTER BREAKFAST.     • simvastatin (ZOCOR) 20 MG TABS Take 20 mg by mouth every morning.     • omeprazole (PRILOSEC) 20 MG CPDR Take 20 mg by mouth every day.     • Multiple Vitamin (MULTIVITAMIN PO) Take  by mouth every day.       No current facility-administered medications on file prior to visit.        Sulfa drugs      ROS:   Review of Systems   Constitutional: Negative for chills, diaphoresis, fever, malaise/fatigue and weight loss.   HENT: Negative for congestion, ear discharge, ear pain, hearing loss, nosebleeds, sinus pain, sore throat and tinnitus.    Eyes: Negative for blurred vision, double vision, photophobia, pain, discharge and redness.   Respiratory: Negative for " cough, hemoptysis, sputum production, shortness of breath, wheezing and stridor.    Cardiovascular: Negative for chest pain, palpitations, orthopnea, claudication, leg swelling and PND.   Gastrointestinal: Negative for abdominal pain, constipation, diarrhea, heartburn, nausea and vomiting.   Genitourinary: Negative for dysuria and urgency.   Musculoskeletal: Negative for back pain, falls, joint pain, myalgias and neck pain.   Skin: Negative for itching and rash.   Neurological: Negative for dizziness, tremors, speech change, focal weakness, weakness and headaches.   Endo/Heme/Allergies: Negative for environmental allergies.   Psychiatric/Behavioral: Negative for depression.       /74 (BP Location: Left arm, Patient Position: Sitting, BP Cuff Size: Adult)   Pulse 72   Resp 16   Ht 1.829 m (6')   Wt 106.1 kg (234 lb)   SpO2 91%   Physical Exam  Vitals signs reviewed.   Constitutional:       General: He is not in acute distress.     Appearance: Normal appearance. He is normal weight.   HENT:      Head: Normocephalic and atraumatic.      Right Ear: External ear normal.      Left Ear: External ear normal.      Nose: Nose normal. No congestion.      Mouth/Throat:      Mouth: Mucous membranes are moist.      Pharynx: Oropharynx is clear. No oropharyngeal exudate.   Eyes:      General: No scleral icterus.     Extraocular Movements: Extraocular movements intact.      Conjunctiva/sclera: Conjunctivae normal.      Pupils: Pupils are equal, round, and reactive to light.   Neck:      Musculoskeletal: Normal range of motion and neck supple.   Cardiovascular:      Rate and Rhythm: Normal rate and regular rhythm.      Heart sounds: Normal heart sounds. No murmur. No gallop.    Pulmonary:      Effort: Pulmonary effort is normal. No respiratory distress.      Breath sounds: Normal breath sounds. No wheezing or rales.   Abdominal:      General: There is no distension.      Palpations: Abdomen is soft.   Musculoskeletal:  Normal range of motion.      Right lower leg: No edema.      Left lower leg: No edema.   Skin:     General: Skin is warm and dry.      Findings: No rash.   Neurological:      Mental Status: He is alert and oriented to person, place, and time.      Cranial Nerves: No cranial nerve deficit.   Psychiatric:         Mood and Affect: Mood normal.         Behavior: Behavior normal.         PFTs as reviewed by me personally: As per HPI    Imaging as reviewed by me personally: As per HPI    Assessment:  1. Chronic obstructive pulmonary disease, unspecified COPD type (HCC)  PULMONARY FUNCTION TESTS -Test requested: Complete Pulmonary Function Test    Fluticasone-Umeclidin-Vilant (TRELEGY ELLIPTA) 100-62.5-25 MCG/INH AEROSOL POWDER, BREATH ACTIVATED   2. Tobacco use     3. Metastatic melanoma (HCC)     4. Class 1 obesity with body mass index (BMI) of 31.0 to 31.9 in adult, unspecified obesity type, unspecified whether serious comorbidity present     5. Nocturnal hypoxia         Plan:  1.  This is mild symptomatically and mild to moderate based on pulmonary function testing.  Symptoms are currently well controlled on Trelegy and short acting bronchodilators as needed.  He does continue to smoke and he is aware that this puts him at risk for a more rapid decline in lung function.  He is up-to-date on vaccines.  He was counseled on tobacco cessation.  We will continue Trelegy and short acting bronchodilators.  I will see him back in 1 year with updated pulmonary function testing at that time.  2.  Patient is actively smoking and he was counseled on tobacco cessation.  He is not interested in any pharmacologic therapy at this time.  He is undergoing surveillance imaging with oncology for his metastatic melanoma which we can view as lung cancer screening as well.  3.  Status post immunologic therapy with good response and followed closely by oncology.  No evidence for recurrence on imaging from August.  Patient will follow up with  oncology and repeat imaging will be done in 1 year.  4.  This test with patient increased risk for obesity related pulmonary complications.  Encouraged healthy lifestyle habits.  5.  Chronic and presumed secondary to COPD.  No clinical evidence for obstructive sleep apnea.  Continue supplemental oxygen at night at 2 L/min.    Return in about 1 year (around 12/18/2021) for PFTs .

## 2021-03-23 DIAGNOSIS — E29.1 PRIMARY TESTICULAR HYPOGONADISM: ICD-10-CM

## 2021-03-23 RX ORDER — TESTOSTERONE CYPIONATE 200 MG/ML
100 INJECTION, SOLUTION INTRAMUSCULAR
Qty: 1.96 ML | Refills: 0 | Status: SHIPPED | OUTPATIENT
Start: 2021-03-23 | End: 2021-04-22

## 2021-04-14 DIAGNOSIS — E03.9 HYPOTHYROIDISM, UNSPECIFIED TYPE: ICD-10-CM

## 2021-04-26 ENCOUNTER — OFFICE VISIT (OUTPATIENT)
Dept: ENDOCRINOLOGY | Facility: MEDICAL CENTER | Age: 71
End: 2021-04-26
Attending: INTERNAL MEDICINE
Payer: MEDICARE

## 2021-04-26 VITALS
OXYGEN SATURATION: 91 % | SYSTOLIC BLOOD PRESSURE: 132 MMHG | HEART RATE: 79 BPM | BODY MASS INDEX: 31.69 KG/M2 | DIASTOLIC BLOOD PRESSURE: 84 MMHG | HEIGHT: 72 IN | WEIGHT: 234 LBS

## 2021-04-26 DIAGNOSIS — E27.40 ADRENAL INSUFFICIENCY (HCC): ICD-10-CM

## 2021-04-26 DIAGNOSIS — E03.9 HYPOTHYROIDISM, UNSPECIFIED TYPE: ICD-10-CM

## 2021-04-26 DIAGNOSIS — D75.1 POLYCYTHEMIA, SECONDARY: ICD-10-CM

## 2021-04-26 DIAGNOSIS — R73.03 PREDIABETES: ICD-10-CM

## 2021-04-26 DIAGNOSIS — E29.1 PRIMARY TESTICULAR HYPOGONADISM: ICD-10-CM

## 2021-04-26 PROCEDURE — 99211 OFF/OP EST MAY X REQ PHY/QHP: CPT | Performed by: INTERNAL MEDICINE

## 2021-04-26 PROCEDURE — 99214 OFFICE O/P EST MOD 30 MIN: CPT | Performed by: INTERNAL MEDICINE

## 2021-04-26 RX ORDER — TESTOSTERONE CYPIONATE 200 MG/ML
INJECTION, SOLUTION INTRAMUSCULAR
COMMUNITY
Start: 2021-04-24 | End: 2021-11-01

## 2021-04-27 NOTE — PROGRESS NOTES
"Chief Complaint   Patient presents with   • Follow-Up     Multiple endocrine gland failure        HPI:    See assessment and recommendations below    ROS:  All other systems reported as negative or unchanged since last exam      Allergies:   Allergies   Allergen Reactions   • Sulfa Drugs      Severe muscle pains       Current medicines including changes today:  Current Outpatient Medications   Medication Sig Dispense Refill   • testosterone cypionate (DEPO-TESTOSTERONE) 200 MG/ML Solution injection INJECT 0.5 ML IN THE MUSCLE EVERY 10 DAYS     • Fluticasone-Umeclidin-Vilant (TRELEGY ELLIPTA) 100-62.5-25 MCG/INH AEROSOL POWDER, BREATH ACTIVATED Inhale 100 mcg every day. 1 Each 11   • triamcinolone acetonide (KENALOG) 0.1 % Cream Apply 1 Application topically as needed.     • rivaroxaban (XARELTO) 20 MG Tab tablet Take 1 Tab by mouth with dinner. 30 Tab 11   • hydrocortisone (CORTEF) 10 MG Tab TAKE 2 TABLETS BY MOUTH TWICE DAILY 120 Tab 5   • DILTIAZem CD (CARDIZEM CD) 120 MG CAPSULE SR 24 HR Take 1 Cap by mouth every day. 90 Cap 3   • flecainide (TAMBOCOR) 100 MG Tab Take 1 Tab by mouth 2 times a day. 180 Tab 3   • levothyroxine (SYNTHROID) 150 MCG Tab take 1 tablet by mouth once daily 90 Tab 3   • B-D 3CC LUER-BO SYR 23GX1\" 23G X 1\" 3 ML Misc use as directed 6 Each 2   • PROAIR  (90 Base) MCG/ACT Aero Soln inhalation aerosol   0   • gentamicin (GARAMYCIN) 0.3 % Solution instill 1 drop into affected eye every 4 hours  0   • felodipine (PLENDIL) 2.5 MG TABLET SR 24 HR Take 1 Tab by mouth every day. 90 Tab 3   • ondansetron (ZOFRAN ODT) 4 MG TABLET DISPERSIBLE Take 4 mg by mouth every 6 hours as needed.  0   • HYDROcodone-acetaminophen (NORCO) 7.5-325 MG per tablet Take 1-2 Tabs by mouth every 8 hours as needed.     • tamsulosin (FLOMAX) 0.4 MG capsule Take 0.4 mg by mouth ONE-HALF HOUR AFTER BREAKFAST.     • simvastatin (ZOCOR) 20 MG TABS Take 20 mg by mouth every morning.     • omeprazole (PRILOSEC) 20 MG " CPDR Take 20 mg by mouth every day.     • Multiple Vitamin (MULTIVITAMIN PO) Take  by mouth every day.       No current facility-administered medications for this visit.        Past Medical History:   Diagnosis Date   • Adrenal disorder (HCC)    • Arrhythmia    • Breath shortness    • Cancer (HCC)     melanoma in 2007, original 1989 from back of neck (chemo)   • Cataract    • Emphysema of lung (HCC)    • Heart burn    • Hypertension    • Indigestion    • Metastatic melanoma (HCC) 1990 / 2012    original neck lesion resected 1990 / recurrent lesion resected 2007 / third lesion resected 2012 /recurrence in 2016   • Thyroid disease        PHYSICAL EXAM:    /84 (BP Location: Left arm, Patient Position: Sitting, BP Cuff Size: Adult)   Pulse 79   Ht 1.829 m (6')   Wt 106 kg (234 lb)   SpO2 91%   BMI 31.74 kg/m²     Gen. obese but otherwise appears healthy     Skin   appropriate for sex and age    HEENT  unremarkable    Neck   no adenopathy, no palpable thyroid    Breasts    no tenderness or gynecomastia    Heart  regular    Extremities  no edema    Neuro  gait and station normal    Psych  appropriate, calm, pleasant      ASSESSMENT AND RECOMMENDATIONS    1. Primary testicular hypogonadism            His hypogonadism was associated with an elevated LH so this is not a pituitary failure.  Testosterone replacement is satisfactory with IM testosterone 80 mg every 10 days.           This is complicated by recurrent polycythemia presumably associated with nocturnal hypoxia but not necessarily sleep apnea.            He is current total testosterone is 277 (250-1100) and free testosterone 36.9 ().  Estradiol is satisfactory at 19    2. Polycythemia, secondary             Combination of testosterone and nocturnal hypoxia related to COPD.             Update hemogram    3. Hypothyroidism, unspecified type           Clinically euthyroid taking levothyroxine 150 mcg daily            Current TSH = 2.2    4. Adrenal  insufficiency (HCC)              This is presumably secondary to his immunotherapy and once established on steroid replacement we could not wean him off.  Currently taking hydrocortisone 20 mg a.m. and 10 mg p.m.  Occasionally has to stress dose.    5. Prediabetes           Recently has gained about 20 pounds due to COVID-19 restrictions.  Fasting glucose done recently 126.             He will try to lose 10 pounds rather than go on medication              Update A1c    DISPOSITION: Follow-up lab by phone or MyChart                            Return to clinic 6 months    Paco Manrique M.D.    Copies to: Mari Jenkins PLuizA.-C. [phone]                    Dr. Shane Agee

## 2021-06-08 ENCOUNTER — TELEPHONE (OUTPATIENT)
Dept: CARDIOLOGY | Facility: MEDICAL CENTER | Age: 71
End: 2021-06-08

## 2021-06-08 NOTE — TELEPHONE ENCOUNTER
LVM for pt in regards to lab work that was ordered at previous OV with YOG[facility] De La Fuente . Office number given for call back if pt has any questions or has had lab work done outside of [facility]. Pt has follow up appointment scheduled with  on 06/15/2021

## 2021-06-15 ENCOUNTER — OFFICE VISIT (OUTPATIENT)
Dept: CARDIOLOGY | Facility: MEDICAL CENTER | Age: 71
End: 2021-06-15
Payer: MEDICARE

## 2021-06-15 VITALS
SYSTOLIC BLOOD PRESSURE: 126 MMHG | HEART RATE: 81 BPM | OXYGEN SATURATION: 93 % | HEIGHT: 72 IN | BODY MASS INDEX: 31.64 KG/M2 | RESPIRATION RATE: 16 BRPM | DIASTOLIC BLOOD PRESSURE: 70 MMHG | WEIGHT: 233.6 LBS

## 2021-06-15 DIAGNOSIS — I48.91 ATRIAL FIBRILLATION WITH RAPID VENTRICULAR RESPONSE (HCC): ICD-10-CM

## 2021-06-15 DIAGNOSIS — E27.40 ADRENAL INSUFFICIENCY (HCC): ICD-10-CM

## 2021-06-15 DIAGNOSIS — I10 ESSENTIAL HYPERTENSION: ICD-10-CM

## 2021-06-15 DIAGNOSIS — E29.1 PRIMARY TESTICULAR HYPOGONADISM: ICD-10-CM

## 2021-06-15 DIAGNOSIS — I20.0 UNSTABLE ANGINA PECTORIS (HCC): ICD-10-CM

## 2021-06-15 DIAGNOSIS — R73.03 PREDIABETES: ICD-10-CM

## 2021-06-15 DIAGNOSIS — Z79.899 HIGH RISK MEDICATION USE: ICD-10-CM

## 2021-06-15 DIAGNOSIS — Z72.0 TOBACCO ABUSE: ICD-10-CM

## 2021-06-15 DIAGNOSIS — E87.6 HYPOKALEMIA: ICD-10-CM

## 2021-06-15 DIAGNOSIS — D75.1 POLYCYTHEMIA, SECONDARY: ICD-10-CM

## 2021-06-15 LAB — EKG IMPRESSION: NORMAL

## 2021-06-15 PROCEDURE — 99406 BEHAV CHNG SMOKING 3-10 MIN: CPT | Performed by: INTERNAL MEDICINE

## 2021-06-15 PROCEDURE — 99214 OFFICE O/P EST MOD 30 MIN: CPT | Mod: 25 | Performed by: INTERNAL MEDICINE

## 2021-06-15 PROCEDURE — 93000 ELECTROCARDIOGRAM COMPLETE: CPT | Performed by: INTERNAL MEDICINE

## 2021-06-15 RX ORDER — FLECAINIDE ACETATE 100 MG/1
100 TABLET ORAL 2 TIMES DAILY
Qty: 180 TABLET | Refills: 3 | Status: SHIPPED | OUTPATIENT
Start: 2021-06-15 | End: 2022-07-08 | Stop reason: SDUPTHER

## 2021-06-15 RX ORDER — DILTIAZEM HYDROCHLORIDE 120 MG/1
120 CAPSULE, COATED, EXTENDED RELEASE ORAL DAILY
Qty: 90 CAPSULE | Refills: 3 | Status: SHIPPED | OUTPATIENT
Start: 2021-06-15 | End: 2021-12-16

## 2021-06-15 RX ORDER — FELODIPINE 2.5 MG/1
2.5 TABLET, EXTENDED RELEASE ORAL DAILY
Qty: 90 TABLET | Refills: 3
Start: 2021-06-15 | End: 2022-07-08 | Stop reason: SDUPTHER

## 2021-06-15 ASSESSMENT — ENCOUNTER SYMPTOMS
COUGH: 0
NEUROLOGICAL NEGATIVE: 1
CLAUDICATION: 0
STRIDOR: 0
RESPIRATORY NEGATIVE: 1
SPUTUM PRODUCTION: 0
DIZZINESS: 0
GASTROINTESTINAL NEGATIVE: 1
ORTHOPNEA: 0
CHILLS: 0
SHORTNESS OF BREATH: 0
PALPITATIONS: 0
EYES NEGATIVE: 1
CARDIOVASCULAR NEGATIVE: 1
BRUISES/BLEEDS EASILY: 0
PND: 0
FEVER: 0
MUSCULOSKELETAL NEGATIVE: 1
WHEEZING: 0
LOSS OF CONSCIOUSNESS: 0
CONSTITUTIONAL NEGATIVE: 1
HEMOPTYSIS: 0
SORE THROAT: 0
WEAKNESS: 0

## 2021-06-15 NOTE — PROGRESS NOTES
Chief Complaint   Patient presents with   • Atrial Fibrillation     F/V Dx: Atrial fibrillation with rapid ventricular response (HCC)       Subjective:   Maykel Joseph is a 70 y.o. male who presents today as a follow-up for his atrial fibrillation high risk medication use polycythemia.    Since he was last seen he continues to smoke.  He is back on plan but does not want to discuss this.  Is otherwise been doing well.  He continues on the same medications.  He would like a Xarelto switch to a 90-day supply.      Past Medical History:   Diagnosis Date   • Adrenal disorder (HCC)    • Arrhythmia    • Breath shortness    • Cancer (HCC)     melanoma in 2007, original 1989 from back of neck (chemo)   • Cataract    • Emphysema of lung (HCC)    • Heart burn    • Hypertension    • Indigestion    • Metastatic melanoma (HCC) 1990 / 2012    original neck lesion resected 1990 / recurrent lesion resected 2007 / third lesion resected 2012 /recurrence in 2016   • Thyroid disease      Past Surgical History:   Procedure Laterality Date   • CATARACT PHACO WITH IOL Left 11/6/2018    Procedure: CATARACT PHACO WITH IOL;  Surgeon: Shraavn España M.D.;  Location: SURGERY SAME DAY [facility];  Service: Ophthalmology   • CATARACT PHACO WITH IOL Right 10/16/2018    Procedure: CATARACT PHACO WITH IOL;  Surgeon: Shravan España M.D.;  Location: SURGERY SAME DAY [facility];  Service: Ophthalmology   • COLONOSCOPY - ENDO  10/9/2016    Procedure: COLONOSCOPY - ENDO;  Surgeon: William Mitchell M.D.;  Location: SURGERY [facility];  Service:    • RECOVERY  7/26/2016    Procedure: CT-CT Guided abdominal mass-;  Surgeon: [facility];  Location: SURGERY PRE-POST PROC UNIT [facility];  Service:    • MASS EXCISION GENERAL  3/9/2012    Performed by MARLEN KEEN at SURGERY SAME DAY [facility] ORS   • OTHER      lymph nodes back of neck     Family History   Problem Relation Age of Onset   • Heart Disease Mother    • Cancer Father    •  Heart Disease Maternal Grandmother    • Heart Disease Maternal Grandfather    • Cancer Paternal Grandfather      Social History     Socioeconomic History   • Marital status:      Spouse name: Not on file   • Number of children: Not on file   • Years of education: Not on file   • Highest education level: Not on file   Occupational History   • Not on file   Tobacco Use   • Smoking status: Current Every Day Smoker     Packs/day: 0.50     Years: 40.00     Pack years: 20.00     Types: Cigarettes   • Smokeless tobacco: Never Used   • Tobacco comment: did vape, but has not quit 10/2019, 1/2-1 PPD   Vaping Use   • Vaping Use: Never used   Substance and Sexual Activity   • Alcohol use: Yes     Comment: 1 drink per month    • Drug use: No   • Sexual activity: Not on file   Other Topics Concern   • Not on file   Social History Narrative   • Not on file     Social Determinants of Health     Financial Resource Strain:    • Difficulty of Paying Living Expenses:    Food Insecurity:    • Worried About Running Out of Food in the Last Year:    • Ran Out of Food in the Last Year:    Transportation Needs:    • Lack of Transportation (Medical):    • Lack of Transportation (Non-Medical):    Physical Activity:    • Days of Exercise per Week:    • Minutes of Exercise per Session:    Stress:    • Feeling of Stress :    Social Connections:    • Frequency of Communication with Friends and Family:    • Frequency of Social Gatherings with Friends and Family:    • Attends [employer] Services:    • Active Member of Clubs or Organizations:    • Attends Club or Organization Meetings:    • Marital Status:    Intimate Partner Violence:    • Fear of Current or Ex-Partner:    • Emotionally Abused:    • Physically Abused:    • Sexually Abused:      Allergies   Allergen Reactions   • Sulfa Drugs      Severe muscle pains     Outpatient Encounter Medications as of 6/15/2021   Medication Sig Dispense Refill   • rivaroxaban (XARELTO) 20 MG Tab tablet  "Take 1 tablet by mouth with dinner. 90 tablet 11   • flecainide (TAMBOCOR) 100 MG Tab Take 1 tablet by mouth 2 times a day. 180 tablet 3   • DILTIAZem CD (CARDIZEM CD) 120 MG CAPSULE SR 24 HR Take 1 capsule by mouth every day. 90 capsule 3   • felodipine (PLENDIL) 2.5 MG TABLET SR 24 HR Take 1 tablet by mouth every day. 90 tablet 3   • testosterone cypionate (DEPO-TESTOSTERONE) 200 MG/ML Solution injection INJECT 0.5 ML IN THE MUSCLE EVERY 10 DAYS     • Fluticasone-Umeclidin-Vilant (TRELEGY ELLIPTA) 100-62.5-25 MCG/INH AEROSOL POWDER, BREATH ACTIVATED Inhale 100 mcg every day. 1 Each 11   • triamcinolone acetonide (KENALOG) 0.1 % Cream Apply 1 Application topically as needed.     • hydrocortisone (CORTEF) 10 MG Tab TAKE 2 TABLETS BY MOUTH TWICE DAILY 120 Tab 5   • levothyroxine (SYNTHROID) 150 MCG Tab take 1 tablet by mouth once daily 90 Tab 3   • B-D 3CC LUER-BO SYR 23GX1\" 23G X 1\" 3 ML Misc use as directed 6 Each 2   • PROAIR  (90 Base) MCG/ACT Aero Soln inhalation aerosol   0   • gentamicin (GARAMYCIN) 0.3 % Solution instill 1 drop into affected eye every 4 hours  0   • ondansetron (ZOFRAN ODT) 4 MG TABLET DISPERSIBLE Take 4 mg by mouth every 6 hours as needed.  0   • HYDROcodone-acetaminophen (NORCO) 7.5-325 MG per tablet Take 1-2 Tabs by mouth every 8 hours as needed.     • tamsulosin (FLOMAX) 0.4 MG capsule Take 0.4 mg by mouth ONE-HALF HOUR AFTER BREAKFAST.     • simvastatin (ZOCOR) 20 MG TABS Take 20 mg by mouth every morning.     • omeprazole (PRILOSEC) 20 MG CPDR Take 20 mg by mouth every day.     • Multiple Vitamin (MULTIVITAMIN PO) Take  by mouth every day.     • [DISCONTINUED] rivaroxaban (XARELTO) 20 MG Tab tablet Take 1 Tab by mouth with dinner. 30 Tab 11   • [DISCONTINUED] DILTIAZem CD (CARDIZEM CD) 120 MG CAPSULE SR 24 HR Take 1 Cap by mouth every day. 90 Cap 3   • [DISCONTINUED] flecainide (TAMBOCOR) 100 MG Tab Take 1 Tab by mouth 2 times a day. 180 Tab 3   • [DISCONTINUED] felodipine " (PLENDIL) 2.5 MG TABLET SR 24 HR Take 1 Tab by mouth every day. 90 Tab 3     No facility-administered encounter medications on file as of 6/15/2021.     Review of Systems   Constitutional: Negative.  Negative for chills, fever and malaise/fatigue.   HENT: Negative.  Negative for sore throat.    Eyes: Negative.    Respiratory: Negative.  Negative for cough, hemoptysis, sputum production, shortness of breath, wheezing and stridor.    Cardiovascular: Negative.  Negative for chest pain, palpitations, orthopnea, claudication, leg swelling and PND.   Gastrointestinal: Negative.    Genitourinary: Negative.    Musculoskeletal: Negative.    Skin: Negative.    Neurological: Negative.  Negative for dizziness, loss of consciousness and weakness.   Endo/Heme/Allergies: Negative.  Does not bruise/bleed easily.   All other systems reviewed and are negative.       Objective:   /70 (BP Location: Left arm, Patient Position: Sitting, BP Cuff Size: Adult)   Pulse 81   Resp 16   Ht 1.829 m (6')   Wt 106 kg (233 lb 9.6 oz)   SpO2 93%   BMI 31.68 kg/m²     Physical Exam   Constitutional: He appears well-developed. No distress.   HENT:   Head: Normocephalic and atraumatic.   Right Ear: External ear normal.   Left Ear: External ear normal.   Nose: Nose normal.   Mouth/Throat: No oropharyngeal exudate.   Eyes: Pupils are equal, round, and reactive to light. Conjunctivae are normal. Right eye exhibits no discharge. Left eye exhibits no discharge. No scleral icterus.   Neck: No JVD present.   Cardiovascular: Normal rate and regular rhythm. Exam reveals no gallop and no friction rub.   No murmur heard.  Pulmonary/Chest: Effort normal. No stridor. No respiratory distress. He has no wheezes. He has no rales. He exhibits no tenderness.   Abdominal: Soft. He exhibits no distension. There is no guarding.   Musculoskeletal:         General: No tenderness or deformity. Normal range of motion.      Cervical back: Neck supple.    Neurological: He is alert. He has normal reflexes. He displays normal reflexes. No cranial nerve deficit. He exhibits normal muscle tone. Coordination normal.   Skin: Skin is warm and dry. No rash noted. He is not diaphoretic. No erythema. No pallor.   Psychiatric: His behavior is normal. Judgment and thought content normal.   Nursing note and vitals reviewed.    EKG: Dated 7/23/2018 personally reviewed interpreted myself showing normal sinus rhythm with a right bundle branch block.  QRS of 88 ms, unchanged from prior.    Assessment:     1. Essential hypertension  EKG    DILTIAZem CD (CARDIZEM CD) 120 MG CAPSULE SR 24 HR    felodipine (PLENDIL) 2.5 MG TABLET SR 24 HR   2. Atrial fibrillation with rapid ventricular response (HCC)  rivaroxaban (XARELTO) 20 MG Tab tablet    flecainide (TAMBOCOR) 100 MG Tab    DILTIAZem CD (CARDIZEM CD) 120 MG CAPSULE SR 24 HR   3. High risk medication use  rivaroxaban (XARELTO) 20 MG Tab tablet    Lipid Profile   4. Adrenal insufficiency (HCC)  CBC W/ DIFF W/O PLATELETS    Lipid Profile   5. Polycythemia, secondary  CBC W/ DIFF W/O PLATELETS    Comp Metabolic Panel   6. Primary testicular hypogonadism  CBC W/ DIFF W/O PLATELETS    Comp Metabolic Panel    Lipid Profile   7. Tobacco abuse  CBC W/ DIFF W/O PLATELETS    Comp Metabolic Panel   8. Prediabetes  CBC W/ DIFF W/O PLATELETS    Comp Metabolic Panel   9. Hypokalemia     10. Unstable angina pectoris (HCC)         Medical Decision Making:  Today's Assessment / Status / Plan:     70-year-old male with paroxysmal atrial fibrillation polycythemia and high risk medication usage.  I refilled the Xarelto for 90 days.  His labs which I reviewed and scanned in media show normal creatinine normal potassium for his high risk medication.  We will recheck labs in 1 year.  I will see him back in 1 year.  His ECG looks appropriate for his high risk medication usage.  We did briefly his discuss smoking and smoking cessation however he is  precontemplative.

## 2021-07-28 DIAGNOSIS — E03.9 HYPOTHYROIDISM, UNSPECIFIED TYPE: ICD-10-CM

## 2021-07-28 RX ORDER — LEVOTHYROXINE SODIUM 0.15 MG/1
TABLET ORAL
Qty: 90 TABLET | Refills: 3 | Status: SHIPPED | OUTPATIENT
Start: 2021-07-28 | End: 2022-05-02 | Stop reason: SDUPTHER

## 2021-09-08 DIAGNOSIS — E27.40 ADRENAL INSUFFICIENCY (HCC): ICD-10-CM

## 2021-09-10 RX ORDER — HYDROCORTISONE 10 MG/1
TABLET ORAL
Qty: 120 TABLET | Refills: 5 | Status: SHIPPED | OUTPATIENT
Start: 2021-09-10 | End: 2022-04-26

## 2021-09-10 NOTE — TELEPHONE ENCOUNTER
Received request via: Pharmacy    Was the patient seen in the last year in this department? Yes    Does the patient have an active prescription (recently filled or refills available) for medication(s) requested? No     REQUESTED MEDICATION:   Requested Prescriptions     Pending Prescriptions Disp Refills   • hydrocortisone (CORTEF) 10 MG Tab [Pharmacy Med Name: HYDROCORTISONE 10 MG TABLET] 120 Tablet 5     Sig: take 2 tablet by mouth twice a day

## 2021-10-04 DIAGNOSIS — E29.1 PRIMARY TESTICULAR HYPOGONADISM: ICD-10-CM

## 2021-10-04 DIAGNOSIS — Z79.899 HIGH RISK MEDICATION USE: ICD-10-CM

## 2021-10-04 DIAGNOSIS — E27.40 ADRENAL INSUFFICIENCY (HCC): ICD-10-CM

## 2021-10-04 DIAGNOSIS — R73.03 PREDIABETES: ICD-10-CM

## 2021-10-04 DIAGNOSIS — D75.1 POLYCYTHEMIA, SECONDARY: ICD-10-CM

## 2021-10-04 DIAGNOSIS — Z72.0 TOBACCO ABUSE: ICD-10-CM

## 2021-10-26 ENCOUNTER — OFFICE VISIT (OUTPATIENT)
Dept: ENDOCRINOLOGY | Facility: MEDICAL CENTER | Age: 71
End: 2021-10-26
Attending: INTERNAL MEDICINE
Payer: MEDICARE

## 2021-10-26 VITALS
HEART RATE: 80 BPM | WEIGHT: 230.1 LBS | BODY MASS INDEX: 31.17 KG/M2 | SYSTOLIC BLOOD PRESSURE: 108 MMHG | RESPIRATION RATE: 16 BRPM | OXYGEN SATURATION: 91 % | HEIGHT: 72 IN | DIASTOLIC BLOOD PRESSURE: 60 MMHG

## 2021-10-26 DIAGNOSIS — E29.1 PRIMARY TESTICULAR HYPOGONADISM: ICD-10-CM

## 2021-10-26 DIAGNOSIS — S32.000S COMPRESSION FRACTURE OF LUMBAR VERTEBRA, UNSPECIFIED LUMBAR VERTEBRAL LEVEL, SEQUELA: ICD-10-CM

## 2021-10-26 DIAGNOSIS — D75.1 POLYCYTHEMIA, SECONDARY: ICD-10-CM

## 2021-10-26 DIAGNOSIS — M80.00XA OSTEOPOROSIS WITH CURRENT PATHOLOGICAL FRACTURE, UNSPECIFIED OSTEOPOROSIS TYPE, INITIAL ENCOUNTER: ICD-10-CM

## 2021-10-26 DIAGNOSIS — E27.40 ADRENAL INSUFFICIENCY (HCC): ICD-10-CM

## 2021-10-26 DIAGNOSIS — E28.0 ESTRADIOL EXCESS: ICD-10-CM

## 2021-10-26 DIAGNOSIS — M81.0 OSTEOPOROSIS WITHOUT CURRENT PATHOLOGICAL FRACTURE, UNSPECIFIED OSTEOPOROSIS TYPE: ICD-10-CM

## 2021-10-26 DIAGNOSIS — E03.9 HYPOTHYROIDISM, UNSPECIFIED TYPE: ICD-10-CM

## 2021-10-26 PROBLEM — M48.50XA VERTEBRAL COMPRESSION FRACTURE (HCC): Status: ACTIVE | Noted: 2021-10-26

## 2021-10-26 PROCEDURE — 99211 OFF/OP EST MAY X REQ PHY/QHP: CPT | Performed by: INTERNAL MEDICINE

## 2021-10-26 PROCEDURE — 99214 OFFICE O/P EST MOD 30 MIN: CPT | Performed by: INTERNAL MEDICINE

## 2021-10-26 RX ORDER — SYRINGE WITH NEEDLE, 1 ML 25GX5/8"
SYRINGE, EMPTY DISPOSABLE MISCELLANEOUS
COMMUNITY
End: 2022-05-02 | Stop reason: SDUPTHER

## 2021-10-26 RX ORDER — TAMSULOSIN HYDROCHLORIDE 0.4 MG/1
CAPSULE ORAL
COMMUNITY
End: 2023-07-10

## 2021-10-26 RX ORDER — METHOCARBAMOL 500 MG/1
TABLET, FILM COATED ORAL
COMMUNITY
End: 2021-12-16

## 2021-10-26 RX ORDER — DILTIAZEM HYDROCHLORIDE 120 MG/1
CAPSULE, EXTENDED RELEASE ORAL
COMMUNITY
End: 2022-07-08

## 2021-10-26 RX ORDER — ACETAMINOPHEN 500 MG
TABLET ORAL
COMMUNITY
End: 2021-12-16

## 2021-10-26 RX ORDER — ACETAMINOPHEN 325 MG/1
TABLET ORAL
COMMUNITY

## 2021-10-27 NOTE — PROGRESS NOTES
"Chief Complaint   Patient presents with   • Follow-Up     Multiple problems        HPI:    See assessment and recommendations below    ROS:  Feeling very well with no additional complaints.      Allergies:   Allergies   Allergen Reactions   • Sulfa Drugs      Severe muscle pains       Current medicines including changes today:  Current Outpatient Medications   Medication Sig Dispense Refill   • SYRINGE-NEEDLE, DISP, 3 ML (B-D 3CC LUER-BO SYR 20DB0-6/2) 23G X 1-1/2\" 3 ML Misc BD Luer-Bo Syringe 3 mL 23 x 1\"   use as directed     • Fluticasone-Umeclidin-Vilant (TRELEGY ELLIPTA) 100-62.5-25 MCG/INH AEROSOL POWDER, BREATH ACTIVATED inhalation Trelegy Ellipta 100 mcg-62.5 mcg-25 mcg powder for inhalation   inhale 1 puff by mouth once daily     • acetaminophen (TYLENOL) 325 MG Tab acetaminophen 325 mg tablet   take 2 tablets by mouth three times a day if needed for pain for 5 days     • acetaminophen (TYLENOL) 500 MG Tab acetaminophen 500 mg tablet   take 1 to 2 tablets by mouth every 8 hours if needed     • diltiazem (TIAZAC) 120 MG SR capsule diltiazem  mg capsule,extended release 24 hr   take 1 capsule by mouth once daily     • tamsulosin (FLOMAX) 0.4 MG capsule tamsulosin 0.4 mg capsule   Take 2 capsules every day by oral route at bedtime.   hold if having dizziness when standing up     • hydrocortisone (CORTEF) 10 MG Tab take 2 tablet by mouth twice a day 120 Tablet 5   • levothyroxine (SYNTHROID) 150 MCG Tab take 1 tablet by mouth once daily 90 tablet 3   • rivaroxaban (XARELTO) 20 MG Tab tablet Take 1 tablet by mouth with dinner. 90 tablet 11   • flecainide (TAMBOCOR) 100 MG Tab Take 1 tablet by mouth 2 times a day. 180 tablet 3   • DILTIAZem CD (CARDIZEM CD) 120 MG CAPSULE SR 24 HR Take 1 capsule by mouth every day. 90 capsule 3   • felodipine (PLENDIL) 2.5 MG TABLET SR 24 HR Take 1 tablet by mouth every day. 90 tablet 3   • testosterone cypionate (DEPO-TESTOSTERONE) 200 MG/ML Solution injection INJECT 0.5 " "ML IN THE MUSCLE EVERY 10 DAYS     • Fluticasone-Umeclidin-Vilant (TRELEGY ELLIPTA) 100-62.5-25 MCG/INH AEROSOL POWDER, BREATH ACTIVATED Inhale 100 mcg every day. 1 Each 11   • B-D 3CC LUER-BO SYR 23GX1\" 23G X 1\" 3 ML Misc use as directed 6 Each 2   • PROAIR  (90 Base) MCG/ACT Aero Soln inhalation aerosol   0   • gentamicin (GARAMYCIN) 0.3 % Solution instill 1 drop into affected eye every 4 hours  0   • HYDROcodone-acetaminophen (NORCO) 7.5-325 MG per tablet Take 1-2 Tabs by mouth every 8 hours as needed.     • tamsulosin (FLOMAX) 0.4 MG capsule Take 0.4 mg by mouth ONE-HALF HOUR AFTER BREAKFAST.     • simvastatin (ZOCOR) 20 MG TABS Take 20 mg by mouth every morning.     • omeprazole (PRILOSEC) 20 MG CPDR Take 20 mg by mouth every day.     • Multiple Vitamin (MULTIVITAMIN PO) Take  by mouth every day.     • methocarbamol (ROBAXIN) 500 MG Tab methocarbamol 500 mg tablet   take 2 tablets by mouth every 8 hours (Patient not taking: Reported on 10/26/2021)     • triamcinolone acetonide (KENALOG) 0.1 % Cream Apply 1 Application topically as needed. (Patient not taking: Reported on 10/26/2021)     • ondansetron (ZOFRAN ODT) 4 MG TABLET DISPERSIBLE Take 4 mg by mouth every 6 hours as needed. (Patient not taking: Reported on 10/26/2021)  0     No current facility-administered medications for this visit.        Past Medical History:   Diagnosis Date   • Adrenal disorder (HCC)    • Arrhythmia    • Breath shortness    • Cancer (HCC)     melanoma in 2007, original 1989 from back of neck (chemo)   • Cataract    • Emphysema of lung (HCC)    • Heart burn    • Hypertension    • Indigestion    • Metastatic melanoma (HCC) 1990 / 2012    original neck lesion resected 1990 / recurrent lesion resected 2007 / third lesion resected 2012 /recurrence in 2016   • Thyroid disease        PHYSICAL EXAM:    /60 (BP Location: Left arm, Patient Position: Sitting, BP Cuff Size: Adult)   Pulse 80   Resp 16   Ht 1.829 m (6')   Wt " 104 kg (230 lb 1.6 oz)   SpO2 91%   BMI 31.21 kg/m²     Gen. overweight but otherwise appears healthy             No cushingoid features    Skin   appropriate for sex and age             No ecchymoses, purpura, thin skin or abdominal striae consistent with excessive cortisol effect    HEENT  unremarkable    Neck   no adenopathy, I cannot feel his thyroid.    Breasts    normal male fatty tissue.  No gynecomastia    Heart  regular    Extremities  no edema    Neuro  gait and station normal    Psych  appropriate      ASSESSMENT AND RECOMMENDATIONS    1. Primary testicular hypogonadism            Etiology unknown but well documented with low testosterone and elevated LH levels.           Satisfied with current testosterone replacement of 80 mg IM every 10 days.           Recent free testosterone level is 36.9 ()    2. Polycythemia, secondary            Presumably relates to a combination of testosterone dosing even though not particularly high and nocturnal hypoxia related to COPD.            He has a standing order at the lab to do a hemogram every 2 to 3 months for phlebotomy if necessary which it rarely is    3. Hypothyroidism, unspecified type             Clinically euthyroid taking levothyroxine 150 mcg/day.  I do not have a current TSH but previously was normal at 2.2    4. Estradiol excess          No test results at this time but does not have breast tenderness or gynecomastia.    5. Adrenal insufficiency (HCC)            I think this is a permanent condition related to previous immunotherapy for melanoma.            Currently taking hydrocortisone 20 mg a.m. and 10 mg p.m.  He knows about stress dosing rarely is necessary    6. Compression fracture of lumbar vertebra, unspecified lumbar vertebral level, sequela          This is a new problem to me although it occurred a year ago when he was lifting a heavy log.  His back suddenly developed excruciating pain.  He was identified as having a compression  fracture of one of his vertebrae which I did not know about he only had a recent CAT scan apparently has demonstrated.  I do not know what level but I think it was just 1 vertebrae presumably lumbar.  I will try to get the x-ray.           This however qualifies as an equivalent of osteoporosis so we will do a bone density to establish a baseline.  We will discuss the need for possible medication.    7.  Osteoporosis without current pathological fracture, unspecified osteoporosis type                 The presumptive diagnosis based on  #6 above.  Bone density is pending       DISPOSITION: Follow-up above lab by telephone and further discuss.  I also indicated that I am retiring first of next year.  For our part we will have this office staff continue management of hydrocortisone and testosterone and possibly treatment of osteoporosis.      Paco Manrique M.D.    Copies to: LAKESHA Wylie-C. [phone]

## 2021-10-30 DIAGNOSIS — E29.1 PRIMARY TESTICULAR HYPOGONADISM: ICD-10-CM

## 2021-11-01 RX ORDER — SYRINGE WITH NEEDLE, 1 ML 25GX5/8"
SYRINGE, EMPTY DISPOSABLE MISCELLANEOUS
Qty: 6 EACH | Refills: 2 | Status: SHIPPED | OUTPATIENT
Start: 2021-11-01 | End: 2022-05-02 | Stop reason: SDUPTHER

## 2021-11-01 RX ORDER — TESTOSTERONE CYPIONATE 200 MG/ML
INJECTION, SOLUTION INTRAMUSCULAR
Qty: 5 ML | Refills: 1 | Status: SHIPPED | OUTPATIENT
Start: 2021-11-01 | End: 2021-11-11

## 2021-11-15 ENCOUNTER — TELEPHONE (OUTPATIENT)
Dept: ENDOCRINOLOGY | Facility: MEDICAL CENTER | Age: 71
End: 2021-11-15

## 2021-12-16 ENCOUNTER — OFFICE VISIT (OUTPATIENT)
Dept: SLEEP MEDICINE | Facility: MEDICAL CENTER | Age: 71
End: 2021-12-16
Payer: MEDICARE

## 2021-12-16 ENCOUNTER — NON-PROVIDER VISIT (OUTPATIENT)
Dept: SLEEP MEDICINE | Facility: MEDICAL CENTER | Age: 71
End: 2021-12-16
Attending: INTERNAL MEDICINE
Payer: MEDICARE

## 2021-12-16 VITALS
SYSTOLIC BLOOD PRESSURE: 116 MMHG | OXYGEN SATURATION: 93 % | TEMPERATURE: 97.7 F | BODY MASS INDEX: 32.64 KG/M2 | RESPIRATION RATE: 16 BRPM | HEIGHT: 70 IN | WEIGHT: 228 LBS | DIASTOLIC BLOOD PRESSURE: 68 MMHG | HEART RATE: 63 BPM

## 2021-12-16 VITALS — HEIGHT: 70 IN | WEIGHT: 228 LBS | BODY MASS INDEX: 32.64 KG/M2

## 2021-12-16 DIAGNOSIS — J44.9 CHRONIC OBSTRUCTIVE PULMONARY DISEASE, UNSPECIFIED COPD TYPE (HCC): ICD-10-CM

## 2021-12-16 DIAGNOSIS — Z72.0 TOBACCO USE: ICD-10-CM

## 2021-12-16 DIAGNOSIS — C43.9 METASTATIC MELANOMA (HCC): ICD-10-CM

## 2021-12-16 DIAGNOSIS — G47.34 NOCTURNAL HYPOXIA: ICD-10-CM

## 2021-12-16 PROCEDURE — 94010 BREATHING CAPACITY TEST: CPT | Performed by: INTERNAL MEDICINE

## 2021-12-16 PROCEDURE — 99214 OFFICE O/P EST MOD 30 MIN: CPT | Mod: 25 | Performed by: INTERNAL MEDICINE

## 2021-12-16 PROCEDURE — 94729 DIFFUSING CAPACITY: CPT | Performed by: INTERNAL MEDICINE

## 2021-12-16 PROCEDURE — 94726 PLETHYSMOGRAPHY LUNG VOLUMES: CPT | Performed by: INTERNAL MEDICINE

## 2021-12-16 ASSESSMENT — ENCOUNTER SYMPTOMS
WHEEZING: 0
ABDOMINAL PAIN: 0
HEARTBURN: 0
SINUS PAIN: 0
HEMOPTYSIS: 0
EYE DISCHARGE: 0
CONSTIPATION: 0
DIZZINESS: 0
HEADACHES: 0
FOCAL WEAKNESS: 0
TREMORS: 0
DOUBLE VISION: 0
PALPITATIONS: 0
BACK PAIN: 0
SORE THROAT: 0
VOMITING: 0
BLURRED VISION: 0
FEVER: 0
CHILLS: 0
WEIGHT LOSS: 0
DIAPHORESIS: 0
EYE REDNESS: 0
STRIDOR: 0
NAUSEA: 0
MYALGIAS: 0
ORTHOPNEA: 0
PHOTOPHOBIA: 0
EYE PAIN: 0
SPEECH CHANGE: 0
FALLS: 0
PND: 0
NECK PAIN: 0
WEAKNESS: 0
SHORTNESS OF BREATH: 0
CLAUDICATION: 0
COUGH: 0
DIARRHEA: 0
SPUTUM PRODUCTION: 0
DEPRESSION: 0

## 2021-12-16 ASSESSMENT — PULMONARY FUNCTION TESTS
FVC: 5.07
FVC_LLN: 3.55
FEV1_LLN: 2.69
FEV1/FVC: 43
FEV1/FVC_PERCENT_LLN: 63
FEV1: 2.18
FEV1/FVC_PERCENT_PREDICTED: 56
FVC_PREDICTED: 4.25
FEV1_PREDICTED: 3.22
FEV1/FVC: 43
FEV1_PERCENT_PREDICTED: 67
FVC_PERCENT_PREDICTED: 119
FEV1/FVC_PERCENT_PREDICTED: 76
FEV1/FVC_PREDICTED: 76
FEV1/FVC_PERCENT_PREDICTED: 56
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
## 2021-12-16 NOTE — PROCEDURES
Technician: Elana Choudhury RRT, CPFT  Good patient effort & cooperation. DUE TO NO SPACERS NO POST FVC. The results of this test meet the ATS/ERS standards for acceptability & reproducibility.  Test was performed on the [employer] Body Plethysmograph-Elite DX system.  Predicted equations for Spirometry are GLI-2012, ITS for lung volumes, and GLI-2017 for DLCO.  The DLCO was uncorrected for Hgb.     Interpretation;   Baseline spirometry shows airflow obstruction with FEV1/FVC ratio of 43 and an FEV1 of 2.18 L or 67% predicted.  Bronchodilator testing was not performed.  Total lung capacity is within normal limits.  No significant air trapping.  Diffusion capacity is mildly reduced at 73% predicted.  Pulmonary function testing shows moderate airflow obstruction with mildly reduced DLCO consistent with a diagnosis of COPD.

## 2022-02-02 ENCOUNTER — TELEPHONE (OUTPATIENT)
Dept: CARDIOLOGY | Facility: MEDICAL CENTER | Age: 72
End: 2022-02-02

## 2022-02-02 DIAGNOSIS — I10 ESSENTIAL HYPERTENSION: ICD-10-CM

## 2022-02-02 DIAGNOSIS — I48.91 ATRIAL FIBRILLATION WITH RAPID VENTRICULAR RESPONSE (HCC): ICD-10-CM

## 2022-02-02 RX ORDER — DILTIAZEM HYDROCHLORIDE 120 MG/1
120 CAPSULE, COATED, EXTENDED RELEASE ORAL DAILY
Qty: 90 CAPSULE | Refills: 1 | Status: SHIPPED | OUTPATIENT
Start: 2022-02-02 | End: 2022-07-08 | Stop reason: SDUPTHER

## 2022-02-02 NOTE — TELEPHONE ENCOUNTER
SHAYNE Lopez,    This patient called and wanted to know why his pharmacy told him there was no more refills and that the script for diltiazem (TIAZAC) 120 MG SR capsule was cancelled. Can you please call him back at [phone].      Thank you,    SALVADOR

## 2022-03-28 ENCOUNTER — TELEPHONE (OUTPATIENT)
Dept: ENDOCRINOLOGY | Facility: MEDICAL CENTER | Age: 72
End: 2022-03-28
Payer: MEDICARE

## 2022-03-28 ENCOUNTER — TELEPHONE (OUTPATIENT)
Dept: CARDIOLOGY | Facility: MEDICAL CENTER | Age: 72
End: 2022-03-28
Payer: MEDICARE

## 2022-03-28 DIAGNOSIS — Z79.899 HIGH RISK MEDICATION USE: ICD-10-CM

## 2022-03-28 NOTE — TELEPHONE ENCOUNTER
Patient called and wanted to make sure that a Lab order for TSH and Testosterone are available for his 5/2/2022 appointment.

## 2022-03-28 NOTE — TELEPHONE ENCOUNTER
RO    Patient called and stated that he needs his lab orders sent to the address on file so that he can get them completed prior to his upcoming appointment 6/16/22. Please advise.    Thank you,  Sim GALDAMEZ

## 2022-04-11 DIAGNOSIS — E27.40 ADRENAL INSUFFICIENCY (HCC): ICD-10-CM

## 2022-04-11 DIAGNOSIS — E03.9 HYPOTHYROIDISM, UNSPECIFIED TYPE: ICD-10-CM

## 2022-04-11 DIAGNOSIS — E55.9 VITAMIN D DEFICIENCY: ICD-10-CM

## 2022-04-11 DIAGNOSIS — E29.1 PRIMARY TESTICULAR HYPOGONADISM: ICD-10-CM

## 2022-04-11 DIAGNOSIS — E28.0 ESTRADIOL EXCESS: ICD-10-CM

## 2022-04-11 DIAGNOSIS — M80.00XA OSTEOPOROSIS WITH CURRENT PATHOLOGICAL FRACTURE, UNSPECIFIED OSTEOPOROSIS TYPE, INITIAL ENCOUNTER: ICD-10-CM

## 2022-04-26 ENCOUNTER — TELEPHONE (OUTPATIENT)
Dept: CARDIOLOGY | Facility: MEDICAL CENTER | Age: 72
End: 2022-04-26
Payer: MEDICARE

## 2022-04-26 DIAGNOSIS — E27.40 ADRENAL INSUFFICIENCY (HCC): ICD-10-CM

## 2022-04-26 LAB
ALBUMIN SERPL-MCNC: 4.1 G/DL (ref 3.8–4.8)
ALBUMIN/GLOB SERPL: 1.8 {RATIO} (ref 1.2–2.2)
ALP SERPL-CCNC: 130 IU/L (ref 44–121)
ALT SERPL-CCNC: 17 IU/L (ref 0–44)
AMBIG ABBREV CMP14 DFLT   977206: NORMAL
AMBIG ABBREV LP  977212: NORMAL
AST SERPL-CCNC: 20 IU/L (ref 0–40)
BASOPHILS # BLD AUTO: 0 X10E3/UL (ref 0–0.2)
BASOPHILS NFR BLD AUTO: 0 %
BILIRUB SERPL-MCNC: 0.6 MG/DL (ref 0–1.2)
BUN SERPL-MCNC: 10 MG/DL (ref 8–27)
BUN/CREAT SERPL: 11 (ref 10–24)
CALCIUM SERPL-MCNC: 9.4 MG/DL (ref 8.6–10.2)
CHLORIDE SERPL-SCNC: 105 MMOL/L (ref 96–106)
CHOLEST SERPL-MCNC: 147 MG/DL (ref 100–199)
CO2 SERPL-SCNC: 23 MMOL/L (ref 20–29)
CREAT SERPL-MCNC: 0.92 MG/DL (ref 0.76–1.27)
EGFRCR SERPLBLD CKD-EPI 2021: 89 ML/MIN/1.73
EOSINOPHIL # BLD AUTO: 0.1 X10E3/UL (ref 0–0.4)
EOSINOPHIL NFR BLD AUTO: 1 %
ERYTHROCYTE [DISTWIDTH] IN BLOOD BY AUTOMATED COUNT: 13.4 % (ref 11.6–15.4)
GLOBULIN SER CALC-MCNC: 2.3 G/DL (ref 1.5–4.5)
GLUCOSE SERPL-MCNC: 113 MG/DL (ref 65–99)
HCT VFR BLD AUTO: 52.3 % (ref 37.5–51)
HDLC SERPL-MCNC: 64 MG/DL
HGB BLD-MCNC: 17.5 G/DL (ref 13–17.7)
IMM GRANULOCYTES # BLD AUTO: 0 X10E3/UL (ref 0–0.1)
IMM GRANULOCYTES NFR BLD AUTO: 0 %
IMMATURE CELLS  115398: ABNORMAL
LABORATORY COMMENT REPORT: NORMAL
LDLC SERPL CALC-MCNC: 60 MG/DL (ref 0–99)
LYMPHOCYTES # BLD AUTO: 1.6 X10E3/UL (ref 0.7–3.1)
LYMPHOCYTES NFR BLD AUTO: 16 %
MCH RBC QN AUTO: 31.4 PG (ref 26.6–33)
MCHC RBC AUTO-ENTMCNC: 33.5 G/DL (ref 31.5–35.7)
MCV RBC AUTO: 94 FL (ref 79–97)
MONOCYTES # BLD AUTO: 0.8 X10E3/UL (ref 0.1–0.9)
MONOCYTES NFR BLD AUTO: 8 %
MORPHOLOGY BLD-IMP: ABNORMAL
NEUTROPHILS # BLD AUTO: 7.1 X10E3/UL (ref 1.4–7)
NEUTROPHILS NFR BLD AUTO: 75 %
NRBC BLD AUTO-RTO: ABNORMAL %
PLATELET # BLD AUTO: 113 X10E3/UL (ref 150–450)
POTASSIUM SERPL-SCNC: 4.2 MMOL/L (ref 3.5–5.2)
PROT SERPL-MCNC: 6.4 G/DL (ref 6–8.5)
RBC # BLD AUTO: 5.58 X10E6/UL (ref 4.14–5.8)
SODIUM SERPL-SCNC: 142 MMOL/L (ref 134–144)
TRIGL SERPL-MCNC: 134 MG/DL (ref 0–149)
VLDLC SERPL CALC-MCNC: 23 MG/DL (ref 5–40)
WBC # BLD AUTO: 9.6 X10E3/UL (ref 3.4–10.8)

## 2022-04-26 RX ORDER — HYDROCORTISONE 10 MG/1
TABLET ORAL
Qty: 120 TABLET | Refills: 5 | Status: SHIPPED | OUTPATIENT
Start: 2022-04-26 | End: 2022-05-02 | Stop reason: SDUPTHER

## 2022-04-26 NOTE — TELEPHONE ENCOUNTER
----- Message from John Dill R.N. sent at 4/26/2022  9:19 AM PDT -----  To MA-No abnormalities of concern. No changes to plan of care. Continue to follow up as scheduled. TY

## 2022-04-26 NOTE — LETTER
April 26, 2022        Artem Joseph  [address]  [facility] [address]          Dear Artem,    We have received the results of your recent:    Lab Work     Your test came back within normal limits.No abnormalities of concern. Continue to follow up as scheduled. Please follow up as previously discussed with your physician.      Feel free to call us with any questions.        Sincerely,          Anuj Alvarado [employer]    Electronically Signed

## 2022-05-01 LAB
25(OH)D3+25(OH)D2 SERPL-MCNC: 22.3 NG/ML (ref 30–100)
ACTH PLAS-MCNC: <1.5 PG/ML (ref 7.2–63.3)
ALBUMIN SERPL-MCNC: 4.2 G/DL (ref 3.8–4.8)
ALBUMIN/GLOB SERPL: 1.9 {RATIO} (ref 1.2–2.2)
ALP SERPL-CCNC: 120 IU/L (ref 44–121)
ALT SERPL-CCNC: 16 IU/L (ref 0–44)
AMBIG ABBREV CMP14 DFLT   977206: NORMAL
AST SERPL-CCNC: 21 IU/L (ref 0–40)
BILIRUB SERPL-MCNC: 0.6 MG/DL (ref 0–1.2)
BUN SERPL-MCNC: 11 MG/DL (ref 8–27)
BUN/CREAT SERPL: 11 (ref 10–24)
CALCIUM SERPL-MCNC: 9.4 MG/DL (ref 8.6–10.2)
CHLORIDE SERPL-SCNC: 106 MMOL/L (ref 96–106)
CO2 SERPL-SCNC: 23 MMOL/L (ref 20–29)
CREAT SERPL-MCNC: 0.97 MG/DL (ref 0.76–1.27)
EGFRCR SERPLBLD CKD-EPI 2021: 84 ML/MIN/1.73
ESTRADIOL SERPL-MCNC: 18.6 PG/ML (ref 7.6–42.6)
GLOBULIN SER CALC-MCNC: 2.2 G/DL (ref 1.5–4.5)
GLUCOSE SERPL-MCNC: 114 MG/DL (ref 65–99)
HCT VFR BLD AUTO: 53.3 % (ref 37.5–51)
HGB BLD-MCNC: 17.7 G/DL (ref 13–17.7)
IGF-I SERPL-MCNC: 153 NG/ML (ref 59–230)
PHOSPHATE SERPL-MCNC: 2.8 MG/DL (ref 2.8–4.1)
POTASSIUM SERPL-SCNC: 4.4 MMOL/L (ref 3.5–5.2)
PROT SERPL-MCNC: 6.4 G/DL (ref 6–8.5)
PSA SERPL-MCNC: 3.3 NG/ML (ref 0–4)
PTH-INTACT SERPL-MCNC: 30 PG/ML (ref 15–65)
REFLEX CRITERIA 480647: NORMAL
SHBG SERPL-SCNC: 42.1 NMOL/L
SODIUM SERPL-SCNC: 144 MMOL/L (ref 134–144)
T4 FREE SERPL-MCNC: 1.72 NG/DL (ref 0.82–1.77)
TESTOST FREE MFR SERPL: 0.8 %
TESTOST FREE SERPL-MCNC: 25 PG/ML
TESTOST SERPL-MCNC: 309 NG/DL
TESTOST SERPL-MCNC: 309 NG/DL
TSH SERPL DL<=0.005 MIU/L-ACNC: 2.44 UIU/ML (ref 0.45–4.5)

## 2022-05-02 ENCOUNTER — OFFICE VISIT (OUTPATIENT)
Dept: ENDOCRINOLOGY | Facility: MEDICAL CENTER | Age: 72
End: 2022-05-02
Payer: MEDICARE

## 2022-05-02 VITALS
BODY MASS INDEX: 31.42 KG/M2 | DIASTOLIC BLOOD PRESSURE: 80 MMHG | WEIGHT: 232 LBS | HEIGHT: 72 IN | OXYGEN SATURATION: 97 % | HEART RATE: 66 BPM | SYSTOLIC BLOOD PRESSURE: 132 MMHG

## 2022-05-02 DIAGNOSIS — D75.1 POLYCYTHEMIA, SECONDARY: ICD-10-CM

## 2022-05-02 DIAGNOSIS — E29.1 PRIMARY TESTICULAR HYPOGONADISM: ICD-10-CM

## 2022-05-02 DIAGNOSIS — E28.0 ESTRADIOL EXCESS: ICD-10-CM

## 2022-05-02 DIAGNOSIS — T38.0X5A STEROID-INDUCED OSTEOPOROSIS: ICD-10-CM

## 2022-05-02 DIAGNOSIS — S32.000D COMPRESSION FRACTURE OF LUMBAR VERTEBRA WITH ROUTINE HEALING, UNSPECIFIED LUMBAR VERTEBRAL LEVEL, SUBSEQUENT ENCOUNTER: ICD-10-CM

## 2022-05-02 DIAGNOSIS — M81.8 STEROID-INDUCED OSTEOPOROSIS: ICD-10-CM

## 2022-05-02 DIAGNOSIS — E27.40 ADRENAL INSUFFICIENCY (HCC): ICD-10-CM

## 2022-05-02 DIAGNOSIS — E03.9 HYPOTHYROIDISM, UNSPECIFIED TYPE: ICD-10-CM

## 2022-05-02 DIAGNOSIS — E55.9 VITAMIN D DEFICIENCY: ICD-10-CM

## 2022-05-02 PROCEDURE — 99211 OFF/OP EST MAY X REQ PHY/QHP: CPT

## 2022-05-02 PROCEDURE — 99214 OFFICE O/P EST MOD 30 MIN: CPT

## 2022-05-02 RX ORDER — TESTOSTERONE CYPIONATE 200 MG/ML
100 INJECTION, SOLUTION INTRAMUSCULAR
Qty: 4.5 ML | Refills: 1 | Status: SHIPPED | OUTPATIENT
Start: 2022-05-02 | End: 2022-07-31

## 2022-05-02 RX ORDER — LEVOTHYROXINE SODIUM 0.15 MG/1
150 TABLET ORAL DAILY
Qty: 90 TABLET | Refills: 3 | Status: SHIPPED | OUTPATIENT
Start: 2022-05-02 | End: 2022-10-18 | Stop reason: SDUPTHER

## 2022-05-02 RX ORDER — SYRINGE WITH NEEDLE, 1 ML 25GX5/8"
SYRINGE, EMPTY DISPOSABLE MISCELLANEOUS
Qty: 30 EACH | Refills: 6 | Status: SHIPPED | OUTPATIENT
Start: 2022-05-02 | End: 2022-12-12

## 2022-05-02 RX ORDER — HYDROCORTISONE 10 MG/1
10 TABLET ORAL 4 TIMES DAILY
Qty: 336 TABLET | Refills: 2 | Status: SHIPPED | OUTPATIENT
Start: 2022-05-02 | End: 2022-10-18 | Stop reason: SDUPTHER

## 2022-05-02 RX ORDER — SYRINGE WITH NEEDLE, 1 ML 25GX5/8"
SYRINGE, EMPTY DISPOSABLE MISCELLANEOUS
Qty: 6 EACH | Refills: 2 | Status: SHIPPED | OUTPATIENT
Start: 2022-05-02 | End: 2023-04-18 | Stop reason: SDUPTHER

## 2022-05-02 ASSESSMENT — FIBROSIS 4 INDEX: FIB4 SCORE: 3.25

## 2022-05-02 NOTE — PROGRESS NOTES
Chief Complaint   Patient presents with   • Establish Care        HPI: Maykel is a 71 year old male here to establish care for the following issues  He was a patient of Dr. Manrique.      1. Primary testicular hypogonadism  Etiology unknown but well documented with low testosterone and elevated LH levels  Satisfied with current testosterone replacement of 80 mg IM every 10 days.  Testosterone is helpful with normal libido, and energy levels     Ref. Range 4/25/2022 07:36   Testosterone,Total Latest Units: ng/dL 309     2. Polycythemia, secondary  Normal testosterone levels  History of COPD with nocturnal oxygen  Sleep apnea has been ruled out     Ref. Range 4/25/2022 07:36   Hemoglobin Latest Ref Range: 13.0 - 17.7 g/dL 17.7   Hematocrit Latest Ref Range: 37.5 - 51.0 % 53.3 (H)       3. Hypothyroidism, unspecified type  Clinically euthyroid taking levothyroxine 150 mcg/day.    Taking his thyroid hormone replacement prior to breakfast, on an empty stomach  Denies taking iron, calcium  Takes antiacid later in the morning  Denies palpitation, tremors, fatigue     Ref. Range 4/25/2022 07:36   TSH Latest Ref Range: 0.450 - 4.500 uIU/mL 2.440   Free T-4 Latest Ref Range: 0.82 - 1.77 ng/dL 1.72       4. Estradiol excess  Denies breast tenderness or gynecomastia.     Ref. Range 4/25/2022 07:36   Estradiol-E2 Latest Ref Range: 7.6 - 42.6 pg/mL 18.6     5. Adrenal insufficiency (HCC)  Diagnosed condition by Dr. Manrique, possibly related to previous metastatic melanoma for which he took, steroids for more than 6 months  Currently taking hydrocortisone 20 mg in the morning and 10 mg in the afternoon  He takes an extra 10 mg dose for the stress                Ref. Range 4/25/2022 07:36   ACTH Plasma Latest Ref Range: 7.2 - 63.3 pg/mL <1.5 (L)       6. Compression fracture of lumbar vertebra, unspecified lumbar vertebral level, sequela  Occurred a year ago when he was lifting a heavy log.    His back suddenly developed excruciating  "pain.    He was identified as having a compression fracture of one of his vertebrae which I did not know about he only had a recent CAT scan apparently has demonstrated.      7.  Steroid-induced osteoporosis:  The presumptive diagnosis based on  #6 above.    Bone density scan was ordered by Dr. Manrique, but it has not been done yet    8.  Vitamin D deficiency:  Currently not taking any vitamin D     Ref. Range 4/25/2022 07:36   25-Hydroxy   Vitamin D 25 Latest Ref Range: 30.0 - 100.0 ng/mL 22.3 (L)       ROS:  Feeling very well with no additional complaints.      Allergies:   Allergies   Allergen Reactions   • Sulfa Drugs      Severe muscle pains       Current medicines including changes today:  Current Outpatient Medications   Medication Sig Dispense Refill   • hydrocortisone (CORTEF) 10 MG Tab take 2 tablets by mouth twice a day 120 Tablet 5   • DILTIAZem CD (CARDIZEM CD) 120 MG CAPSULE SR 24 HR Take 1 Capsule by mouth every day. 90 Capsule 1   • PROAIR  (90 Base) MCG/ACT Aero Soln inhalation aerosol Inhale 1-2 Puffs every four hours as needed for Shortness of Breath. 18 g 3   • Fluticasone-Umeclidin-Vilant (TRELEGY ELLIPTA) 100-62.5-25 MCG/INH AEROSOL POWDER, BREATH ACTIVATED inhalation Inhale 1 Inhalation every day. 3 Each 3   • B-D 3CC LUER-BO SYR 23GX1\" 23G X 1\" 3 ML Misc use as directed 6 Each 2   • SYRINGE-NEEDLE, DISP, 3 ML (B-D 3CC LUER-BO SYR 42HI1-9/2) 23G X 1-1/2\" 3 ML Misc BD Luer-Bo Syringe 3 mL 23 x 1\"   use as directed     • acetaminophen (TYLENOL) 325 MG Tab acetaminophen 325 mg tablet   take 2 tablets by mouth three times a day if needed for pain for 5 days     • diltiazem (TIAZAC) 120 MG SR capsule diltiazem  mg capsule,extended release 24 hr   take 1 capsule by mouth once daily     • tamsulosin (FLOMAX) 0.4 MG capsule tamsulosin 0.4 mg capsule   Take 2 capsules every day by oral route at bedtime.   hold if having dizziness when standing up     • levothyroxine (SYNTHROID) 150 MCG " Tab take 1 tablet by mouth once daily 90 tablet 3   • rivaroxaban (XARELTO) 20 MG Tab tablet Take 1 tablet by mouth with dinner. 90 tablet 11   • flecainide (TAMBOCOR) 100 MG Tab Take 1 tablet by mouth 2 times a day. 180 tablet 3   • felodipine (PLENDIL) 2.5 MG TABLET SR 24 HR Take 1 tablet by mouth every day. 90 tablet 3   • HYDROcodone-acetaminophen (NORCO) 7.5-325 MG per tablet Take 1-2 Tabs by mouth every 8 hours as needed.     • tamsulosin (FLOMAX) 0.4 MG capsule Take 0.4 mg by mouth ONE-HALF HOUR AFTER BREAKFAST. (Patient not taking: Reported on 5/2/2022)     • simvastatin (ZOCOR) 20 MG TABS Take 20 mg by mouth every morning.     • omeprazole (PRILOSEC) 20 MG CPDR Take 20 mg by mouth every day.       No current facility-administered medications for this visit.        Past Medical History:   Diagnosis Date   • Adrenal disorder (HCC)    • Arrhythmia    • Breath shortness    • Cancer (HCC)     melanoma in 2007, original 1989 from back of neck (chemo)   • Cataract    • Emphysema of lung (HCC)    • Heart burn    • Hypertension    • Indigestion    • Metastatic melanoma (HCC) 1990 / 2012    original neck lesion resected 1990 / recurrent lesion resected 2007 / third lesion resected 2012 /recurrence in 2016   • Thyroid disease        PHYSICAL EXAM:    /80 (BP Location: Left arm, Patient Position: Sitting, BP Cuff Size: Large adult)   Pulse 66   Ht 1.829 m (6')   Wt 105 kg (232 lb)   SpO2 97%   BMI 31.46 kg/m²     Gen. overweight but otherwise appears healthy             No cushingoid features    Skin   appropriate for sex and age             No ecchymoses, purpura, thin skin or abdominal striae consistent with excessive cortisol effect    HEENT  unremarkable    Neck   no adenopathy, I cannot feel his thyroid.    Breasts    normal male fatty tissue.  No gynecomastia    Heart  regular    Extremities  no edema    Neuro  gait and station normal    Psych  appropriate      ASSESSMENT AND RECOMMENDATIONS  1.  "Primary testicular hypogonadism  stable  We will continue treatment with testosterone cypionate 100 mg every 10 days    Please do the following blood work before your next appointment  - Comp Metabolic Panel; Future  - Testosterone, Free & Total, Adult Male (w/SHBG); Future    Prescription sent to pharmacy  - SYRINGE-NEEDLE, DISP, 3 ML (B-D 3CC LUER-BO SYR 79RT6-6/2) 23G X 1-1/2\" 3 ML Misc; BD Luer-Bo Syringe 3 mL 23 x 1\" use as directed  Dispense: 30 Each; Refill: 6  - SYRINGE-NEEDLE, DISP, 3 ML (B-D 3CC LUER-BO SYR 23GX1\") 23G X 1\" 3 ML Misc; use as directed  Dispense: 6 Each; Refill: 2  - testosterone cypionate (DEPO-TESTOSTERONE) 200 MG/ML Solution injection; Inject 0.5 mL into the shoulder, thigh, or buttocks every 10 days for 90 days.  Dispense: 4.5 mL; Refill: 1    2. Polycythemia, secondary  Unstable  Elevated levels possibly due to history of COPD and nocturnal oxygen    - HEMOGLOBIN AND HEMATOCRIT; Future    3. Hypothyroidism, unspecified type  Clinically stable  Biochemically stable  We will continue same regimen    - TSH; Future  - FREE THYROXINE; Future  - Comp Metabolic Panel; Future    Prescription faxed to pharmacy  - levothyroxine (SYNTHROID) 150 MCG Tab; Take 1 Tablet by mouth every day.  Dispense: 90 Tablet; Refill: 3    4. Estradiol excess  Stable     - ESTRADIOL; Future    5. Adrenal insufficiency (HCC)  stable  Continue regimen  - ACTH; Future  - Comp Metabolic Panel; Future    Prescription sent to pharmacy  - hydrocortisone (CORTEF) 10 MG Tab; Take 1 Tablet by mouth 4 times a day.  Dispense: 336 Tablet; Refill: 2    6. Compression fracture of lumbar vertebra with routine healing, unspecified lumbar vertebral level, subsequent encounter  Unstable  Will order DEXA scan    7. Steroid-induced osteoporosis  Unstable  Will order DEXA scan to evaluate    - Comp Metabolic Panel; Future  - DS-BONE DENSITY STUDY (DEXA); Future    8. Vitamin D deficiency  Unstable  Advised to take 4000 IUs of vitamin " D daily  - VITAMIN D,25 HYDROXY; Future  - Comp Metabolic Panel; Future      Disposition: Make an appointment to follow-up in 6 months  Do your blood work and a schedule imaging before your next appointment      DARLING Herman.    Copies to: RAFFI Wylie.-C. [phone]

## 2022-07-03 DIAGNOSIS — I48.91 ATRIAL FIBRILLATION WITH RAPID VENTRICULAR RESPONSE (HCC): ICD-10-CM

## 2022-07-03 DIAGNOSIS — Z79.899 HIGH RISK MEDICATION USE: ICD-10-CM

## 2022-07-07 RX ORDER — RIVAROXABAN 20 MG/1
TABLET, FILM COATED ORAL
Qty: 90 TABLET | Refills: 0 | Status: SHIPPED | OUTPATIENT
Start: 2022-07-07 | End: 2022-07-08 | Stop reason: SDUPTHER

## 2022-07-08 ENCOUNTER — OFFICE VISIT (OUTPATIENT)
Dept: CARDIOLOGY | Facility: MEDICAL CENTER | Age: 72
End: 2022-07-08
Payer: MEDICARE

## 2022-07-08 VITALS
BODY MASS INDEX: 31.29 KG/M2 | HEART RATE: 64 BPM | SYSTOLIC BLOOD PRESSURE: 110 MMHG | OXYGEN SATURATION: 95 % | WEIGHT: 231 LBS | HEIGHT: 72 IN | DIASTOLIC BLOOD PRESSURE: 66 MMHG | RESPIRATION RATE: 16 BRPM

## 2022-07-08 DIAGNOSIS — I48.0 PAROXYSMAL ATRIAL FIBRILLATION (HCC): ICD-10-CM

## 2022-07-08 DIAGNOSIS — Z79.899 HIGH RISK MEDICATION USE: ICD-10-CM

## 2022-07-08 DIAGNOSIS — Z72.0 TOBACCO ABUSE: ICD-10-CM

## 2022-07-08 DIAGNOSIS — I10 PRIMARY HYPERTENSION: ICD-10-CM

## 2022-07-08 DIAGNOSIS — R73.03 PREDIABETES: ICD-10-CM

## 2022-07-08 DIAGNOSIS — I10 ESSENTIAL HYPERTENSION: ICD-10-CM

## 2022-07-08 DIAGNOSIS — I48.91 ATRIAL FIBRILLATION WITH RAPID VENTRICULAR RESPONSE (HCC): ICD-10-CM

## 2022-07-08 PROBLEM — I25.9 CHEST PAIN DUE TO MYOCARDIAL ISCHEMIA: Status: RESOLVED | Noted: 2017-11-06 | Resolved: 2022-07-08

## 2022-07-08 LAB — EKG IMPRESSION: NORMAL

## 2022-07-08 PROCEDURE — 93000 ELECTROCARDIOGRAM COMPLETE: CPT | Performed by: STUDENT IN AN ORGANIZED HEALTH CARE EDUCATION/TRAINING PROGRAM

## 2022-07-08 PROCEDURE — 99214 OFFICE O/P EST MOD 30 MIN: CPT | Performed by: NURSE PRACTITIONER

## 2022-07-08 RX ORDER — FELODIPINE 2.5 MG/1
2.5 TABLET, EXTENDED RELEASE ORAL DAILY
Qty: 90 TABLET | Refills: 3 | Status: SHIPPED | OUTPATIENT
Start: 2022-07-08 | End: 2023-07-10 | Stop reason: SDUPTHER

## 2022-07-08 RX ORDER — FLECAINIDE ACETATE 100 MG/1
100 TABLET ORAL 2 TIMES DAILY
Qty: 180 TABLET | Refills: 3 | Status: SHIPPED | OUTPATIENT
Start: 2022-07-08 | End: 2023-07-10 | Stop reason: SDUPTHER

## 2022-07-08 RX ORDER — DILTIAZEM HYDROCHLORIDE 120 MG/1
120 CAPSULE, COATED, EXTENDED RELEASE ORAL DAILY
Qty: 90 CAPSULE | Refills: 3 | Status: SHIPPED | OUTPATIENT
Start: 2022-07-08 | End: 2023-07-10 | Stop reason: SDUPTHER

## 2022-07-08 RX ORDER — OSELTAMIVIR PHOSPHATE 75 MG/1
75 CAPSULE ORAL 2 TIMES DAILY
COMMUNITY
Start: 2022-06-24 | End: 2022-10-18

## 2022-07-08 RX ORDER — NICOTINE 21 MG/24HR
PATCH, TRANSDERMAL 24 HOURS TRANSDERMAL
COMMUNITY
Start: 2022-06-24 | End: 2023-11-21

## 2022-07-08 RX ORDER — NICOTINE 21 MG/24HR
PATCH, TRANSDERMAL 24 HOURS TRANSDERMAL
COMMUNITY
Start: 2022-06-24 | End: 2022-10-18

## 2022-07-08 ASSESSMENT — ENCOUNTER SYMPTOMS
SHORTNESS OF BREATH: 1
DIZZINESS: 0
COUGH: 0
ABDOMINAL PAIN: 0
ORTHOPNEA: 0
SPUTUM PRODUCTION: 1
PND: 0
CLAUDICATION: 0
FEVER: 0
PALPITATIONS: 0
MYALGIAS: 0

## 2022-07-08 ASSESSMENT — FIBROSIS 4 INDEX: FIB4 SCORE: 3.3

## 2022-07-08 NOTE — PATIENT INSTRUCTIONS
I refilled all your heart medications for 1 year.    We will see you back next year with labs and an EKG.    Always call for concerns or symptoms.

## 2022-07-08 NOTE — PROGRESS NOTES
Chief Complaint   Patient presents with   • Atrial Fibrillation   • Hypertension     Subjective     Maykel Joseph is a 72 y.o. male who presents today for annual follow up for PAF on chronic anticoagulation needing refills.    He is a patient of Dr. Ramirez in our office. Hx of PAF with chronic anticoagulation, tobacco abuse, HTN, and pre-diabetes.    He lives in [place], [place]. He just had a hospital admission for flu related pneumonia, he remains on oxygen for this.    He has shortness of breath and a cough with congestion but its clear now.     He continues to smoke still but is working on quitting smoking with the nicotine patch.    He has no chest pain, edema, dizziness/lightheadedness, or palpitations.     Past Medical History:   Diagnosis Date   • Adrenal disorder (HCC)    • Arrhythmia    • Breath shortness    • Cancer (HCC)     melanoma in 2007, original 1989 from back of neck (chemo)   • Cataract    • Emphysema of lung (HCC)    • Heart burn    • Hypertension    • Indigestion    • Metastatic melanoma (HCC) 1990 / 2012    original neck lesion resected 1990 / recurrent lesion resected 2007 / third lesion resected 2012 /recurrence in 2016   • Thyroid disease      Past Surgical History:   Procedure Laterality Date   • CATARACT PHACO WITH IOL Left 11/6/2018    Procedure: CATARACT PHACO WITH IOL;  Surgeon: Shravan España M.D.;  Location: SURGERY SAME DAY [facility];  Service: Ophthalmology   • CATARACT PHACO WITH IOL Right 10/16/2018    Procedure: CATARACT PHACO WITH IOL;  Surgeon: Shravan España M.D.;  Location: SURGERY SAME DAY [facility];  Service: Ophthalmology   • COLONOSCOPY - ENDO  10/9/2016    Procedure: COLONOSCOPY - ENDO;  Surgeon: William Mitchell M.D.;  Location: SURGERY [facility];  Service:    • RECOVERY  7/26/2016    Procedure: CT-CT Guided abdominal mass-;  Surgeon: Recoveryonairam Surgery;  Location: SURGERY PRE-POST PROC UNIT [facility];  Service:    • MASS EXCISION GENERAL  3/9/2012     Performed by MARLEN KEEN at SURGERY SAME DAY [facility] ORS   • OTHER      lymph nodes back of neck     Family History   Problem Relation Age of Onset   • Heart Disease Mother    • Cancer Father    • Heart Disease Maternal Grandmother    • Heart Disease Maternal Grandfather    • Cancer Paternal Grandfather      Social History     Socioeconomic History   • Marital status:      Spouse name: Not on file   • Number of children: Not on file   • Years of education: Not on file   • Highest education level: Not on file   Occupational History   • Not on file   Tobacco Use   • Smoking status: Current Every Day Smoker     Packs/day: 1.00     Years: 40.00     Pack years: 40.00     Types: Cigarettes   • Smokeless tobacco: Never Used   • Tobacco comment: did vape, but has not quit 10/2019, 1/2-1 PPD   Vaping Use   • Vaping Use: Former   • Substances: Nicotine   • Passive vaping exposure: Yes   Substance and Sexual Activity   • Alcohol use: Yes     Comment: 1 drink per month    • Drug use: No   • Sexual activity: Not on file   Other Topics Concern   • Not on file   Social History Narrative   • Not on file     Social Determinants of Health     Financial Resource Strain: Not on file   Food Insecurity: Not on file   Transportation Needs: Not on file   Physical Activity: Not on file   Stress: Not on file   Social Connections: Not on file   Intimate Partner Violence: Not on file   Housing Stability: Not on file     Allergies   Allergen Reactions   • Sulfa Drugs      Severe muscle pains     Outpatient Encounter Medications as of 7/8/2022   Medication Sig Dispense Refill   • rivaroxaban (XARELTO) 20 MG Tab tablet Take 1 Tablet by mouth with dinner. 90 Tablet 3   • felodipine ER (PLENDIL) 2.5 MG TABLET SR 24 HR Take 1 Tablet by mouth every day. 90 Tablet 3   • DILTIAZem CD (CARDIZEM CD) 120 MG CAPSULE SR 24 HR Take 1 Capsule by mouth every day. 90 Capsule 3   • flecainide (TAMBOCOR) 100 MG Tab Take 1 Tablet by mouth 2 times a  "day. 180 Tablet 3   • hydrocortisone (CORTEF) 10 MG Tab Take 1 Tablet by mouth 4 times a day. 336 Tablet 2   • levothyroxine (SYNTHROID) 150 MCG Tab Take 1 Tablet by mouth every day. 90 Tablet 3   • testosterone cypionate (DEPO-TESTOSTERONE) 200 MG/ML Solution injection Inject 0.5 mL into the shoulder, thigh, or buttocks every 10 days for 90 days. 4.5 mL 1   • PROAIR  (90 Base) MCG/ACT Aero Soln inhalation aerosol Inhale 1-2 Puffs every four hours as needed for Shortness of Breath. 18 g 3   • Fluticasone-Umeclidin-Vilant (TRELEGY ELLIPTA) 100-62.5-25 MCG/INH AEROSOL POWDER, BREATH ACTIVATED inhalation Inhale 1 Inhalation every day. 3 Each 3   • acetaminophen (TYLENOL) 325 MG Tab acetaminophen 325 mg tablet   take 2 tablets by mouth three times a day if needed for pain for 5 days     • tamsulosin (FLOMAX) 0.4 MG capsule tamsulosin 0.4 mg capsule   Take 2 capsules every day by oral route at bedtime.   hold if having dizziness when standing up     • simvastatin (ZOCOR) 20 MG TABS Take 20 mg by mouth every morning.     • omeprazole (PRILOSEC) 20 MG CPDR Take 20 mg by mouth every day.     • nicotine (NICODERM) 14 MG/24HR PATCH 24 HR apply 1 patch TO CLEAN, DRY, AND INTACT SKIN REMOVE AND REPLACE once daily     • nicotine (NICODERM) 7 MG/24HR PATCH 24 HR apply 1 patch TO SKIN once daily for 14 days     • RA NICOTINE 21 MG/24HR PATCH 24 HR apply 1 patch to CLEAN, DRY, AND INTACT SKIN REMOVE AND REPLACE once daily     • oseltamivir (TAMIFLU) 75 MG Cap Take 75 mg by mouth 2 times a day.     • [DISCONTINUED] XARELTO 20 MG Tab tablet take 1 tablet by mouth once daily WITH DINNER 90 Tablet 0   • SYRINGE-NEEDLE, DISP, 3 ML (B-D 3CC LUER-BO SYR 21OX2-5/2) 23G X 1-1/2\" 3 ML Misc BD Luer-Bo Syringe 3 mL 23 x 1\" use as directed 30 Each 6   • SYRINGE-NEEDLE, DISP, 3 ML (B-D 3CC LUER-BO SYR 23GX1\") 23G X 1\" 3 ML Misc use as directed 6 Each 2   • [DISCONTINUED] DILTIAZem CD (CARDIZEM CD) 120 MG CAPSULE SR 24 HR Take 1 Capsule " by mouth every day. 90 Capsule 1   • [DISCONTINUED] diltiazem (TIAZAC) 120 MG SR capsule diltiazem  mg capsule,extended release 24 hr   take 1 capsule by mouth once daily     • [DISCONTINUED] flecainide (TAMBOCOR) 100 MG Tab Take 1 tablet by mouth 2 times a day. 180 tablet 3   • [DISCONTINUED] felodipine (PLENDIL) 2.5 MG TABLET SR 24 HR Take 1 tablet by mouth every day. 90 tablet 3   • [DISCONTINUED] HYDROcodone-acetaminophen (NORCO) 7.5-325 MG per tablet Take 1-2 Tabs by mouth every 8 hours as needed. (Patient not taking: Reported on 7/8/2022)       No facility-administered encounter medications on file as of 7/8/2022.     Review of Systems   Constitutional: Negative for fever and malaise/fatigue.   Respiratory: Positive for sputum production and shortness of breath. Negative for cough.         Just recovering from Flu   Cardiovascular: Negative for chest pain, palpitations, orthopnea, claudication, leg swelling and PND.   Gastrointestinal: Negative for abdominal pain.   Musculoskeletal: Negative for myalgias.   Neurological: Negative for dizziness.              Objective     /66 (BP Location: Left arm, Patient Position: Sitting, BP Cuff Size: Adult)   Pulse 64   Resp 16   Ht 1.829 m (6')   Wt 105 kg (231 lb)   SpO2 95%   BMI 31.33 kg/m²     Physical Exam  Vitals and nursing note reviewed.   Constitutional:       Appearance: Normal appearance. He is well-developed. He is obese.   HENT:      Head: Normocephalic and atraumatic.   Neck:      Vascular: No JVD.   Cardiovascular:      Rate and Rhythm: Normal rate and regular rhythm.      Pulses: Normal pulses.      Heart sounds: Normal heart sounds.   Pulmonary:      Effort: Pulmonary effort is normal.      Breath sounds: Normal breath sounds.   Musculoskeletal:         General: Normal range of motion.   Skin:     General: Skin is warm and dry.      Capillary Refill: Capillary refill takes less than 2 seconds.   Neurological:      General: No focal  deficit present.      Mental Status: He is alert and oriented to person, place, and time. Mental status is at baseline.   Psychiatric:         Mood and Affect: Mood normal.         Behavior: Behavior normal.         Thought Content: Thought content normal.         Judgment: Judgment normal.                Assessment & Plan     1. Primary hypertension     2. Prediabetes     3. Atrial fibrillation with rapid ventricular response (HCC)  rivaroxaban (XARELTO) 20 MG Tab tablet    DILTIAZem CD (CARDIZEM CD) 120 MG CAPSULE SR 24 HR    flecainide (TAMBOCOR) 100 MG Tab    EKG    EKG   4. Tobacco abuse     5. High risk medication use  rivaroxaban (XARELTO) 20 MG Tab tablet   6. Essential hypertension  felodipine ER (PLENDIL) 2.5 MG TABLET SR 24 HR    DILTIAZem CD (CARDIZEM CD) 120 MG CAPSULE SR 24 HR     Medical Decision Making: Today's Assessment/Status/Plan:      1. HTN  -good control on diltiazem and felodipine  -BP goal <130/80  -follow at home  -all meds refilled    2. PAF on chronic anticoagulation  -rate controlled and asymptomatic  -cont flecainide and diltiazem  -EKG stable  -all meds refilled    3. Tobacco abuse  -recommend smoking cessation  -he continues to work on this    4. HLD  -statin  -managed by PCP    Patient is to follow up with Rekha CALIX or Dr. Ramirez in 1 year with annual labs with PCP and EKG.

## 2022-10-13 ENCOUNTER — TELEPHONE (OUTPATIENT)
Dept: CARDIOLOGY | Facility: MEDICAL CENTER | Age: 72
End: 2022-10-13
Payer: MEDICARE

## 2022-10-13 NOTE — TELEPHONE ENCOUNTER
SC    Caller: Maykel    Office Name, phone number, fax number: [facility] / ph. [phone]    Fax clearance to [phone]    Procedure Name: Epidural    Procedure Scheduled Date: 10-    Callback Number: [phone]

## 2022-10-13 NOTE — TELEPHONE ENCOUNTER
Clearance received and discussed with SC who indicates patient may proceed with cervical interlaminar IVETTE and hold Xarelto 2 days prior. Clearance response faxed to [phone], receipt confirmed. Spoke to Bela at [facility] and gave update.     Left detailed message for patient with update and Xarelto instructions. Advised to return call with any additional questions.

## 2022-10-13 NOTE — TELEPHONE ENCOUNTER
Spoke to Bela at [facility], direct fax number provided. Will address clearance with SC when received.

## 2022-10-15 ENCOUNTER — HOSPITAL ENCOUNTER (OUTPATIENT)
Dept: RADIOLOGY | Facility: MEDICAL CENTER | Age: 72
End: 2022-10-15
Attending: INTERNAL MEDICINE
Payer: MEDICARE

## 2022-10-18 ENCOUNTER — OFFICE VISIT (OUTPATIENT)
Dept: ENDOCRINOLOGY | Facility: MEDICAL CENTER | Age: 72
End: 2022-10-18
Payer: MEDICARE

## 2022-10-18 VITALS
HEIGHT: 72 IN | OXYGEN SATURATION: 91 % | SYSTOLIC BLOOD PRESSURE: 138 MMHG | BODY MASS INDEX: 32.51 KG/M2 | HEART RATE: 94 BPM | DIASTOLIC BLOOD PRESSURE: 74 MMHG | WEIGHT: 240 LBS

## 2022-10-18 DIAGNOSIS — D75.1 POLYCYTHEMIA: ICD-10-CM

## 2022-10-18 DIAGNOSIS — T38.0X5A STEROID-INDUCED OSTEOPOROSIS: ICD-10-CM

## 2022-10-18 DIAGNOSIS — M81.8 STEROID-INDUCED OSTEOPOROSIS: ICD-10-CM

## 2022-10-18 DIAGNOSIS — E27.40 ADRENAL INSUFFICIENCY (HCC): ICD-10-CM

## 2022-10-18 DIAGNOSIS — E29.1 PRIMARY TESTICULAR HYPOGONADISM: ICD-10-CM

## 2022-10-18 DIAGNOSIS — S32.000S COMPRESSION FRACTURE OF LUMBAR VERTEBRA, UNSPECIFIED LUMBAR VERTEBRAL LEVEL, SEQUELA: ICD-10-CM

## 2022-10-18 DIAGNOSIS — E03.9 HYPOTHYROIDISM, ACQUIRED: ICD-10-CM

## 2022-10-18 DIAGNOSIS — E55.9 VITAMIN D DEFICIENCY: ICD-10-CM

## 2022-10-18 PROCEDURE — 99211 OFF/OP EST MAY X REQ PHY/QHP: CPT

## 2022-10-18 PROCEDURE — 99214 OFFICE O/P EST MOD 30 MIN: CPT

## 2022-10-18 RX ORDER — LEVOTHYROXINE SODIUM 0.15 MG/1
150 TABLET ORAL DAILY
Qty: 90 TABLET | Refills: 3 | Status: SHIPPED | OUTPATIENT
Start: 2022-10-18 | End: 2023-04-18 | Stop reason: SDUPTHER

## 2022-10-18 RX ORDER — TESTOSTERONE CYPIONATE 200 MG/ML
80 INJECTION, SOLUTION INTRAMUSCULAR
Qty: 4 ML | Refills: 2 | Status: SHIPPED | OUTPATIENT
Start: 2022-10-18 | End: 2023-01-16

## 2022-10-18 RX ORDER — HYDROCORTISONE 10 MG/1
10 TABLET ORAL 4 TIMES DAILY
Qty: 336 TABLET | Refills: 2 | Status: SHIPPED | OUTPATIENT
Start: 2022-10-18 | End: 2023-04-18 | Stop reason: SDUPTHER

## 2022-10-18 ASSESSMENT — FIBROSIS 4 INDEX: FIB4 SCORE: 3.3

## 2022-10-18 NOTE — PROGRESS NOTES
HPI: Maykel is a 71 year old male here to establish care for the following issues  He was a patient of Dr. Manrique.      1. Primary testicular hypogonadism  Etiology unknown but well documented with low testosterone and elevated LH levels  current testosterone replacement of testosterone cypionate 80 mg IM every 10 days.  Testosterone is helpful with normal libido, and energy levels    Blood work done at [facility] on 10/7/2022 showed  Total testosterone at 263 (250-1100)     Ref. Range 4/25/2022 07:36   Testosterone,Total Latest Units: ng/dL 309     2. Polycythemia, secondary  Normal testosterone levels  History of COPD with nocturnal oxygen-2L   Sleep apnea has been ruled out    Blood work done at [facility] on 10/7/2022 showed  Hemoglobin at 17.3 (13.5-17)  Hematocrit of 51% (40.0-53)     Ref. Range 4/25/2022 07:36   Hemoglobin Latest Ref Range: 13.0 - 17.7 g/dL 17.7   Hematocrit Latest Ref Range: 37.5 - 51.0 % 53.3 (H)       3. Hypothyroidism, unspecified type  Clinically euthyroid taking levothyroxine 150 mcg/day.    Taking his thyroid hormone replacement prior to breakfast, on an empty stomach  Denies taking iron, calcium  Takes antiacid later in the morning  Denies taking biotin or multivatims     Denies palpitation, tremors, fatigue     Ref. Range 4/25/2022 07:36   TSH Latest Ref Range: 0.450 - 4.500 uIU/mL 2.440   Free T-4 Latest Ref Range: 0.82 - 1.77 ng/dL 1.72       4. Adrenal insufficiency (HCC)  Diagnosed condition by Dr. Manrique, possibly related to previous metastatic melanoma for which he took, steroids for more than 6 months  Currently taking hydrocortisone 20 mg in the morning and 10 mg in the afternoon  He takes an extra 10 mg dose for the stress              Blood work done at [facility] on 10/7/2022 showed  ACTH <5 (6-50)     Ref. Range 4/25/2022 07:36   ACTH Plasma Latest Ref Range: 7.2 - 63.3 pg/mL <1.5 (L)       5. Compression fracture of lumbar vertebra, unspecified lumbar vertebral  level, sequela  Occurred over year ago when he was lifting a heavy log.    His back suddenly developed excruciating pain.    He was identified as having a compression fracture of one of his vertebrae which I did not know about he only had a recent CAT scan apparently has demonstrated.      6.  Steroid-induced osteoporosis:  The presumptive diagnosis based on  #6 above.    Bone density scan was ordered by Dr. Manrique, patient did his bone density scan at [place] I do not have results-I will request records    7.  Vitamin D deficiency:  Currently taking 1000IUs   Blood work done at [facility] on 10/7/2022 showed  Vitamin D, 25 total 36.4 (25-80)     Ref. Range 4/25/2022 07:36   25-Hydroxy   Vitamin D 25 Latest Ref Range: 30.0 - 100.0 ng/mL 22.3 (L)       ROS:  Feeling very well with no additional complaints.      Allergies:   Allergies   Allergen Reactions    Sulfa Drugs      Severe muscle pains       Current medicines including changes today:  Current Outpatient Medications   Medication Sig Dispense Refill    nicotine (NICODERM) 14 MG/24HR PATCH 24 HR apply 1 patch TO CLEAN, DRY, AND INTACT SKIN REMOVE AND REPLACE once daily      nicotine (NICODERM) 7 MG/24HR PATCH 24 HR apply 1 patch TO SKIN once daily for 14 days      RA NICOTINE 21 MG/24HR PATCH 24 HR apply 1 patch to CLEAN, DRY, AND INTACT SKIN REMOVE AND REPLACE once daily      oseltamivir (TAMIFLU) 75 MG Cap Take 75 mg by mouth 2 times a day.      rivaroxaban (XARELTO) 20 MG Tab tablet Take 1 Tablet by mouth with dinner. 90 Tablet 3    felodipine ER (PLENDIL) 2.5 MG TABLET SR 24 HR Take 1 Tablet by mouth every day. 90 Tablet 3    DILTIAZem CD (CARDIZEM CD) 120 MG CAPSULE SR 24 HR Take 1 Capsule by mouth every day. 90 Capsule 3    flecainide (TAMBOCOR) 100 MG Tab Take 1 Tablet by mouth 2 times a day. 180 Tablet 3    hydrocortisone (CORTEF) 10 MG Tab Take 1 Tablet by mouth 4 times a day. 336 Tablet 2    levothyroxine (SYNTHROID) 150 MCG Tab Take 1 Tablet by  "mouth every day. 90 Tablet 3    SYRINGE-NEEDLE, DISP, 3 ML (B-D 3CC LUER-BO SYR 38JW7-3/2) 23G X 1-1/2\" 3 ML Misc BD Luer-Bo Syringe 3 mL 23 x 1\" use as directed 30 Each 6    SYRINGE-NEEDLE, DISP, 3 ML (B-D 3CC LUER-BO SYR 23GX1\") 23G X 1\" 3 ML Misc use as directed 6 Each 2    PROAIR  (90 Base) MCG/ACT Aero Soln inhalation aerosol Inhale 1-2 Puffs every four hours as needed for Shortness of Breath. 18 g 3    Fluticasone-Umeclidin-Vilant (TRELEGY ELLIPTA) 100-62.5-25 MCG/INH AEROSOL POWDER, BREATH ACTIVATED inhalation Inhale 1 Inhalation every day. 3 Each 3    acetaminophen (TYLENOL) 325 MG Tab acetaminophen 325 mg tablet   take 2 tablets by mouth three times a day if needed for pain for 5 days      tamsulosin (FLOMAX) 0.4 MG capsule tamsulosin 0.4 mg capsule   Take 2 capsules every day by oral route at bedtime.   hold if having dizziness when standing up      simvastatin (ZOCOR) 20 MG TABS Take 20 mg by mouth every morning.      omeprazole (PRILOSEC) 20 MG CPDR Take 20 mg by mouth every day.       No current facility-administered medications for this visit.        Past Medical History:   Diagnosis Date    Adrenal disorder (HCC)     Arrhythmia     Breath shortness     Cancer (HCC)     melanoma in 2007, original 1989 from back of neck (chemo)    Cataract     Emphysema of lung (HCC)     Heart burn     Hypertension     Indigestion     Metastatic melanoma (HCC) 1990 / 2012    original neck lesion resected 1990 / recurrent lesion resected 2007 / third lesion resected 2012 /recurrence in 2016    Thyroid disease        PHYSICAL EXAM:    /74   Pulse 94   Ht 1.829 m (6')   Wt 109 kg (240 lb)   SpO2 91%   BMI 32.55 kg/m²     Gen. overweight but otherwise appears healthy             No cushingoid features    Skin   appropriate for sex and age             No ecchymoses, purpura, thin skin or abdominal striae consistent with excessive cortisol effect    HEENT  unremarkable    Neck   no adenopathy, I cannot " feel his thyroid.    Breasts    normal male fatty tissue.  No gynecomastia    Heart  regular    Extremities  no edema    Neuro  gait and station normal    Psych  appropriate      ASSESSMENT AND RECOMMENDATIONS  1. Primary testicular hypogonadism  stable  We will continue treatment with testosterone cypionate 80 mg every 10 days  Please do the following blood work before your next appointment    - Testosterone, Free & Total, Adult Male (w/SHBG); Future  - Comp Metabolic Panel; Future  - testosterone cypionate (DEPO-TESTOSTERONE) 200 MG/ML Solution injection; Inject 0.4 mL into the shoulder, thigh, or buttocks every 10 days for 90 days.  Dispense: 4 mL; Refill: 2    2. Polycythemia  Improving  - HEMOGLOBIN AND HEMATOCRIT; Future    3. Hypothyroidism, acquired  Clinically stable  Biochemically still  We will continue stable/HPI  - TSH; Future  - FREE THYROXINE; Future  - levothyroxine (SYNTHROID) 150 MCG Tab; Take 1 Tablet by mouth every day.  Dispense: 90 Tablet; Refill: 3    4. Adrenal insufficiency (HCC)  Stable   Will continue same dose-see HPI  - ACTH; Future  - hydrocortisone (CORTEF) 10 MG Tab; Take 1 Tablet by mouth 4 times a day.  Dispense: 336 Tablet; Refill: 2    5. Compression fracture of lumbar vertebra, unspecified lumbar vertebral level, sequela  FV by pcp    6. Steroid-induced osteoporosis  Unstable  Patient declines D[facility] scan [facility], we do not have any records-I will request records    7. Vitamin D deficiency  Unstable but improving  Continue same regimen-see HPI      Disposition: Make an appointment to follow-up in 6 months  Do your blood work 2 weeks before your next appointment      Jerome Seymour A.P.R.N.  10/18/2022

## 2022-10-21 ENCOUNTER — TELEPHONE (OUTPATIENT)
Dept: ENDOCRINOLOGY | Facility: MEDICAL CENTER | Age: 72
End: 2022-10-21
Payer: MEDICARE

## 2022-10-21 NOTE — TELEPHONE ENCOUNTER
----- Message from DEYANIRA HermanRLuizNLuiz sent at 10/18/2022  4:27 PM PDT -----  Please request DEXA from [facility] thank you

## 2022-12-12 ENCOUNTER — OFFICE VISIT (OUTPATIENT)
Dept: SLEEP MEDICINE | Facility: MEDICAL CENTER | Age: 72
End: 2022-12-12
Payer: MEDICARE

## 2022-12-12 ENCOUNTER — NON-PROVIDER VISIT (OUTPATIENT)
Dept: SLEEP MEDICINE | Facility: MEDICAL CENTER | Age: 72
End: 2022-12-12
Attending: INTERNAL MEDICINE
Payer: MEDICARE

## 2022-12-12 VITALS
RESPIRATION RATE: 16 BRPM | OXYGEN SATURATION: 92 % | HEART RATE: 77 BPM | SYSTOLIC BLOOD PRESSURE: 124 MMHG | BODY MASS INDEX: 34.22 KG/M2 | DIASTOLIC BLOOD PRESSURE: 68 MMHG | WEIGHT: 239 LBS | HEIGHT: 70 IN

## 2022-12-12 VITALS — WEIGHT: 239 LBS | HEIGHT: 70 IN | BODY MASS INDEX: 34.22 KG/M2

## 2022-12-12 DIAGNOSIS — Z23 NEED FOR VACCINATION: ICD-10-CM

## 2022-12-12 DIAGNOSIS — C43.9 METASTATIC MELANOMA (HCC): ICD-10-CM

## 2022-12-12 DIAGNOSIS — Z72.0 TOBACCO USE: ICD-10-CM

## 2022-12-12 DIAGNOSIS — J44.9 CHRONIC OBSTRUCTIVE PULMONARY DISEASE, UNSPECIFIED COPD TYPE (HCC): ICD-10-CM

## 2022-12-12 DIAGNOSIS — J96.11 CHRONIC RESPIRATORY FAILURE WITH HYPOXIA (HCC): ICD-10-CM

## 2022-12-12 PROCEDURE — 94010 BREATHING CAPACITY TEST: CPT | Performed by: INTERNAL MEDICINE

## 2022-12-12 PROCEDURE — G0009 ADMIN PNEUMOCOCCAL VACCINE: HCPCS | Performed by: INTERNAL MEDICINE

## 2022-12-12 PROCEDURE — 94729 DIFFUSING CAPACITY: CPT | Performed by: INTERNAL MEDICINE

## 2022-12-12 PROCEDURE — 99214 OFFICE O/P EST MOD 30 MIN: CPT | Mod: 25 | Performed by: INTERNAL MEDICINE

## 2022-12-12 PROCEDURE — 94761 N-INVAS EAR/PLS OXIMETRY MLT: CPT | Performed by: INTERNAL MEDICINE

## 2022-12-12 PROCEDURE — 94726 PLETHYSMOGRAPHY LUNG VOLUMES: CPT | Performed by: INTERNAL MEDICINE

## 2022-12-12 PROCEDURE — 90677 PCV20 VACCINE IM: CPT | Performed by: INTERNAL MEDICINE

## 2022-12-12 RX ORDER — FLUTICASONE FUROATE, UMECLIDINIUM BROMIDE AND VILANTEROL TRIFENATATE 100; 62.5; 25 UG/1; UG/1; UG/1
1 POWDER RESPIRATORY (INHALATION) DAILY
Qty: 33 EACH | Refills: 3 | Status: SHIPPED | OUTPATIENT
Start: 2022-12-12 | End: 2023-09-28

## 2022-12-12 ASSESSMENT — PULMONARY FUNCTION TESTS
FEV1_PERCENT_PREDICTED: 77
FEV1/FVC: 48
FEV1/FVC: 48
FVC_PREDICTED: 4.21
FEV1: 2.46
FEV1/FVC_PERCENT_LLN: 63
FEV1/FVC_PERCENT_PREDICTED: 76
FEV1_PREDICTED: 3.18
FEV1_LLN: 2.66
FVC_PERCENT_PREDICTED: 121
FEV1/FVC_PERCENT_PREDICTED: 64
FVC_LLN: 3.52
FEV1/FVC_PREDICTED: 76
FEV1/FVC_PERCENT_PREDICTED: 63
FVC: 5.1

## 2022-12-12 ASSESSMENT — FIBROSIS 4 INDEX
FIB4 SCORE: 3.3
FIB4 SCORE: 3.3

## 2022-12-12 ASSESSMENT — ENCOUNTER SYMPTOMS
SINUS PAIN: 0
DIAPHORESIS: 0
PALPITATIONS: 0
CLAUDICATION: 0
CHILLS: 0
HEMOPTYSIS: 0
SPUTUM PRODUCTION: 0
WEAKNESS: 0
EYE DISCHARGE: 0
DOUBLE VISION: 0
DEPRESSION: 0
BACK PAIN: 0
SORE THROAT: 0
SHORTNESS OF BREATH: 0
COUGH: 0
WHEEZING: 0
FEVER: 0
BLURRED VISION: 0
PHOTOPHOBIA: 0
CONSTIPATION: 0
DIARRHEA: 0
HEADACHES: 0
ORTHOPNEA: 0
STRIDOR: 0
EYE REDNESS: 0
NECK PAIN: 0
SPEECH CHANGE: 0
VOMITING: 0
EYE PAIN: 0
DIZZINESS: 0
FOCAL WEAKNESS: 0
FALLS: 0
TREMORS: 0
NAUSEA: 0
PND: 0
HEARTBURN: 0
MYALGIAS: 0
ABDOMINAL PAIN: 0
WEIGHT LOSS: 0

## 2022-12-12 NOTE — PROCEDURES
Multi-Ox Readings  Multi Ox #1 Room air   O2 sat % at rest 91   O2 sat % on exertion 86   O2 sat average on exertion     Multi Ox #2 2 LPM   O2 sat % at rest 94   O2 sat % on exertion 93   O2 sat average on exertion       Oxygen Use 2 (2-3 LPM)   Oxygen Frequency 24/7   Duration of need     Is the patient mobile within the home?     CPAP Use?     BIPAP Use?     Servo Titration

## 2022-12-12 NOTE — PROCEDURES
Tech: Michelle Marie, RT  Good patient effort & cooperation.  Test was performed on the [employer] Body Plethysmograph- Elite DX system.  The predicted sets used for Spirometry are GLI-2012, for Lung Volumes are ITS, and for DLCO is GLI 2017.  The results of this test meet the ATS standards for acceptability and repeatability.  The DLCO was uncorrected for Hb.  A bronchodilator of Ventolin HFA 2 puffs via spacer was administered.  DLCO was performed during dilation period. No post FVC due to time restriction.    Interpretation:   Baseline spirometry shows airflow obstruction with FEV1/FVC ratio 48 and FEV1 of 2.46 L or 77% predicted.  No bronchodilator testing was performed.  Lung volumes are within normal limits.  Diffusion capacity is reduced at 65% predicted.  Pulmonary function testing shows mild airflow obstruction with reduced DLCO consistent with stated diagnosis of COPD.

## 2022-12-13 NOTE — PROGRESS NOTES
Chief Complaint   Patient presents with    COPD     Last seen 12/16/21     Results     PFT 12/12/2022         HPI: This patient is a 72 y.o. male whom is followed in our clinic for COPD last seen by me on 12/16/21.  The patient is an active tobacco user with greater than 40-pack-year history who was referred to us after onset of difficulty breathing with associated cough and shortness of breath around the time he was undergoing immunologic therapy for metastatic melanoma.  Pulmonary function testing from 2018 showed FEV1 of 2.42 L or 72% predicted with FEV1/FVC ratio 52.  He did have significant bronchodilator improvement and DLCO was low normal at 80% predicted.  Prior to our last clinic visit, he was using supplemental oxygen at night only.  COPD regimen includes Trelegy 100 and short acting bronchodilators as needed. He is monitored with surveillance imaging with oncology for history of metastatic melanoma and most recent imaging from October of this year showed stable emphysematous changes with some peripheral, interlobular septal thickening in the lingula and right middle lobe unchanged from prior.  Since his last clinic visit, he was diagnosed with influenza A in June and hospitalized for acute hypoxic respiratory failure.  He had a cough that persisted for 6 weeks following hospital stay but this has since improved and he is back to baseline functionally mMRC class II however he has not been able to come off supplemental oxygen currently using 2 L continuous or 3 L pulsed.  He has cut back on tobacco use and is currently smoking 5 cigarettes/day.  Updated pulmonary function testing from today shows stable FEV1 at 77% predicted, normal lung volumes and reduced DLCO at 65% predicted.  Not significantly changed from prior.    Past Medical History:   Diagnosis Date    Adrenal disorder (HCC)     Arrhythmia     Breath shortness     Cancer (HCC)     melanoma in 2007, original 1989 from back of neck (chemo)     Cataract     Emphysema of lung (HCC)     Heart burn     Hypertension     Indigestion     Metastatic melanoma (HCC) 1990 / 2012    original neck lesion resected 1990 / recurrent lesion resected 2007 / third lesion resected 2012 /recurrence in 2016    Thyroid disease        Social History     Socioeconomic History    Marital status:      Spouse name: Not on file    Number of children: Not on file    Years of education: Not on file    Highest education level: Not on file   Occupational History    Not on file   Tobacco Use    Smoking status: Every Day     Packs/day: 1.00     Years: 40.00     Pack years: 40.00     Types: Cigarettes     Passive exposure: Past    Smokeless tobacco: Never    Tobacco comments:     did vape, but has not quit 10/2019, 1/2-1 PPD   Vaping Use    Vaping Use: Former    Substances: Nicotine    Passive vaping exposure: Yes   Substance and Sexual Activity    Alcohol use: Yes     Comment: 1 drink per month     Drug use: No    Sexual activity: Not on file   Other Topics Concern    Not on file   Social History Narrative    Not on file     Social Determinants of Health     Financial Resource Strain: Not on file   Food Insecurity: Not on file   Transportation Needs: Not on file   Physical Activity: Not on file   Stress: Not on file   Social Connections: Not on file   Intimate Partner Violence: Not on file   Housing Stability: Not on file       Family History   Problem Relation Age of Onset    Heart Disease Mother     Cancer Father     Heart Disease Maternal Grandmother     Heart Disease Maternal Grandfather     Cancer Paternal Grandfather        Current Outpatient Medications on File Prior to Visit   Medication Sig Dispense Refill    hydrocortisone (CORTEF) 10 MG Tab Take 1 Tablet by mouth 4 times a day. 336 Tablet 2    levothyroxine (SYNTHROID) 150 MCG Tab Take 1 Tablet by mouth every day. 90 Tablet 3    testosterone cypionate (DEPO-TESTOSTERONE) 200 MG/ML Solution injection Inject 0.4 mL into the  "shoulder, thigh, or buttocks every 10 days for 90 days. 4 mL 2    nicotine (NICODERM) 14 MG/24HR PATCH 24 HR apply 1 patch TO CLEAN, DRY, AND INTACT SKIN REMOVE AND REPLACE once daily      rivaroxaban (XARELTO) 20 MG Tab tablet Take 1 Tablet by mouth with dinner. 90 Tablet 3    felodipine ER (PLENDIL) 2.5 MG TABLET SR 24 HR Take 1 Tablet by mouth every day. 90 Tablet 3    DILTIAZem CD (CARDIZEM CD) 120 MG CAPSULE SR 24 HR Take 1 Capsule by mouth every day. 90 Capsule 3    flecainide (TAMBOCOR) 100 MG Tab Take 1 Tablet by mouth 2 times a day. 180 Tablet 3    acetaminophen (TYLENOL) 325 MG Tab acetaminophen 325 mg tablet   take 2 tablets by mouth three times a day if needed for pain for 5 days      tamsulosin (FLOMAX) 0.4 MG capsule tamsulosin 0.4 mg capsule   Take 2 capsules every day by oral route at bedtime.   hold if having dizziness when standing up      simvastatin (ZOCOR) 20 MG TABS Take 20 mg by mouth every morning.      omeprazole (PRILOSEC) 20 MG CPDR Take 20 mg by mouth every day.      SYRINGE-NEEDLE, DISP, 3 ML (B-D 3CC LUER-BO SYR 23GX1\") 23G X 1\" 3 ML Misc use as directed 6 Each 2    Fluticasone-Umeclidin-Vilant (TRELEGY ELLIPTA) 100-62.5-25 MCG/INH AEROSOL POWDER, BREATH ACTIVATED inhalation Inhale 1 Inhalation every day. (Patient not taking: Reported on 12/12/2022) 3 Each 3     No current facility-administered medications on file prior to visit.       Sulfa drugs      ROS:   Review of Systems   Constitutional:  Negative for chills, diaphoresis, fever, malaise/fatigue and weight loss.   HENT:  Negative for congestion, ear discharge, ear pain, hearing loss, nosebleeds, sinus pain, sore throat and tinnitus.    Eyes:  Negative for blurred vision, double vision, photophobia, pain, discharge and redness.   Respiratory:  Negative for cough, hemoptysis, sputum production, shortness of breath, wheezing and stridor.    Cardiovascular:  Negative for chest pain, palpitations, orthopnea, claudication, leg swelling " "and PND.   Gastrointestinal:  Negative for abdominal pain, constipation, diarrhea, heartburn, nausea and vomiting.   Genitourinary:  Negative for dysuria and urgency.   Musculoskeletal:  Negative for back pain, falls, joint pain, myalgias and neck pain.   Skin:  Negative for itching and rash.   Neurological:  Negative for dizziness, tremors, speech change, focal weakness, weakness and headaches.   Endo/Heme/Allergies:  Negative for environmental allergies.   Psychiatric/Behavioral:  Negative for depression.      /68 (BP Location: Right arm, Patient Position: Sitting, BP Cuff Size: Large adult)   Pulse 77   Resp 16   Ht 1.778 m (5' 10\")   Wt 108 kg (239 lb)   SpO2 92%   Physical Exam  Vitals reviewed.   Constitutional:       General: He is not in acute distress.     Appearance: Normal appearance. He is normal weight.   HENT:      Head: Normocephalic and atraumatic.      Right Ear: External ear normal.      Left Ear: External ear normal.      Nose: Nose normal. No congestion.      Mouth/Throat:      Mouth: Mucous membranes are moist.      Pharynx: Oropharynx is clear. No oropharyngeal exudate.   Eyes:      General: No scleral icterus.     Extraocular Movements: Extraocular movements intact.      Conjunctiva/sclera: Conjunctivae normal.      Pupils: Pupils are equal, round, and reactive to light.   Cardiovascular:      Rate and Rhythm: Normal rate and regular rhythm.      Pulses: Normal pulses.      Heart sounds: Normal heart sounds. No murmur heard.    No gallop.   Pulmonary:      Effort: Pulmonary effort is normal. No respiratory distress.      Breath sounds: Normal breath sounds. No wheezing or rales.   Abdominal:      General: There is no distension.      Palpations: Abdomen is soft.   Musculoskeletal:         General: Normal range of motion.      Cervical back: Normal range of motion and neck supple.      Right lower leg: No edema.      Left lower leg: No edema.   Skin:     General: Skin is warm and " dry.      Findings: No rash.   Neurological:      Mental Status: He is alert and oriented to person, place, and time.      Cranial Nerves: No cranial nerve deficit.   Psychiatric:         Mood and Affect: Mood normal.         Behavior: Behavior normal.       PFTs as reviewed by me personally: as per hPI    Imaging as reviewed by me personally:  as per hPI    Assessment:  1. Chronic obstructive pulmonary disease, unspecified COPD type (HCC)  Multiple Oximetry    PULMONARY FUNCTION TESTS -Test requested: Complete Pulmonary Function Test    PROAIR  (90 Base) MCG/ACT Aero Soln inhalation aerosol    Multiple Oximetry      2. Chronic respiratory failure with hypoxia (HCC)  EC-ECHOCARDIOGRAM COMPLETE W/O CONT    Multiple Oximetry    PULMONARY FUNCTION TESTS -Test requested: Complete Pulmonary Function Test    Multiple Oximetry      3. Tobacco use        4. Metastatic melanoma (HCC)        5. Need for vaccination  Pneumococcal Conjugate Vaccine 20-Valent (19 yrs+)          Plan:  Chronic and has been mild until recently now complicated by respiratory failure.  No real change in his PFTs despite need for supplemental oxygen although viral illness in June may have decreased his ability to compensate.  I am recommending repeat PFTs in 6 months and obtaining echocardiogram with bubble now just to ensure we are not missing any other complicating diagnoses.  Continue Trelegy 100.  Counseled on tobacco cessation.  Continue supplemental oxygen.  Chronic but was only needing oxygen at night prior to June.  He did desaturate in clinic today and needed up to 3 L of supplemental oxygen to maintain saturations of 89% or greater.  See discussion above.  He is both compliant with and benefiting from supplemental oxygen.  Counseled on complete tobacco cessation.  No evidence for recurrence and is followed by oncology.  Patient given pneumococcal vaccination today.  Return in about 6 months (around 6/12/2023) for pft same day as  follow up.

## 2022-12-19 DIAGNOSIS — J44.9 CHRONIC OBSTRUCTIVE PULMONARY DISEASE, UNSPECIFIED COPD TYPE (HCC): ICD-10-CM

## 2022-12-19 RX ORDER — FLUTICASONE FUROATE, UMECLIDINIUM BROMIDE AND VILANTEROL TRIFENATATE 100; 62.5; 25 UG/1; UG/1; UG/1
POWDER RESPIRATORY (INHALATION)
Qty: 3 EACH | Refills: 3 | Status: SHIPPED | OUTPATIENT
Start: 2022-12-19 | End: 2023-09-28 | Stop reason: SDUPTHER

## 2022-12-20 NOTE — TELEPHONE ENCOUNTER
"Caller Name: Artem Joseph                 Call Back Number: [phone] (home)         Patient approves a detailed voicemail message: N\A    Have we ever prescribed this med? Yes.  If yes, what date? 12/12/22     Last OV: 12/12/22 Dr. Baugh     Next OV: 6/12/23 Dr. Baugh     DX: COPD     Medications:  Current Outpatient Medications   Medication Sig Dispense Refill    PROAIR  (90 Base) MCG/ACT Aero Soln inhalation aerosol Inhale 1-2 Puffs every four hours as needed for Shortness of Breath. 18 g 3    fluticasone-umeclidin-vilant (TRELEGY ELLIPTA) 100-62.5-25 MCG/ACT AEROSOL POWDER, BREATH ACTIVATED inhalation Inhale 1 Inhalation every day. 33 Each 3    hydrocortisone (CORTEF) 10 MG Tab Take 1 Tablet by mouth 4 times a day. 336 Tablet 2    levothyroxine (SYNTHROID) 150 MCG Tab Take 1 Tablet by mouth every day. 90 Tablet 3    testosterone cypionate (DEPO-TESTOSTERONE) 200 MG/ML Solution injection Inject 0.4 mL into the shoulder, thigh, or buttocks every 10 days for 90 days. 4 mL 2    nicotine (NICODERM) 14 MG/24HR PATCH 24 HR apply 1 patch TO CLEAN, DRY, AND INTACT SKIN REMOVE AND REPLACE once daily      rivaroxaban (XARELTO) 20 MG Tab tablet Take 1 Tablet by mouth with dinner. 90 Tablet 3    felodipine ER (PLENDIL) 2.5 MG TABLET SR 24 HR Take 1 Tablet by mouth every day. 90 Tablet 3    DILTIAZem CD (CARDIZEM CD) 120 MG CAPSULE SR 24 HR Take 1 Capsule by mouth every day. 90 Capsule 3    flecainide (TAMBOCOR) 100 MG Tab Take 1 Tablet by mouth 2 times a day. 180 Tablet 3    SYRINGE-NEEDLE, DISP, 3 ML (B-D 3CC LUER-BO SYR 23GX1\") 23G X 1\" 3 ML Misc use as directed 6 Each 2    Fluticasone-Umeclidin-Vilant (TRELEGY ELLIPTA) 100-62.5-25 MCG/INH AEROSOL POWDER, BREATH ACTIVATED inhalation Inhale 1 Inhalation every day. (Patient not taking: Reported on 12/12/2022) 3 Each 3    acetaminophen (TYLENOL) 325 MG Tab acetaminophen 325 mg tablet   take 2 tablets by mouth three times a day if needed for pain for 5 days   "    tamsulosin (FLOMAX) 0.4 MG capsule tamsulosin 0.4 mg capsule   Take 2 capsules every day by oral route at bedtime.   hold if having dizziness when standing up      simvastatin (ZOCOR) 20 MG TABS Take 20 mg by mouth every morning.      omeprazole (PRILOSEC) 20 MG CPDR Take 20 mg by mouth every day.       No current facility-administered medications for this visit.

## 2022-12-21 DIAGNOSIS — J44.9 CHRONIC OBSTRUCTIVE PULMONARY DISEASE, UNSPECIFIED COPD TYPE (HCC): ICD-10-CM

## 2022-12-22 NOTE — TELEPHONE ENCOUNTER
proair backordered. generic available rx is toya please advise  Brand to Generic  Information: Additional agency message: [employer] (BIN#[id number] PCN:[id number]) - #D#  Formulary Status Change.

## 2022-12-23 RX ORDER — ALBUTEROL SULFATE 90 UG/1
AEROSOL, METERED RESPIRATORY (INHALATION)
Qty: 8.5 G | Refills: 3 | Status: SHIPPED | OUTPATIENT
Start: 2022-12-23 | End: 2023-04-18

## 2023-04-18 ENCOUNTER — OFFICE VISIT (OUTPATIENT)
Dept: ENDOCRINOLOGY | Facility: MEDICAL CENTER | Age: 73
End: 2023-04-18
Payer: MEDICARE

## 2023-04-18 VITALS
WEIGHT: 219.9 LBS | OXYGEN SATURATION: 93 % | HEIGHT: 72 IN | HEART RATE: 105 BPM | SYSTOLIC BLOOD PRESSURE: 120 MMHG | BODY MASS INDEX: 29.78 KG/M2 | DIASTOLIC BLOOD PRESSURE: 72 MMHG

## 2023-04-18 DIAGNOSIS — S32.000S COMPRESSION FRACTURE OF LUMBAR VERTEBRA, UNSPECIFIED LUMBAR VERTEBRAL LEVEL, SEQUELA: ICD-10-CM

## 2023-04-18 DIAGNOSIS — E03.9 HYPOTHYROIDISM, ACQUIRED: ICD-10-CM

## 2023-04-18 DIAGNOSIS — E55.9 VITAMIN D DEFICIENCY: ICD-10-CM

## 2023-04-18 DIAGNOSIS — E27.1 ADRENAL INSUFFICIENCY (ADDISON'S DISEASE) (HCC): ICD-10-CM

## 2023-04-18 DIAGNOSIS — M85.80 STEROID-INDUCED OSTEOPENIA: ICD-10-CM

## 2023-04-18 DIAGNOSIS — E29.1 PRIMARY TESTICULAR HYPOGONADISM: ICD-10-CM

## 2023-04-18 DIAGNOSIS — D75.1 POLYCYTHEMIA, SECONDARY: ICD-10-CM

## 2023-04-18 DIAGNOSIS — T38.0X5A STEROID-INDUCED OSTEOPENIA: ICD-10-CM

## 2023-04-18 LAB
ACTH PLAS-MCNC: <1.5 PG/ML (ref 7.2–63.3)
ALBUMIN SERPL-MCNC: 4.3 G/DL (ref 3.7–4.7)
ALBUMIN/GLOB SERPL: 2 {RATIO} (ref 1.2–2.2)
ALP SERPL-CCNC: 201 IU/L (ref 44–121)
ALT SERPL-CCNC: 41 IU/L (ref 0–44)
AMBIG ABBREV CMP14 DFLT   977206: NORMAL
AST SERPL-CCNC: 32 IU/L (ref 0–40)
BILIRUB SERPL-MCNC: 0.5 MG/DL (ref 0–1.2)
BUN SERPL-MCNC: 12 MG/DL (ref 8–27)
BUN/CREAT SERPL: 12 (ref 10–24)
CALCIUM SERPL-MCNC: 9.7 MG/DL (ref 8.6–10.2)
CHLORIDE SERPL-SCNC: 102 MMOL/L (ref 96–106)
CO2 SERPL-SCNC: 26 MMOL/L (ref 20–29)
CREAT SERPL-MCNC: 1.01 MG/DL (ref 0.76–1.27)
EGFRCR SERPLBLD CKD-EPI 2021: 80 ML/MIN/1.73
GLOBULIN SER CALC-MCNC: 2.1 G/DL (ref 1.5–4.5)
GLUCOSE SERPL-MCNC: 291 MG/DL (ref 70–99)
HCT VFR BLD AUTO: 49.4 % (ref 37.5–51)
HGB BLD-MCNC: 16.4 G/DL (ref 13–17.7)
POTASSIUM SERPL-SCNC: 4.2 MMOL/L (ref 3.5–5.2)
PROT SERPL-MCNC: 6.4 G/DL (ref 6–8.5)
SHBG SERPL-SCNC: 32.9 NMOL/L (ref 19.3–76.4)
SODIUM SERPL-SCNC: 140 MMOL/L (ref 134–144)
T4 FREE SERPL-MCNC: 2.02 NG/DL (ref 0.82–1.77)
TESTOST FREE SERPL-MCNC: 2.3 PG/ML (ref 6.6–18.1)
TESTOST SERPL-MCNC: 248.1 NG/DL (ref 264–916)
TSH SERPL DL<=0.005 MIU/L-ACNC: 1.63 UIU/ML (ref 0.45–4.5)

## 2023-04-18 PROCEDURE — 99214 OFFICE O/P EST MOD 30 MIN: CPT

## 2023-04-18 PROCEDURE — 99211 OFF/OP EST MAY X REQ PHY/QHP: CPT

## 2023-04-18 RX ORDER — LEVOTHYROXINE SODIUM 0.15 MG/1
150 TABLET ORAL DAILY
Qty: 90 TABLET | Refills: 4 | Status: SHIPPED | OUTPATIENT
Start: 2023-04-18 | End: 2023-08-15 | Stop reason: SDUPTHER

## 2023-04-18 RX ORDER — TRAMADOL HYDROCHLORIDE 50 MG/1
TABLET ORAL
COMMUNITY
Start: 2023-03-24 | End: 2023-11-21

## 2023-04-18 RX ORDER — SYRINGE WITH NEEDLE, 1 ML 25GX5/8"
SYRINGE, EMPTY DISPOSABLE MISCELLANEOUS
Qty: 6 EACH | Refills: 2 | Status: SHIPPED | OUTPATIENT
Start: 2023-04-18 | End: 2023-07-10

## 2023-04-18 RX ORDER — TESTOSTERONE CYPIONATE 200 MG/ML
120 INJECTION, SOLUTION INTRAMUSCULAR
Qty: 6 ML | Refills: 0 | Status: SHIPPED | OUTPATIENT
Start: 2023-04-18 | End: 2023-07-17

## 2023-04-18 RX ORDER — HYDROCORTISONE 10 MG/1
10 TABLET ORAL 4 TIMES DAILY
Qty: 336 TABLET | Refills: 4 | Status: SHIPPED | OUTPATIENT
Start: 2023-04-18

## 2023-04-18 ASSESSMENT — FIBROSIS 4 INDEX: FIB4 SCORE: 3.14

## 2023-04-18 NOTE — PROGRESS NOTES
HPI: Maykel is a 72 year old male here to establish care for the following issues       1. Primary testicular hypogonadism  Well documented with low testosterone and elevated LH levels  current testosterone replacement of testosterone cypionate 80 mg IM every 10 days.  Testosterone is helpful with normal libido, and energy levels  Reports fatigue, muscle, brain fogginess    Blood work done at [facility] on 10/7/2022 showed  Total testosterone at 263 (250-1100)                   Latest Reference Range & Units 04/25/22 07:36   Prostatic Specific Antigen Tot 0.0 - 4.0 ng/mL 3.3   FV by [facility] for Flomax     2. Polycythemia, secondary  Normal testosterone levels  History of COPD with nocturnal oxygen-2L   Sleep apnea has been ruled out          3. Hypothyroidism, unspecified type  Clinically euthyroid taking levothyroxine 150 mcg/day.    Taking his thyroid hormone replacement prior to breakfast, on an empty stomach  Denies taking iron, calcium, antiacids   Denies taking biotin or multivatims     Denies palpitation, tremors, fatigue          4. Adrenal insufficiency (HCC)  Diagnosed condition by Dr. Manrique, possibly related to previous metastatic melanoma for which he took, steroids for more than 6 months  Currently taking hydrocortisone 20 mg in the morning and 10 mg in the afternoon  He takes an extra 10 mg dose for the stress  Denies cushingoid symptoms                     5. Compression fracture of lumbar vertebra, unspecified lumbar vertebral level, sequela  This is healed  Occurred over year ago when he was lifting a heavy log-healed    His back suddenly developed excruciating pain.    He was identified as having a compression fracture of one of his vertebrae which I did not know about he only had a recent CAT scan demonstrating this.      6.  Steroid-induced osteopenia:  The presumptive diagnosis based on  #5 above.    Bone density scan was ordered by Dr. Manrique, patient did his bone density scan at  "[facility] do not have results-    Bone density scan on 10/21/2022 showed  A lumbar T score of -0.8, a right femur T score of -2.2    7.  Vitamin D deficiency:  Currently taking 5000 IUs     Blood work done at [facility] on 10/7/2022 showed  Vitamin D, 25 total 36.4 (25-80)    ROS:  Feeling very well with no additional complaints.      Allergies:   Allergies   Allergen Reactions    Sulfa Drugs      Severe muscle pains       Current medicines including changes today:  Current Outpatient Medications   Medication Sig Dispense Refill    albuterol 108 (90 Base) MCG/ACT Aero Soln inhalation aerosol inhale 1 to 2 puffs by mouth and INTO THE LUNGS every 4 hours if needed for shortness of breath 8.5 g 3    TRELEGY ELLIPTA 100-62.5-25 MCG/ACT AEROSOL POWDER, BREATH ACTIVATED inhalation inhale 1 puff by mouth and INTO THE LUNGS once daily 3 Each 3    fluticasone-umeclidin-vilant (TRELEGY ELLIPTA) 100-62.5-25 MCG/ACT AEROSOL POWDER, BREATH ACTIVATED inhalation Inhale 1 Inhalation every day. 33 Each 3    hydrocortisone (CORTEF) 10 MG Tab Take 1 Tablet by mouth 4 times a day. 336 Tablet 2    levothyroxine (SYNTHROID) 150 MCG Tab Take 1 Tablet by mouth every day. 90 Tablet 3    nicotine (NICODERM) 14 MG/24HR PATCH 24 HR apply 1 patch TO CLEAN, DRY, AND INTACT SKIN REMOVE AND REPLACE once daily      rivaroxaban (XARELTO) 20 MG Tab tablet Take 1 Tablet by mouth with dinner. 90 Tablet 3    felodipine ER (PLENDIL) 2.5 MG TABLET SR 24 HR Take 1 Tablet by mouth every day. 90 Tablet 3    DILTIAZem CD (CARDIZEM CD) 120 MG CAPSULE SR 24 HR Take 1 Capsule by mouth every day. 90 Capsule 3    flecainide (TAMBOCOR) 100 MG Tab Take 1 Tablet by mouth 2 times a day. 180 Tablet 3    SYRINGE-NEEDLE, DISP, 3 ML (B-D 3CC LUER-BO SYR 23GX1\") 23G X 1\" 3 ML Misc use as directed 6 Each 2    acetaminophen (TYLENOL) 325 MG Tab acetaminophen 325 mg tablet   take 2 tablets by mouth three times a day if needed for pain for 5 days      tamsulosin (FLOMAX) " 0.4 MG capsule tamsulosin 0.4 mg capsule   Take 2 capsules every day by oral route at bedtime.   hold if having dizziness when standing up      simvastatin (ZOCOR) 20 MG TABS Take 20 mg by mouth every morning.      omeprazole (PRILOSEC) 20 MG CPDR Take 20 mg by mouth every day.       No current facility-administered medications for this visit.        Past Medical History:   Diagnosis Date    Adrenal disorder (HCC)     Arrhythmia     Breath shortness     Cancer (HCC)     melanoma in 2007, original 1989 from back of neck (chemo)    Cataract     Emphysema of lung (HCC)     Heart burn     Hypertension     Indigestion     Metastatic melanoma (HCC) 1990 / 2012    original neck lesion resected 1990 / recurrent lesion resected 2007 / third lesion resected 2012 /recurrence in 2016    Thyroid disease        PHYSICAL EXAM:    /72 (BP Location: Left arm, Patient Position: Sitting)   Pulse (!) 105   Ht 1.829 m (6')   Wt 99.7 kg (219 lb 14.4 oz)   SpO2 93%   BMI 29.82 kg/m²     Gen. overweight but otherwise appears healthy             No cushingoid features    Skin   appropriate for sex and age             No ecchymoses, purpura, thin skin or abdominal striae consistent with excessive cortisol effect    HEENT  unremarkable    Neck   no adenopathy, I cannot feel his thyroid.    Breasts    normal male fatty tissue.  No gynecomastia    Heart  regular    Extremities  no edema    Neuro  gait and station normal    Psych  appropriate      ASSESSMENT AND RECOMMENDATIONS  1. Primary testicular hypogonadism  Clinically unstable  Biochemically unstable  We will increase his testosterone to 120 mg (0.6 mL) every 10 days  Discussed benefits and risks of testosterone replacement and importance of keeping it within range  Discussed importance and yearly PSA check-he currently gets his PSAs evaluated by [facility]      - Testosterone, Free & Total, Adult Male (w/SHBG); Future  - HEMOGLOBIN AND HEMATOCRIT; Future  - Comp Metabolic  "Panel; Future  - PSA TOTAL W/FREE PSA REFLEX; Future  - PROSTATE SPECIFIC AG DIAGNOSTIC; Future  - testosterone cypionate (DEPO-TESTOSTERONE) 200 MG/ML Solution injection; Inject 0.6 mL into the shoulder, thigh, or buttocks every 10 days for 90 days.  Dispense: 6 mL; Refill: 0  - SYRINGE-NEEDLE, DISP, 3 ML (B-D 3CC LUER-BO SYR 23GX1\") 23G X 1\" 3 ML Misc; use as directed  Dispense: 6 Each; Refill: 2  - SYRINGE-NEEDLE, DISP, 3 ML (BD LUER-LOCK SYRINGE) 18G X 1-1/2\" 3 ML Misc; 1 Each every 14 days. Use to draw up testosterone  Dispense: 30 Each; Refill: 11    2. Polycythemia, secondary  Stable    3. Hypothyroidism, acquired  Clinically unstable  Biochemically unstable  At this time I would like to continue the same dose until we will evaluate treatment for diagnoses #1 in 3 months  - TSH; Future  - FREE THYROXINE; Future  - levothyroxine (SYNTHROID) 150 MCG Tab; Take 1 Tablet by mouth every day.  Dispense: 90 Tablet; Refill: 4    4. Adrenal insufficiency (Lunenburg's disease) (HCC)  Stable  Continue regimen-HPI  Patient understands about her stress dosing  - hydrocortisone (CORTEF) 10 MG Tab; Take 1 Tablet by mouth 4 times a day.  Dispense: 336 Tablet; Refill: 4    5. Compression fracture of lumbar vertebra, unspecified lumbar vertebral level, sequela  Resolved    6. Steroid-induced osteopenia  Unstable  We discussed calcium, green leafy vegetables, and vitamin D supplementation  We discussed the addition of a biphosphonate-at this time patient would like not to take any medication    7. Vitamin D deficiency  Unstable  Continue regimen-HPI  - VITAMIN D,25 HYDROXY (DEFICIENCY); Future      Disposition: Make an appointment to follow-up in 3 months  Do your blood work 2 weeks before your next appointment      DARLING Herman.  10/18/2022  "

## 2023-06-05 ENCOUNTER — TELEPHONE (OUTPATIENT)
Dept: CARDIOLOGY | Facility: MEDICAL CENTER | Age: 73
End: 2023-06-05
Payer: MEDICARE

## 2023-06-05 NOTE — LETTER
PROCEDURE/SURGERY CLEARANCE FORM    Date: 6/6/2023   Patient Name: Artem Joseph    Dear Surgeon or Proceduralist,      Thank you for your request for cardiac stratification of our mutual patient Artem Joseph 1950. We have reviewed their [facility] records; and to the best of our understanding this patient has not had stenting, ablation, cardiothoracic surgery or hospitalization for cardiovascular reasons in the past 6 months.  Artem Joseph has been seen within the past 18 months and is considered to have non-modifiable cardiac risk for this low-risk procedure/surgery. They may proceed from a cardiovascular standpoint and may hold their antiplatelet/anticoagulation as briefly as possible. Please have patient resume this medication when hemodynamically stable to do so.    Pradaxa/Xarelto/Eliquis/Savesya - hold 1 day prior to procedure for low bleeding risk procedure, 2 days for high bleeding risk procedure, or consider holding 3 days or longer for patients with reduced kidney function (CrCl <30mL/min) or spinal/cranial surgeries/procedures.      If they have a mechanical heart valve, please coordinate with [facility] ([phone]) the proper management of their anticoagulant in the periprocedural or perioperative period.      Some patients have higher risk for cardiovascular complications or holding medication. If our patient has had prior complications of holding antiplatelet or anticoagulants in the past and we have seen them after these events, we have addressed these concerns with the patient. They are at an unknown degree of increased risk for recurrent complication.  You may hold anticoagulation/antiplatelets for the procedure or surgery if the benefits of the procedure or surgery outweigh this nonmodifiable risk.      If Artem Joseph 1950 has new symptoms of heart failure decompensation, unstable arrythmia, or angina please reach out and we will assess the patient.       If you have other patient-specific concerns, please feel free to reach out to the patient's cardiologist directly at [phone].     Thank you,       [facility]

## 2023-06-05 NOTE — TELEPHONE ENCOUNTER
Last OV: 07/08/2022  Proposed Surgery: Aquablation  Surgery Date: 06/08/2023  Requesting Office Name: Jonathan Ogden  Fax Number: [phone]  Preference of Location (default is surgery center unless specified by Cardiologist or [facility])  Prior Clearance Addressed: No      Anticoags/Antiplatelets: Xarelto  Outstanding Cardiac Imaging : Yes  Echo.   Clearance to provider to review  Stent, Cardiac Devices, or Catheterization: No  Ablation, TAVR, Cardioversion: No  Recent Cardiac Hospitalization: No            When: N/A  History (cardiac history):   Past Medical History:   Diagnosis Date    Adrenal disorder (HCC)     Arrhythmia     Breath shortness     Cancer (HCC)     melanoma in 2007, original 1989 from back of neck (chemo)    Cataract     Emphysema of lung (HCC)     Heart burn     Hypertension     Indigestion     Metastatic melanoma (HCC) 1990 / 2012    original neck lesion resected 1990 / recurrent lesion resected 2007 / third lesion resected 2012 /recurrence in 2016    Thyroid disease              Surgical Clearance Letter Sent: NO. Provider to advise.   **Scan clearance request letter into [employer].**

## 2023-06-12 ENCOUNTER — APPOINTMENT (OUTPATIENT)
Dept: SLEEP MEDICINE | Facility: MEDICAL CENTER | Age: 73
End: 2023-06-12
Attending: INTERNAL MEDICINE
Payer: MEDICARE

## 2023-07-10 ENCOUNTER — OFFICE VISIT (OUTPATIENT)
Dept: CARDIOLOGY | Facility: MEDICAL CENTER | Age: 73
End: 2023-07-10
Attending: NURSE PRACTITIONER
Payer: MEDICARE

## 2023-07-10 VITALS
SYSTOLIC BLOOD PRESSURE: 112 MMHG | HEIGHT: 72 IN | DIASTOLIC BLOOD PRESSURE: 60 MMHG | BODY MASS INDEX: 28.17 KG/M2 | RESPIRATION RATE: 16 BRPM | OXYGEN SATURATION: 95 % | HEART RATE: 86 BPM | WEIGHT: 208 LBS

## 2023-07-10 DIAGNOSIS — E78.5 DYSLIPIDEMIA: ICD-10-CM

## 2023-07-10 DIAGNOSIS — I10 ESSENTIAL HYPERTENSION: ICD-10-CM

## 2023-07-10 DIAGNOSIS — I48.91 ATRIAL FIBRILLATION WITH RAPID VENTRICULAR RESPONSE (HCC): ICD-10-CM

## 2023-07-10 DIAGNOSIS — I48.0 PAROXYSMAL ATRIAL FIBRILLATION (HCC): ICD-10-CM

## 2023-07-10 DIAGNOSIS — Z72.0 TOBACCO ABUSE: ICD-10-CM

## 2023-07-10 DIAGNOSIS — Z79.899 HIGH RISK MEDICATION USE: ICD-10-CM

## 2023-07-10 PROBLEM — N40.1 BENIGN PROSTATIC HYPERPLASIA WITH URINARY OBSTRUCTION: Status: ACTIVE | Noted: 2023-06-08

## 2023-07-10 PROBLEM — N13.8 BENIGN PROSTATIC HYPERPLASIA WITH URINARY OBSTRUCTION: Status: ACTIVE | Noted: 2023-06-08

## 2023-07-10 PROBLEM — R33.9 RETENTION OF URINE: Status: ACTIVE | Noted: 2023-05-21

## 2023-07-10 PROBLEM — N40.1 LOWER URINARY TRACT SYMPTOMS DUE TO BENIGN PROSTATIC HYPERPLASIA: Status: ACTIVE | Noted: 2023-05-21

## 2023-07-10 PROCEDURE — 3078F DIAST BP <80 MM HG: CPT | Performed by: NURSE PRACTITIONER

## 2023-07-10 PROCEDURE — 3074F SYST BP LT 130 MM HG: CPT | Performed by: NURSE PRACTITIONER

## 2023-07-10 PROCEDURE — 99214 OFFICE O/P EST MOD 30 MIN: CPT | Performed by: NURSE PRACTITIONER

## 2023-07-10 PROCEDURE — 99212 OFFICE O/P EST SF 10 MIN: CPT | Performed by: NURSE PRACTITIONER

## 2023-07-10 PROCEDURE — 99213 OFFICE O/P EST LOW 20 MIN: CPT | Performed by: NURSE PRACTITIONER

## 2023-07-10 RX ORDER — FLECAINIDE ACETATE 100 MG/1
100 TABLET ORAL 2 TIMES DAILY
Qty: 180 TABLET | Refills: 3 | Status: SHIPPED | OUTPATIENT
Start: 2023-07-10

## 2023-07-10 RX ORDER — FELODIPINE 2.5 MG/1
2.5 TABLET, EXTENDED RELEASE ORAL DAILY
Qty: 90 TABLET | Refills: 3 | Status: SHIPPED | OUTPATIENT
Start: 2023-07-10

## 2023-07-10 RX ORDER — DILTIAZEM HYDROCHLORIDE 120 MG/1
120 CAPSULE, COATED, EXTENDED RELEASE ORAL DAILY
Qty: 90 CAPSULE | Refills: 3 | Status: SHIPPED | OUTPATIENT
Start: 2023-07-10

## 2023-07-10 RX ORDER — SIMVASTATIN 20 MG
20 TABLET ORAL EVERY MORNING
Qty: 90 TABLET | Refills: 3 | Status: SHIPPED | OUTPATIENT
Start: 2023-07-10

## 2023-07-10 ASSESSMENT — ENCOUNTER SYMPTOMS
FEVER: 0
ORTHOPNEA: 0
MYALGIAS: 0
DIZZINESS: 0
CLAUDICATION: 0
PALPITATIONS: 1
ABDOMINAL PAIN: 0
SHORTNESS OF BREATH: 0
PND: 0
COUGH: 0

## 2023-07-10 ASSESSMENT — FIBROSIS 4 INDEX: FIB4 SCORE: 3.18

## 2023-07-10 NOTE — PROGRESS NOTES
Chief Complaint   Patient presents with    Atrial Fibrillation     F/V Dx: PAF (paroxysmal atrial fibrillation) (HCC)    Hypertension       Subjective:   Maykel Joseph is a 73 y.o. male who presents today for follow-up on his A. Fib.    Previous patient of Dr. Ramirez.  He was last seen in clinic on 7/8/2022 with YOGI Jiang.  No changes were made during that visit.    Patient reports admission to the hospital last month for his prostate.  He did end up having an aqua ablation transurethral destruction of the prostate tissue by water jet.    Patient currently reports continuing to have urinary frequency and is being treated for an infection.    He reports he had to stop his Xarelto due to hematuria.  He is hoping to resume Xarelto after he completes his course of antibiotics.    Otherwise, patient denies chest pain, palpitations, orthopnea, PND, edema or dizziness/lightheadedness.  He does use chronic oxygen between 2 and 3 L.    He does continue to smoke a pack of cigarettes every 3 to 4 days.  He is not interested in quitting.    Additonally, patient has the following medical problems:    -Hx Malignant Melanoma, received immunotherapy    -Adrenal insufficiency: Followed by endocrinology    -Patient reports paroxysmal A. fib which started while he was receiving immunotherapy for his malignant melanoma.    -Polycythemia     -Followed by endocrinology for hypogonadism, hypothyroidism    Past Medical History:   Diagnosis Date    Adrenal disorder (HCC)     Arrhythmia     Breath shortness     Cancer (HCC)     melanoma in 2007, original 1989 from back of neck (chemo)    Cataract     Emphysema of lung (HCC)     Heart burn     Hypertension     Indigestion     Metastatic melanoma (HCC) 1990 / 2012    original neck lesion resected 1990 / recurrent lesion resected 2007 / third lesion resected 2012 /recurrence in 2016    Thyroid disease      Past Surgical History:   Procedure Laterality Date    CATARACT PHACO WITH  IOL Left 11/6/2018    Procedure: CATARACT PHACO WITH IOL;  Surgeon: Shravan España M.D.;  Location: SURGERY SAME DAY [facility];  Service: Ophthalmology    CATARACT PHACO WITH IOL Right 10/16/2018    Procedure: CATARACT PHACO WITH IOL;  Surgeon: Shravan España M.D.;  Location: SURGERY SAME DAY [facility];  Service: Ophthalmology    COLONOSCOPY - ENDO  10/9/2016    Procedure: COLONOSCOPY - ENDO;  Surgeon: William Mitchell M.D.;  Location: SURGERY [facility];  Service:     RECOVERY  7/26/2016    Procedure: CT-CT Guided abdominal mass-;  Surgeon: Recoveryonly Surgery;  Location: SURGERY PRE-POST PROC UNIT [facility];  Service:     MASS EXCISION GENERAL  3/9/2012    Performed by MARLEN KEEN at SURGERY SAME DAY [facility]    OTHER      lymph nodes back of neck     Family History   Problem Relation Age of Onset    Heart Disease Mother     Cancer Father     Heart Disease Maternal Grandmother     Heart Disease Maternal Grandfather     Cancer Paternal Grandfather      Social History     Socioeconomic History    Marital status:      Spouse name: Not on file    Number of children: Not on file    Years of education: Not on file    Highest education level: Not on file   Occupational History    Not on file   Tobacco Use    Smoking status: Every Day     Packs/day: 1.00     Years: 40.00     Pack years: 40.00     Types: Cigarettes     Passive exposure: Past    Smokeless tobacco: Never    Tobacco comments:     did vape, but has not quit 10/2019, 1/2-1 PPD   Vaping Use    Vaping Use: Former    Substances: Nicotine    Passive vaping exposure: Yes   Substance and Sexual Activity    Alcohol use: Yes     Comment: 1 drink per month     Drug use: No    Sexual activity: Not on file   Other Topics Concern    Not on file   Social History Narrative    Not on file     Social Determinants of Health     Financial Resource Strain: Not on file   Food Insecurity: Not on file   Transportation Needs: Not on file   Physical  "Activity: Not on file   Stress: Not on file   Social Connections: Not on file   Intimate Partner Violence: Not on file   Housing Stability: Not on file     Allergies   Allergen Reactions    Sulfa Drugs      Severe muscle pains     Outpatient Encounter Medications as of 7/10/2023   Medication Sig Dispense Refill    rivaroxaban (XARELTO) 20 MG Tab tablet Take 1 Tablet by mouth with dinner. 90 Tablet 3    DILTIAZem CD (CARDIZEM CD) 120 MG CAPSULE SR 24 HR Take 1 Capsule by mouth every day. 90 Capsule 3    flecainide (TAMBOCOR) 100 MG Tab Take 1 Tablet by mouth 2 times a day. 180 Tablet 3    felodipine ER (PLENDIL) 2.5 MG TABLET SR 24 HR Take 1 Tablet by mouth every day. 90 Tablet 3    simvastatin (ZOCOR) 20 MG Tab Take 1 Tablet by mouth every morning. 90 Tablet 3    traMADol (ULTRAM) 50 MG Tab 1 TABLET TWICE A DAY AS NEEDED QTY 60 FOR 30 DAY SUPPLY. DX: M47.816. FWD      levothyroxine (SYNTHROID) 150 MCG Tab Take 1 Tablet by mouth every day. 90 Tablet 4    hydrocortisone (CORTEF) 10 MG Tab Take 1 Tablet by mouth 4 times a day. 336 Tablet 4    testosterone cypionate (DEPO-TESTOSTERONE) 200 MG/ML Solution injection Inject 0.6 mL into the shoulder, thigh, or buttocks every 10 days for 90 days. 6 mL 0    SYRINGE-NEEDLE, DISP, 3 ML (BD LUER-LOCK SYRINGE) 18G X 1-1/2\" 3 ML Misc 1 Each every 14 days. Use to draw up testosterone 30 Each 11    TRELEGY ELLIPTA 100-62.5-25 MCG/ACT AEROSOL POWDER, BREATH ACTIVATED inhalation inhale 1 puff by mouth and INTO THE LUNGS once daily 3 Each 3    fluticasone-umeclidin-vilant (TRELEGY ELLIPTA) 100-62.5-25 MCG/ACT AEROSOL POWDER, BREATH ACTIVATED inhalation Inhale 1 Inhalation every day. 33 Each 3    nicotine (NICODERM) 14 MG/24HR PATCH 24 HR apply 1 patch TO CLEAN, DRY, AND INTACT SKIN REMOVE AND REPLACE once daily      acetaminophen (TYLENOL) 325 MG Tab acetaminophen 325 mg tablet   take 2 tablets by mouth three times a day if needed for pain for 5 days      omeprazole (PRILOSEC) 20 MG " "CPDR Take 20 mg by mouth every day.      [DISCONTINUED] SYRINGE-NEEDLE, DISP, 3 ML (B-D 3CC LUER-BO SYR 23GX1\") 23G X 1\" 3 ML Misc use as directed (Patient not taking: Reported on 7/10/2023) 6 Each 2    [DISCONTINUED] rivaroxaban (XARELTO) 20 MG Tab tablet Take 1 Tablet by mouth with dinner. (Patient not taking: Reported on 7/10/2023) 90 Tablet 3    [DISCONTINUED] felodipine ER (PLENDIL) 2.5 MG TABLET SR 24 HR Take 1 Tablet by mouth every day. 90 Tablet 3    [DISCONTINUED] DILTIAZem CD (CARDIZEM CD) 120 MG CAPSULE SR 24 HR Take 1 Capsule by mouth every day. 90 Capsule 3    [DISCONTINUED] flecainide (TAMBOCOR) 100 MG Tab Take 1 Tablet by mouth 2 times a day. 180 Tablet 3    [DISCONTINUED] tamsulosin (FLOMAX) 0.4 MG capsule tamsulosin 0.4 mg capsule   Take 2 capsules every day by oral route at bedtime.   hold if having dizziness when standing up (Patient not taking: Reported on 7/10/2023)      [DISCONTINUED] simvastatin (ZOCOR) 20 MG TABS Take 20 mg by mouth every morning.       No facility-administered encounter medications on file as of 7/10/2023.     Review of Systems   Constitutional:  Negative for fever and malaise/fatigue.   Respiratory:  Negative for cough and shortness of breath.    Cardiovascular:  Positive for palpitations. Negative for chest pain, orthopnea, claudication, leg swelling and PND.   Gastrointestinal:  Negative for abdominal pain.   Musculoskeletal:  Negative for myalgias.   Neurological:  Negative for dizziness.   All other systems reviewed and are negative.       Objective:   /60 (BP Location: Left arm, Patient Position: Sitting, BP Cuff Size: Adult)   Pulse 86   Resp 16   Ht 1.829 m (6')   Wt 94.3 kg (208 lb)   SpO2 95%   BMI 28.21 kg/m²     Physical Exam  Vitals reviewed.   Constitutional:       Appearance: He is well-developed.   HENT:      Head: Normocephalic and atraumatic.   Eyes:      Pupils: Pupils are equal, round, and reactive to light.   Neck:      Vascular: No JVD. "   Cardiovascular:      Rate and Rhythm: Normal rate and regular rhythm.      Heart sounds: Normal heart sounds.   Pulmonary:      Effort: Pulmonary effort is normal. No respiratory distress.      Breath sounds: Normal breath sounds. No wheezing or rales.   Abdominal:      General: Bowel sounds are normal.      Palpations: Abdomen is soft.   Musculoskeletal:      Cervical back: Normal range of motion and neck supple.   Skin:     General: Skin is warm and dry.   Neurological:      Mental Status: He is alert and oriented to person, place, and time.   Psychiatric:         Behavior: Behavior normal.       Lab Results   Component Value Date/Time    CHOLSTRLTOT 147 04/25/2022 07:24 AM    LDL 70 12/05/2016 07:15 AM    HDL 64 04/25/2022 07:24 AM    TRIGLYCERIDE 134 04/25/2022 07:24 AM       Lab Results   Component Value Date/Time    SODIUM 140 06/09/2023 04:48 AM    SODIUM 140 04/10/2023 10:00 AM    SODIUM 141 09/11/2017 04:30 PM    POTASSIUM 4.4 06/09/2023 04:48 AM    POTASSIUM 4.2 04/10/2023 10:00 AM    POTASSIUM 3.7 09/11/2017 04:30 PM    CHLORIDE 106 06/09/2023 04:48 AM    CHLORIDE 102 04/10/2023 10:00 AM    CHLORIDE 110 09/11/2017 04:30 PM    CO2 27.0 06/09/2023 04:48 AM    CO2 26 04/10/2023 10:00 AM    CO2 24 09/11/2017 04:30 PM    GLUCOSE 362 (H) 06/09/2023 04:48 AM    GLUCOSE 291 (H) 04/10/2023 10:00 AM    GLUCOSE 106 (H) 09/11/2017 04:30 PM    BUN 12.0 06/09/2023 04:48 AM    BUN 12 04/10/2023 10:00 AM    BUN 10 09/11/2017 04:30 PM    CREATININE 1.2 06/09/2023 04:48 AM    CREATININE 1.01 04/10/2023 10:00 AM    CREATININE 0.85 09/11/2017 04:30 PM    CREATININE 1.0 03/07/2008 02:20 PM    BUNCREATRAT 10.0 06/09/2023 04:48 AM    BUNCREATRAT 12 04/10/2023 10:00 AM     Lab Results   Component Value Date/Time    ALKPHOSPHAT 201 (H) 04/10/2023 10:00 AM    ALKPHOSPHAT 109 (H) 09/11/2017 04:30 PM    ASTSGOT 32 04/10/2023 10:00 AM    ASTSGOT 26 09/11/2017 04:30 PM    ALTSGPT 41 04/10/2023 10:00 AM    ALTSGPT 19 09/11/2017 04:30  PM    TBILIRUBIN 0.5 04/10/2023 10:00 AM    TBILIRUBIN 0.6 09/11/2017 04:30 PM      Transthoracic Echo Report 10/8/2016  Technically difficult and incomplete study.   2 ml's of Optison contrast was used to enhance definition of the   endocardial borders.   Grade I diastolic dysfunction. Left ventricular ejection fraction is   visually estimated to be 55%.   Hypokinetic mid to apical lateral wall.  Trace mitral regurgitation.  Right ventricular systolic pressure is estimated to be 30 mmHg. Right atrial pressure is estimated to be 3 mmHg.  Ascending aorta borderline dilated is 3.9 cm.   No prior study is available for comparison.       Myocardial Perfusion Report 10/24/2016   NUCLEAR IMAGING INTERPRETATION   Normal myocardial perfusion with no ischemia.   Normal left ventricular wall motion.  LV ejection fraction = 55%.   ECG INTERPRETATION   Negative stress ECG for ischemia.     Assessment:     1. Atrial fibrillation with rapid ventricular response (HCC)        2. High risk medication use  rivaroxaban (XARELTO) 20 MG Tab tablet    Lipid Profile      3. Essential hypertension  DILTIAZem CD (CARDIZEM CD) 120 MG CAPSULE SR 24 HR    felodipine ER (PLENDIL) 2.5 MG TABLET SR 24 HR    Lipid Profile      4. Paroxysmal atrial fibrillation (HCC)  rivaroxaban (XARELTO) 20 MG Tab tablet    flecainide (TAMBOCOR) 100 MG Tab      5. Dyslipidemia  simvastatin (ZOCOR) 20 MG Tab    Lipid Profile      6. Tobacco abuse            Medical Decision Making:  Today's Assessment / Status / Plan:   1.  Paroxysmal A. fib:  -Patient had EKG last month at [facility] which was sinus rhythm, QTc 500  -Restart Xarelto 20 mg daily when able, continue to monitor for bleeding  -Continue flecainide 100 mg twice a day  -[id number] VASc score is 2  -Avoiding caffeine helps with A-fib episodes    2.  Hypertension: Stable  -Continue diltiazem 120 mg daily  -Continue felodipine 2.5 mg daily    3.  Dyslipidemia:  -Last LDL last year, patient to add  lipid panel to his upcoming labs for endocrinology  -Continue simvastatin 20 mg daily  -Encourage smoking cessation, patient not ready to quit.    FU in clinic in 1 year to establish with general cardiology. Sooner if needed.    Patient verbalizes understanding and agrees with the plan of care.     PLEASE NOTE: This Note was created using voice recognition Software. I have made every reasonable attempt to correct obvious errors, but I expect that there are errors of grammar and possibly content that I did not discover before finalizing the note

## 2023-07-27 ENCOUNTER — OFFICE VISIT (OUTPATIENT)
Dept: ENDOCRINOLOGY | Facility: MEDICAL CENTER | Age: 73
End: 2023-07-27
Payer: MEDICARE

## 2023-07-27 VITALS
SYSTOLIC BLOOD PRESSURE: 100 MMHG | HEIGHT: 72 IN | OXYGEN SATURATION: 94 % | BODY MASS INDEX: 27.27 KG/M2 | DIASTOLIC BLOOD PRESSURE: 68 MMHG | HEART RATE: 94 BPM | WEIGHT: 201.3 LBS

## 2023-07-27 DIAGNOSIS — E29.1 PRIMARY TESTICULAR HYPOGONADISM: ICD-10-CM

## 2023-07-27 DIAGNOSIS — E55.9 VITAMIN D DEFICIENCY: ICD-10-CM

## 2023-07-27 DIAGNOSIS — E03.9 HYPOTHYROIDISM, ACQUIRED: ICD-10-CM

## 2023-07-27 DIAGNOSIS — E27.40 ADRENAL INSUFFICIENCY (HCC): ICD-10-CM

## 2023-07-27 DIAGNOSIS — M85.80 STEROID-INDUCED OSTEOPENIA: ICD-10-CM

## 2023-07-27 DIAGNOSIS — D75.1 POLYCYTHEMIA, SECONDARY: ICD-10-CM

## 2023-07-27 DIAGNOSIS — T38.0X5A STEROID-INDUCED OSTEOPENIA: ICD-10-CM

## 2023-07-27 PROCEDURE — 3074F SYST BP LT 130 MM HG: CPT

## 2023-07-27 PROCEDURE — 99215 OFFICE O/P EST HI 40 MIN: CPT

## 2023-07-27 PROCEDURE — 3078F DIAST BP <80 MM HG: CPT

## 2023-07-27 PROCEDURE — 99211 OFF/OP EST MAY X REQ PHY/QHP: CPT

## 2023-07-27 RX ORDER — TESTOSTERONE CYPIONATE 200 MG/ML
140 INJECTION, SOLUTION INTRAMUSCULAR
Qty: 6 ML | Refills: 0 | Status: SHIPPED | OUTPATIENT
Start: 2023-07-27 | End: 2023-08-15 | Stop reason: SDUPTHER

## 2023-07-27 RX ORDER — NEEDLES, FILTER 19GX1 1/2"
23 NEEDLE, DISPOSABLE MISCELLANEOUS
Qty: 30 EACH | Refills: 11 | Status: SHIPPED | OUTPATIENT
Start: 2023-07-27 | End: 2023-08-15 | Stop reason: SDUPTHER

## 2023-07-27 ASSESSMENT — FIBROSIS 4 INDEX: FIB4 SCORE: 3.18

## 2023-07-27 NOTE — PROGRESS NOTES
HPI: Maykel is a 72 year old male here to establish care for the following issues       1. Primary testicular hypogonadism  Well documented low testosterone and elevated LH levels  Testosterone is helpful with normal libido, and energy levels  Reports fatigue, muscle, brain fogginess, weight loss   Recent proctectomy due to enlarge prostate-neg for cancer     Current testosterone replacement-testosterone cypionate 120 mg IM every 10 days.  Takes injection in between injections                   FV by [facility] for Flomax       2. Polycythemia, secondary  Normal testosterone levels  History of COPD with nocturnal oxygen-2L   Sleep apnea has been ruled out          3. Hypothyroidism, unspecified type  Clinically euthyroid taking levothyroxine 150 mcg/day.    Taking his thyroid hormone replacement prior to breakfast, on an empty stomach  Denies taking iron, calcium, antiacids   Denies taking biotin or multivatims     Denies palpitation, tremors,         4. Adrenal insufficiency (HCC)  Diagnosed condition by Dr. Manrique, possibly related to previous metastatic melanoma for which he took, steroids for more than 6 months  Currently taking hydrocortisone 20 mg in the morning and 20 mg in the afternoon  He takes an extra 10 mg dose for the stress  Denies cushingoid symptoms   Reports fatigue, muscle, brain fogginess, weight loss          Latest Reference Range & Units 04/10/23 10:00   ACTH Plasma 7.2 - 63.3 pg/mL <1.5 (L)        5.  Steroid-induced osteopenia:  The presumptive diagnosis based on  #5 above.    Bone density scan was ordered by Dr. Manrique, patient did his bone density scan at [facility] I do not have results-    Bone density scan on 10/21/2022 showed  A lumbar T score of -0.8, a right femur T score of -2.2   Latest Reference Range & Units 06/09/23 04:48   Calcium 8.6 - 10.4 mg/dL 9.2 (E)       6.  Vitamin D deficiency:  Currently taking 5000 IUs           ROS:  Feeling very well with no additional  "complaints.      Allergies:   Allergies   Allergen Reactions    Sulfa Drugs      Severe muscle pains       Current medicines including changes today:  Current Outpatient Medications   Medication Sig Dispense Refill    rivaroxaban (XARELTO) 20 MG Tab tablet Take 1 Tablet by mouth with dinner. 90 Tablet 3    DILTIAZem CD (CARDIZEM CD) 120 MG CAPSULE SR 24 HR Take 1 Capsule by mouth every day. 90 Capsule 3    flecainide (TAMBOCOR) 100 MG Tab Take 1 Tablet by mouth 2 times a day. 180 Tablet 3    felodipine ER (PLENDIL) 2.5 MG TABLET SR 24 HR Take 1 Tablet by mouth every day. 90 Tablet 3    simvastatin (ZOCOR) 20 MG Tab Take 1 Tablet by mouth every morning. 90 Tablet 3    traMADol (ULTRAM) 50 MG Tab 1 TABLET TWICE A DAY AS NEEDED QTY 60 FOR 30 DAY SUPPLY. DX: M47.816. FWD      levothyroxine (SYNTHROID) 150 MCG Tab Take 1 Tablet by mouth every day. 90 Tablet 4    hydrocortisone (CORTEF) 10 MG Tab Take 1 Tablet by mouth 4 times a day. 336 Tablet 4    SYRINGE-NEEDLE, DISP, 3 ML (BD LUER-LOCK SYRINGE) 18G X 1-1/2\" 3 ML Misc 1 Each every 14 days. Use to draw up testosterone 30 Each 11    TRELEGY ELLIPTA 100-62.5-25 MCG/ACT AEROSOL POWDER, BREATH ACTIVATED inhalation inhale 1 puff by mouth and INTO THE LUNGS once daily 3 Each 3    fluticasone-umeclidin-vilant (TRELEGY ELLIPTA) 100-62.5-25 MCG/ACT AEROSOL POWDER, BREATH ACTIVATED inhalation Inhale 1 Inhalation every day. 33 Each 3    nicotine (NICODERM) 14 MG/24HR PATCH 24 HR apply 1 patch TO CLEAN, DRY, AND INTACT SKIN REMOVE AND REPLACE once daily      acetaminophen (TYLENOL) 325 MG Tab acetaminophen 325 mg tablet   take 2 tablets by mouth three times a day if needed for pain for 5 days      omeprazole (PRILOSEC) 20 MG CPDR Take 20 mg by mouth every day.       No current facility-administered medications for this visit.        Past Medical History:   Diagnosis Date    Adrenal disorder (HCC)     Arrhythmia     Breath shortness     Cancer (HCC)     melanoma in 2007, original " "1989 from back of neck (chemo)    Cataract     Emphysema of lung (HCC)     Heart burn     Hypertension     Indigestion     Metastatic melanoma (HCC) 1990 / 2012    original neck lesion resected 1990 / recurrent lesion resected 2007 / third lesion resected 2012 /recurrence in 2016    Thyroid disease        PHYSICAL EXAM:    /68 (BP Location: Left arm, Patient Position: Sitting, BP Cuff Size: Adult)   Pulse 94   Ht 1.829 m (6')   Wt 91.3 kg (201 lb 4.8 oz)   SpO2 94%   BMI 27.30 kg/m²     Gen. overweight but otherwise appears healthy             No cushingoid features    Skin   appropriate for sex and age             No ecchymoses, purpura, thin skin or abdominal striae consistent with excessive cortisol effect    HEENT  unremarkable    Neck   no adenopathy, I cannot feel his thyroid.    Breasts    normal male fatty tissue.  No gynecomastia    Heart  regular    Extremities  no edema    Neuro  gait and station normal    Psych  appropriate      ASSESSMENT AND RECOMMENDATIONS  1. Primary testicular hypogonadism  Clinically unstable  Biochemically unstable  We will increase his testosterone to 0.7 mL's every 14 days to see if it improves his symptoms  - testosterone cypionate (DEPO-TESTOSTERONE) 200 MG/ML Solution injection; Inject 0.7 mL into the shoulder, thigh, or buttocks every 14 days for 90 days.  Dispense: 6 mL; Refill: 0  - PROLACTIN; Future  - IGF-1 SOMATOMEDIN; Future  - PTH INTACT (PTH ONLY); Future  - IONIZED CALCIUM; Future  - SYRINGE-NEEDLE, DISP, 3 ML (B-D INTEGRA SYRINGE) 23G X 1\" 3 ML Misc; 23 g every 14 days.  Dispense: 30 Each; Refill: 11    2. Polycythemia, secondary  Unstable  Underlying sleep apnea  Follow up PCP and pulmonology     3. Hypothyroidism, acquired  Clinically unstable  Biochemically stable  We will continue the same regimen-see HPI    4. Adrenal insufficiency (HCC)  Clinically unstable  Currently on max dose of hydrocortisone, we will continue the same dose and evaluate " hormones for diagnoses #1     5. Steroid-induced osteopenia  Stable  Continue regimen-HPI    6. Vitamin D deficiency  Unstable  Continue treatment-HPI    Disposition: Make an appointment to follow-up in 4 weeks  Do your blood work 2 weeks before your next appointment    I spent 53 minutes on this visit, precharting, discussing diagnosis with patient, plan of treatment, symptoms      MARII Herman  10/18/2022

## 2023-08-02 ENCOUNTER — APPOINTMENT (OUTPATIENT)
Dept: SLEEP MEDICINE | Facility: MEDICAL CENTER | Age: 73
End: 2023-08-02
Attending: INTERNAL MEDICINE
Payer: MEDICARE

## 2023-08-02 ENCOUNTER — APPOINTMENT (OUTPATIENT)
Dept: SLEEP MEDICINE | Facility: MEDICAL CENTER | Age: 73
End: 2023-08-02
Payer: MEDICARE

## 2023-08-02 VITALS — HEIGHT: 70 IN | WEIGHT: 201.9 LBS | BODY MASS INDEX: 28.9 KG/M2

## 2023-08-02 DIAGNOSIS — J96.11 CHRONIC RESPIRATORY FAILURE WITH HYPOXIA (HCC): ICD-10-CM

## 2023-08-02 DIAGNOSIS — J44.9 CHRONIC OBSTRUCTIVE PULMONARY DISEASE, UNSPECIFIED COPD TYPE (HCC): ICD-10-CM

## 2023-08-02 PROCEDURE — 94726 PLETHYSMOGRAPHY LUNG VOLUMES: CPT | Performed by: INTERNAL MEDICINE

## 2023-08-02 PROCEDURE — 94060 EVALUATION OF WHEEZING: CPT | Mod: 26 | Performed by: INTERNAL MEDICINE

## 2023-08-02 PROCEDURE — 94726 PLETHYSMOGRAPHY LUNG VOLUMES: CPT | Mod: 26 | Performed by: INTERNAL MEDICINE

## 2023-08-02 PROCEDURE — 94729 DIFFUSING CAPACITY: CPT | Mod: 26 | Performed by: INTERNAL MEDICINE

## 2023-08-02 PROCEDURE — 94060 EVALUATION OF WHEEZING: CPT | Performed by: INTERNAL MEDICINE

## 2023-08-02 PROCEDURE — 94729 DIFFUSING CAPACITY: CPT | Performed by: INTERNAL MEDICINE

## 2023-08-02 ASSESSMENT — PULMONARY FUNCTION TESTS
FEV1_PERCENT_PREDICTED: 88
FEV1/FVC_PERCENT_CHANGE: 3
FEV1_LLN: 2.62
FVC: 5.06
FEV1/FVC_PERCENT_PREDICTED: 70
FEV1/FVC: 54.55
FVC_PERCENT_PREDICTED: 120
FEV1/FVC: 53
FEV1/FVC_PERCENT_PREDICTED: 73
FVC_PREDICTED: 4.17
FEV1/FVC: 52
FEV1_PERCENT_PREDICTED: 84
FEV1/FVC_PREDICTED: 76
FEV1/FVC_PERCENT_PREDICTED: 72
FVC_PERCENT_PREDICTED: 121
FEV1/FVC_PERCENT_LLN: 63
FVC_LLN: 3.48
FEV1_PREDICTED: 3.14
FVC: 5.03
FEV1/FVC: 55
FEV1/FVC_PERCENT_PREDICTED: 75
FEV1: 2.64
FEV1: 2.76
FEV1/FVC_PERCENT_PREDICTED: 69
FEV1_PERCENT_CHANGE: 3
FEV1_PERCENT_CHANGE: 0

## 2023-08-02 ASSESSMENT — FIBROSIS 4 INDEX: FIB4 SCORE: 3.18

## 2023-08-02 NOTE — PROCEDURES
Tech: Michelle Marie, RT  Good patient effort & cooperation.  Test was performed on the [employer] Body Plethysmograph- Elite DX system.  The predicted sets used for Spirometry are GLI-2012, for Lung Volumes are ITS, and for DLCO is GLI 2017.  The results of this test meet the ATS standards for acceptability and repeatability.  The DLCO was uncorrected for Hb.  A bronchodilator of Ventolin HFA 2 puffs via spacer was administered.  DLCO was performed during dilation period.    Interpretation:   Acceptable and Reproducible  FEV1 2.64 l (84 %), FVC 5.03l (120%), ratio 53%  Flow Volume loops c/w obstruction  TLC 8.77 l (124%)  DLCO 17.6 ml/min/mmHg (68%)    Impression:  Mild obstruction with airtrapping and mild reduction in gas transfer. No response to bronchodilator

## 2023-08-15 ENCOUNTER — OFFICE VISIT (OUTPATIENT)
Dept: ENDOCRINOLOGY | Facility: MEDICAL CENTER | Age: 73
End: 2023-08-15
Payer: MEDICARE

## 2023-08-15 VITALS
HEART RATE: 81 BPM | SYSTOLIC BLOOD PRESSURE: 110 MMHG | OXYGEN SATURATION: 92 % | WEIGHT: 206 LBS | DIASTOLIC BLOOD PRESSURE: 70 MMHG | BODY MASS INDEX: 27.9 KG/M2 | HEIGHT: 72 IN

## 2023-08-15 DIAGNOSIS — E29.1 PRIMARY TESTICULAR HYPOGONADISM: ICD-10-CM

## 2023-08-15 DIAGNOSIS — E55.9 VITAMIN D DEFICIENCY: ICD-10-CM

## 2023-08-15 DIAGNOSIS — E03.9 HYPOTHYROIDISM, ACQUIRED: ICD-10-CM

## 2023-08-15 DIAGNOSIS — R73.03 PREDIABETES: ICD-10-CM

## 2023-08-15 DIAGNOSIS — E27.40 ADRENAL INSUFFICIENCY (HCC): ICD-10-CM

## 2023-08-15 DIAGNOSIS — D75.1 POLYCYTHEMIA, SECONDARY: ICD-10-CM

## 2023-08-15 DIAGNOSIS — M85.80 STEROID-INDUCED OSTEOPENIA: ICD-10-CM

## 2023-08-15 DIAGNOSIS — T38.0X5A STEROID-INDUCED OSTEOPENIA: ICD-10-CM

## 2023-08-15 PROCEDURE — 3074F SYST BP LT 130 MM HG: CPT

## 2023-08-15 PROCEDURE — 3078F DIAST BP <80 MM HG: CPT

## 2023-08-15 PROCEDURE — 99211 OFF/OP EST MAY X REQ PHY/QHP: CPT

## 2023-08-15 PROCEDURE — 99214 OFFICE O/P EST MOD 30 MIN: CPT

## 2023-08-15 RX ORDER — TESTOSTERONE CYPIONATE 200 MG/ML
200 INJECTION, SOLUTION INTRAMUSCULAR
Qty: 6 ML | Refills: 0 | Status: SHIPPED | OUTPATIENT
Start: 2023-08-15 | End: 2023-11-13

## 2023-08-15 RX ORDER — NEEDLES, FILTER 19GX1 1/2"
23 NEEDLE, DISPOSABLE MISCELLANEOUS
Qty: 30 EACH | Refills: 11 | Status: SHIPPED | OUTPATIENT
Start: 2023-08-15 | End: 2023-12-28 | Stop reason: SDUPTHER

## 2023-08-15 RX ORDER — ERGOCALCIFEROL 1.25 MG/1
50000 CAPSULE ORAL
Qty: 12 CAPSULE | Refills: 0 | Status: SHIPPED | OUTPATIENT
Start: 2023-08-15 | End: 2023-11-21 | Stop reason: SDUPTHER

## 2023-08-15 RX ORDER — LEVOTHYROXINE SODIUM 0.15 MG/1
150 TABLET ORAL DAILY
Qty: 90 TABLET | Refills: 4 | Status: SHIPPED | OUTPATIENT
Start: 2023-08-15 | End: 2023-11-21 | Stop reason: SDUPTHER

## 2023-08-15 ASSESSMENT — FIBROSIS 4 INDEX: FIB4 SCORE: 3.18

## 2023-08-15 NOTE — PROGRESS NOTES
HPI: Maykel is a 72 year old male here to establish care for the following issues       1. Primary testicular hypogonadism  Well documented low testosterone and elevated LH levels  Testosterone is helpful with normal libido, and energy levels  Reports fatigue, muscle, brain fogginess, weight loss   Recent proctectomy due to enlarge prostate-neg for cancer     Current testosterone replacement-testosterone cypionate 0.7mls every 14 days  Does his blood work in between injections                   FV by [facility] for Flomax       2. Polycythemia, secondary  Normal testosterone levels  History of COPD with nocturnal oxygen-2L   Sleep apnea has been ruled out          3. Hypothyroidism, acquired:   Clinically euthyroid taking levothyroxine 150 mcg/day.    Taking his thyroid hormone replacement prior to breakfast, on an empty stomach  Denies taking iron, calcium, antiacids   Denies taking biotin or multivatims     Denies palpitation, tremors  Reports fatigue, muscle, brain fogginess, weight loss           4. Adrenal insufficiency (HCC)  Diagnosed condition by Dr. Manrique, possibly related to previous metastatic melanoma for which he took, steroids for more than 6 months  Currently taking hydrocortisone 20 mg in the morning and 20 mg in the afternoon  He takes an extra 10 mg dose for the stress  Denies cushingoid symptoms     Reports fatigue, muscle weakness, brain fogginess, weight loss              5.  Steroid-induced osteopenia:  The presumptive diagnosis based on  #5 above.    Bone density scan was ordered by Dr. Manrique, patient did his bone density scan at [facility]    Bone density scan on 10/21/2022 showed  A lumbar T score of -0.8, a right femur T score of -2.2            6.  Vitamin D deficiency:  Currently taking 5000 IUs           ROS:  Feeling very well with no additional complaints.      Allergies:   Allergies   Allergen Reactions    Sulfa Drugs      Severe muscle pains       Current medicines including  "changes today:  Current Outpatient Medications   Medication Sig Dispense Refill    testosterone cypionate (DEPO-TESTOSTERONE) 200 MG/ML Solution injection Inject 0.7 mL into the shoulder, thigh, or buttocks every 14 days for 90 days. 6 mL 0    SYRINGE-NEEDLE, DISP, 3 ML (B-D INTEGRA SYRINGE) 23G X 1\" 3 ML Misc 23 g every 14 days. 30 Each 11    rivaroxaban (XARELTO) 20 MG Tab tablet Take 1 Tablet by mouth with dinner. 90 Tablet 3    DILTIAZem CD (CARDIZEM CD) 120 MG CAPSULE SR 24 HR Take 1 Capsule by mouth every day. 90 Capsule 3    flecainide (TAMBOCOR) 100 MG Tab Take 1 Tablet by mouth 2 times a day. 180 Tablet 3    felodipine ER (PLENDIL) 2.5 MG TABLET SR 24 HR Take 1 Tablet by mouth every day. 90 Tablet 3    simvastatin (ZOCOR) 20 MG Tab Take 1 Tablet by mouth every morning. 90 Tablet 3    traMADol (ULTRAM) 50 MG Tab 1 TABLET TWICE A DAY AS NEEDED QTY 60 FOR 30 DAY SUPPLY. DX: M47.816. FWD      levothyroxine (SYNTHROID) 150 MCG Tab Take 1 Tablet by mouth every day. 90 Tablet 4    hydrocortisone (CORTEF) 10 MG Tab Take 1 Tablet by mouth 4 times a day. 336 Tablet 4    TRELEGY ELLIPTA 100-62.5-25 MCG/ACT AEROSOL POWDER, BREATH ACTIVATED inhalation inhale 1 puff by mouth and INTO THE LUNGS once daily 3 Each 3    fluticasone-umeclidin-vilant (TRELEGY ELLIPTA) 100-62.5-25 MCG/ACT AEROSOL POWDER, BREATH ACTIVATED inhalation Inhale 1 Inhalation every day. 33 Each 3    nicotine (NICODERM) 14 MG/24HR PATCH 24 HR apply 1 patch TO CLEAN, DRY, AND INTACT SKIN REMOVE AND REPLACE once daily      acetaminophen (TYLENOL) 325 MG Tab acetaminophen 325 mg tablet   take 2 tablets by mouth three times a day if needed for pain for 5 days      omeprazole (PRILOSEC) 20 MG CPDR Take 20 mg by mouth every day.       No current facility-administered medications for this visit.        Past Medical History:   Diagnosis Date    Adrenal disorder (HCC)     Arrhythmia     Breath shortness     Cancer (HCC)     melanoma in 2007, original 1989 from " "back of neck (chemo)    Cataract     Emphysema of lung (HCC)     Heart burn     Hypertension     Indigestion     Metastatic melanoma (HCC) 1990 / 2012    original neck lesion resected 1990 / recurrent lesion resected 2007 / third lesion resected 2012 /recurrence in 2016    Thyroid disease        PHYSICAL EXAM:    /70 (BP Location: Left arm, Patient Position: Sitting, BP Cuff Size: Adult)   Pulse 81   Ht 1.829 m (6')   Wt 93.4 kg (206 lb)   SpO2 92%   BMI 27.94 kg/m²     Gen. overweight but otherwise appears healthy             No cushingoid features        ASSESSMENT AND RECOMMENDATIONS  1. Primary testicular hypogonadism  Clinically unstable  Biochemically stable, with testosterone levels in the middle normal range  Testosterone cypionate increased to 200 mg every 14 days    - testosterone cypionate (DEPO-TESTOSTERONE) 200 MG/ML Solution injection; Inject 1 mL into the shoulder, thigh, or buttocks every 14 days for 90 days.  Dispense: 6 mL; Refill: 0  - SYRINGE-NEEDLE, DISP, 3 ML (B-D INTEGRA SYRINGE) 23G X 1\" 3 ML Misc; 23 g every 14 days.  Dispense: 30 Each; Refill: 11  - Testosterone, Free & Total, Adult Male (w/SHBG); Future    2. Polycythemia, secondary  Unstable  Underlying sleep apnea  Follow by PCP and pulmonology     3. Hypothyroidism, acquired  Clinically unstable  Biochemically stable  Brand-name Synthroid 150 mcg sent to pharmacy, if insurance does not cover his brand-name medication he will send me a message so I can send a 2 Synthroid delivers  - levothyroxine (SYNTHROID) 150 MCG Tab; Take 1 Tablet by mouth every day.  Dispense: 90 Tablet; Refill: 4  - TSH; Future  - FREE THYROXINE; Future  - Comp Metabolic Panel; Future    4. Adrenal insufficiency (HCC)  Clinically unstable  Currently taking a little over the max dose of hydrocortisone daily-discussed with him the risks of taking too much hydrocortisone replacement-cushingoid symptoms    5. Steroid-induced osteopenia  Stable  Continue " regimen-HPI    6. Vitamin D deficiency  Unstable  Ergocalciferol 1.25 mg weekly sent to pharmacy for a 3-month supply  - vitamin D2, Ergocalciferol, (DRISDOL) 1.25 MG (95248 UT) Cap capsule; Take 1 Capsule by mouth every 7 days.  Dispense: 12 Capsule; Refill: 0    7. Prediabetes  Patient reports a history of prediabetes  - HEMOGLOBIN A1C; Future       Disposition: Make an appointment to follow-up in 3 months  Do your blood work 2 weeks before your next appointment        Jerome Seymour A.P.R.N.  08/15/23

## 2023-09-28 ENCOUNTER — OFFICE VISIT (OUTPATIENT)
Dept: SLEEP MEDICINE | Facility: MEDICAL CENTER | Age: 73
End: 2023-09-28
Payer: MEDICARE

## 2023-09-28 VITALS
WEIGHT: 195 LBS | SYSTOLIC BLOOD PRESSURE: 130 MMHG | HEART RATE: 72 BPM | DIASTOLIC BLOOD PRESSURE: 76 MMHG | BODY MASS INDEX: 26.41 KG/M2 | HEIGHT: 72 IN | OXYGEN SATURATION: 94 % | RESPIRATION RATE: 16 BRPM

## 2023-09-28 DIAGNOSIS — Z23 NEED FOR VACCINATION: ICD-10-CM

## 2023-09-28 DIAGNOSIS — J43.2 CENTRILOBULAR EMPHYSEMA (HCC): ICD-10-CM

## 2023-09-28 DIAGNOSIS — J96.11 CHRONIC RESPIRATORY FAILURE WITH HYPOXIA (HCC): ICD-10-CM

## 2023-09-28 DIAGNOSIS — Z72.0 TOBACCO ABUSE: ICD-10-CM

## 2023-09-28 DIAGNOSIS — J44.9 CHRONIC OBSTRUCTIVE PULMONARY DISEASE, UNSPECIFIED COPD TYPE (HCC): ICD-10-CM

## 2023-09-28 PROCEDURE — 99213 OFFICE O/P EST LOW 20 MIN: CPT | Mod: 25

## 2023-09-28 PROCEDURE — 3075F SYST BP GE 130 - 139MM HG: CPT

## 2023-09-28 PROCEDURE — 90662 IIV NO PRSV INCREASED AG IM: CPT

## 2023-09-28 PROCEDURE — 3078F DIAST BP <80 MM HG: CPT

## 2023-09-28 RX ORDER — ALBUTEROL SULFATE 90 UG/1
2 AEROSOL, METERED RESPIRATORY (INHALATION) EVERY 6 HOURS PRN
Qty: 1 EACH | Refills: 11 | Status: SHIPPED | OUTPATIENT
Start: 2023-09-28

## 2023-09-28 RX ORDER — FLUTICASONE FUROATE, UMECLIDINIUM BROMIDE AND VILANTEROL TRIFENATATE 100; 62.5; 25 UG/1; UG/1; UG/1
1 POWDER RESPIRATORY (INHALATION) DAILY
Qty: 90 EACH | Refills: 3 | Status: SHIPPED | OUTPATIENT
Start: 2023-09-28

## 2023-09-28 ASSESSMENT — ENCOUNTER SYMPTOMS
SINUS PAIN: 0
SHORTNESS OF BREATH: 0
WEAKNESS: 0
SPUTUM PRODUCTION: 0
COUGH: 0
FEVER: 0
HEARTBURN: 0
VOMITING: 0
DIAPHORESIS: 0
HEMOPTYSIS: 0
MYALGIAS: 0
HEADACHES: 0
CHILLS: 0
DIZZINESS: 0
DIARRHEA: 0
NAUSEA: 0
FALLS: 0
WHEEZING: 0
PALPITATIONS: 0

## 2023-09-28 ASSESSMENT — FIBROSIS 4 INDEX: FIB4 SCORE: 3.18

## 2023-09-28 ASSESSMENT — PATIENT HEALTH QUESTIONNAIRE - PHQ9: CLINICAL INTERPRETATION OF PHQ2 SCORE: 0

## 2023-09-28 NOTE — PROGRESS NOTES
Pulmonary Clinic Note    Date of Visit: 9/28/2023     Chief Complaint:  Chief Complaint   Patient presents with    Follow-Up     Last seen 12/12/22 Dr. Baugh     Results     PFT 8/2/23     HPI:   Artem Joseph is a very pleasant 73 y.o. year old male current current smoker (smokes 1/2 pack/day 40 pack-years), with a PMHx of COPD, metastatic melanoma HTN, GERD, hypothyroidism who presented to the Pulmonary Clinic for a regular follow up. Last seen in the office on 12/12/2022 with Dr. Baugh.     Patient is followed by the pulmonary office for COPD.  PFTs in 2022 show an FVC of 5.10 L or 121%, FEV1 2.46 L or 77%, FEV1/FVC 48%, RV 97%, %, DLCO 65% predicted.  Repeat PFTs in 2023 showed mild obstruction with a FVC of 5.03 L or 120%, FEV1 2.64 L or 84%, FEV1/FVC 53%, %, %, DLCO 68% predicted, without positive bronchodilator response.  CT chest, abdomen, pelvis in 2020 shows emphysema and peripheral interlobular septal thickening with GGO in anterior lingula and RLL, which has been stable.  Patient is currently on Trelegy 100 and albuterol as needed.  Patient is followed by oncology for a history of metastatic melanoma and receives surveillance imaging.  He is on 2-3 LPM bleed in oxygen.      Interval events:  9/28/2023-  Patient states his breathing has been stable.  Symptomatically, he denies any significant shortness of breath, cough, sputum production, or wheezing.  He continues to use benefit from Trelegy 100 and rarely uses albuterol.  He has not had any exacerbations.  He uses 2-3 LPM of oxygen depending on exertion level.  Patient is very active, and is able to maintain his 4 acre lot in [place].    Exacerbations this year:  0    Current medication regimen: Trelegy 100 and albuterol    Oxygen use:  2-3 LPM    MMRC Grade: 0- Breathless only during strenuous exercise    Past Medical History:   Diagnosis Date    Adrenal disorder (HCC)     Arrhythmia     Breath shortness     Cancer (HCC)      melanoma in 2007, original 1989 from back of neck (chemo)    Cataract     Emphysema of lung (HCC)     Heart burn     Hypertension     Indigestion     Metastatic melanoma (HCC) 1990 / 2012    original neck lesion resected 1990 / recurrent lesion resected 2007 / third lesion resected 2012 /recurrence in 2016    Thyroid disease      Past Surgical History:   Procedure Laterality Date    CATARACT PHACO WITH IOL Left 11/6/2018    Procedure: CATARACT PHACO WITH IOL;  Surgeon: Shravan España M.D.;  Location: SURGERY SAME DAY [facility] ORS;  Service: Ophthalmology    CATARACT PHACO WITH IOL Right 10/16/2018    Procedure: CATARACT PHACO WITH IOL;  Surgeon: Shravan España M.D.;  Location: SURGERY SAME DAY [facility] ORS;  Service: Ophthalmology    COLONOSCOPY - ENDO  10/9/2016    Procedure: COLONOSCOPY - ENDO;  Surgeon: William Mitchell M.D.;  Location: SURGERY [facility];  Service:     RECOVERY  7/26/2016    Procedure: CT-CT Guided abdominal mass-;  Surgeon: [facility];  Location: SURGERY PRE-POST PROC UNIT [facility];  Service:     MASS EXCISION GENERAL  3/9/2012    Performed by MARLEN KEEN at SURGERY SAME DAY [facility] ORS    OTHER      lymph nodes back of neck     Social History     Socioeconomic History    Marital status:      Spouse name: Not on file    Number of children: Not on file    Years of education: Not on file    Highest education level: Not on file   Occupational History    Not on file   Tobacco Use    Smoking status: Every Day     Current packs/day: 1.00     Average packs/day: 1 pack/day for 40.0 years (40.0 ttl pk-yrs)     Types: Cigarettes     Passive exposure: Past    Smokeless tobacco: Never    Tobacco comments:     did vape, but has not quit 10/2019, 1/2-1 PPD   Vaping Use    Vaping Use: Former    Substances: Nicotine    Passive vaping exposure: Yes   Substance and Sexual Activity    Alcohol use: Yes     Comment: 1 drink per month     Drug use: No    Sexual activity: Not on file  "  Other Topics Concern    Not on file   Social History Narrative    Not on file     Social Determinants of Health     Financial Resource Strain: Not on file   Food Insecurity: Not on file   Transportation Needs: Not on file   Physical Activity: Not on file   Stress: Not on file   Social Connections: Not on file   Intimate Partner Violence: Not on file   Housing Stability: Not on file        Family History   Problem Relation Age of Onset    Heart Disease Mother     Cancer Father     Heart Disease Maternal Grandmother     Heart Disease Maternal Grandfather     Cancer Paternal Grandfather      Current Outpatient Medications on File Prior to Visit   Medication Sig Dispense Refill    levothyroxine (SYNTHROID) 150 MCG Tab Take 1 Tablet by mouth every day. 90 Tablet 4    testosterone cypionate (DEPO-TESTOSTERONE) 200 MG/ML Solution injection Inject 1 mL into the shoulder, thigh, or buttocks every 14 days for 90 days. 6 mL 0    SYRINGE-NEEDLE, DISP, 3 ML (B-D INTEGRA SYRINGE) 23G X 1\" 3 ML Misc 23 g every 14 days. 30 Each 11    vitamin D2, Ergocalciferol, (DRISDOL) 1.25 MG (97930 UT) Cap capsule Take 1 Capsule by mouth every 7 days. 12 Capsule 0    rivaroxaban (XARELTO) 20 MG Tab tablet Take 1 Tablet by mouth with dinner. 90 Tablet 3    DILTIAZem CD (CARDIZEM CD) 120 MG CAPSULE SR 24 HR Take 1 Capsule by mouth every day. 90 Capsule 3    flecainide (TAMBOCOR) 100 MG Tab Take 1 Tablet by mouth 2 times a day. 180 Tablet 3    felodipine ER (PLENDIL) 2.5 MG TABLET SR 24 HR Take 1 Tablet by mouth every day. 90 Tablet 3    simvastatin (ZOCOR) 20 MG Tab Take 1 Tablet by mouth every morning. 90 Tablet 3    hydrocortisone (CORTEF) 10 MG Tab Take 1 Tablet by mouth 4 times a day. 336 Tablet 4    nicotine (NICODERM) 14 MG/24HR PATCH 24 HR apply 1 patch TO CLEAN, DRY, AND INTACT SKIN REMOVE AND REPLACE once daily      acetaminophen (TYLENOL) 325 MG Tab acetaminophen 325 mg tablet   take 2 tablets by mouth three times a day if needed for " pain for 5 days      omeprazole (PRILOSEC) 20 MG CPDR Take 20 mg by mouth every day.      traMADol (ULTRAM) 50 MG Tab 1 TABLET TWICE A DAY AS NEEDED QTY 60 FOR 30 DAY SUPPLY. DX: M47.816. FWD (Patient not taking: Reported on 9/28/2023)       No current facility-administered medications on file prior to visit.     Allergies: Sulfa drugs    ROS:   Review of Systems   Constitutional:  Negative for chills, diaphoresis, fever and malaise/fatigue.   HENT:  Negative for congestion and sinus pain.    Respiratory:  Negative for cough, hemoptysis, sputum production, shortness of breath and wheezing.    Cardiovascular:  Negative for chest pain, palpitations and leg swelling.   Gastrointestinal:  Negative for diarrhea, heartburn, nausea and vomiting.   Musculoskeletal:  Negative for falls and myalgias.   Neurological:  Negative for dizziness, weakness and headaches.     Vitals:  /76 (BP Location: Right arm, Patient Position: Sitting, BP Cuff Size: Adult)   Pulse 72   Resp 16   Ht 1.829 m (6')   Wt 88.5 kg (195 lb)   SpO2 94%     Physical Exam  Constitutional:       General: He is not in acute distress.     Appearance: Normal appearance. He is not ill-appearing, toxic-appearing or diaphoretic.   Cardiovascular:      Rate and Rhythm: Normal rate and regular rhythm.      Heart sounds: No murmur heard.     No friction rub. No gallop.   Pulmonary:      Effort: No respiratory distress.      Breath sounds: Normal breath sounds. No stridor. No wheezing, rhonchi or rales.   Musculoskeletal:         General: No swelling.      Right lower leg: No edema.      Left lower leg: No edema.   Skin:     General: Skin is warm.   Neurological:      General: No focal deficit present.      Mental Status: He is alert and oriented to person, place, and time.   Psychiatric:         Mood and Affect: Mood normal.         Behavior: Behavior normal.         Thought Content: Thought content normal.         Judgment: Judgment normal.          Laboratory Data:  PFTs: (Date: 8/2/2023)-      Impression:  Mild obstruction with airtrapping and mild reduction in gas transfer. No response to bronchodilator     CT Chest, abdomen, pelvis: (Date: 8/5/2020)-  Impression:  1. No CT evidence of metastatic disease in the chest, abdomen or pelvis.   2. Peripheral interlobular septal thickening with groundglass opacity in the anterior lingula and right lower lobe, similar to prior, could relate to early interstitial lung disease.  3. Fat-containing umbilical hernia.  4. Sigmoid diverticulosis.      Assessment and Plan:    Problem List Items Addressed This Visit       COPD (chronic obstructive pulmonary disease) (HCC)     PFTs in 2022 show an FVC of 5.10 L or 121%, FEV1 2.46 L or 77%, FEV1/FVC 48%, RV 97%, %, DLCO 65% predicted.  Repeat PFTs in 2023 showed mild obstruction with a FVC of 5.03 L or 120%, FEV1 2.64 L or 84%, FEV1/FVC 53%, %, %, DLCO 68% predicted, without positive bronchodilator response.  CT chest, abdomen, pelvis in 2020 shows emphysema.  Patient is currently on Trelegy 100, and rare use of albuterol.  He has not had any exacerbations.  Gold grade A.  -- Continue Trelegy 100  -- Continue albuterol as needed  -- Advised patient to increase exercise as tolerated  -- Advised patient remain vaccine against influenza and pneumococcal         Relevant Medications    fluticasone-umeclidinium-vilanterol (TRELEGY ELLIPTA) 100-62.5-25 mcg/act inhaler    albuterol 108 (90 Base) MCG/ACT Aero Soln inhalation aerosol    Chronic respiratory failure with hypoxia (HCC)     Patient continues to use and benefit from 2-3 LPM of oxygen depending on exertion level.  -- Continue oxygen use and monitor saturations to keep between 90-92%         Tobacco abuse     Patient is a current smoker, smoking half pack per day.  -- Encourage smoking cessation           Other Visit Diagnoses       Need for vaccination        Relevant Orders    INFLUENZA VACCINE,  HIGH DOSE (65+ ONLY)          Diagnostic studies have been reviewed with the patient.    Return in about 1 year (around 9/28/2024), or if symptoms worsen or fail to improve, for COPD, with [facility].     This note was generated using voice recognition software which has a chance of producing errors of grammar and possibly content.  I have made every reasonable attempt to find and correct any obvious errors, but it should be expected that some may not be found prior to finalization of this note.    Time spent in record review prior to patient arrival, reviewing results, and in face-to-face encounter totaled 28 min.  __________  YOGI Don  [facility]  [facility]

## 2023-09-28 NOTE — ASSESSMENT & PLAN NOTE
PFTs in 2022 show an FVC of 5.10 L or 121%, FEV1 2.46 L or 77%, FEV1/FVC 48%, RV 97%, %, DLCO 65% predicted.  Repeat PFTs in 2023 showed mild obstruction with a FVC of 5.03 L or 120%, FEV1 2.64 L or 84%, FEV1/FVC 53%, %, %, DLCO 68% predicted, without positive bronchodilator response.  CT chest, abdomen, pelvis in 2020 shows emphysema.  Patient is currently on Trelegy 100, and rare use of albuterol.  He has not had any exacerbations.  Gold grade A.  -- Continue Trelegy 100  -- Continue albuterol as needed  -- Advised patient to increase exercise as tolerated  -- Advised patient remain vaccine against influenza and pneumococcal

## 2023-09-28 NOTE — ASSESSMENT & PLAN NOTE
Patient continues to use and benefit from 2-3 LPM of oxygen depending on exertion level.  -- Continue oxygen use and monitor saturations to keep between 90-92%

## 2023-11-21 ENCOUNTER — PHARMACY VISIT (OUTPATIENT)
Dept: PHARMACY | Facility: MEDICAL CENTER | Age: 73
End: 2023-11-21
Payer: COMMERCIAL

## 2023-11-21 ENCOUNTER — OFFICE VISIT (OUTPATIENT)
Dept: ENDOCRINOLOGY | Facility: MEDICAL CENTER | Age: 73
End: 2023-11-21
Payer: MEDICARE

## 2023-11-21 VITALS
DIASTOLIC BLOOD PRESSURE: 64 MMHG | SYSTOLIC BLOOD PRESSURE: 112 MMHG | OXYGEN SATURATION: 96 % | WEIGHT: 195.1 LBS | BODY MASS INDEX: 26.43 KG/M2 | HEIGHT: 72 IN | HEART RATE: 78 BPM

## 2023-11-21 DIAGNOSIS — M85.80 STEROID-INDUCED OSTEOPENIA: ICD-10-CM

## 2023-11-21 DIAGNOSIS — E27.40 ADRENAL INSUFFICIENCY (HCC): ICD-10-CM

## 2023-11-21 DIAGNOSIS — E03.9 HYPOTHYROIDISM, ACQUIRED: ICD-10-CM

## 2023-11-21 DIAGNOSIS — Z79.4 TYPE 2 DIABETES MELLITUS WITHOUT COMPLICATION, WITH LONG-TERM CURRENT USE OF INSULIN (HCC): ICD-10-CM

## 2023-11-21 DIAGNOSIS — E11.9 TYPE 2 DIABETES MELLITUS WITHOUT COMPLICATION, WITH LONG-TERM CURRENT USE OF INSULIN (HCC): ICD-10-CM

## 2023-11-21 DIAGNOSIS — D75.1 ERYTHROCYTOSIS: ICD-10-CM

## 2023-11-21 DIAGNOSIS — E55.9 VITAMIN D DEFICIENCY: ICD-10-CM

## 2023-11-21 DIAGNOSIS — E29.1 PRIMARY TESTICULAR HYPOGONADISM: ICD-10-CM

## 2023-11-21 DIAGNOSIS — T38.0X5A STEROID-INDUCED OSTEOPENIA: ICD-10-CM

## 2023-11-21 PROCEDURE — 3078F DIAST BP <80 MM HG: CPT

## 2023-11-21 PROCEDURE — RXMED WILLOW AMBULATORY MEDICATION CHARGE

## 2023-11-21 PROCEDURE — 99215 OFFICE O/P EST HI 40 MIN: CPT

## 2023-11-21 PROCEDURE — 3074F SYST BP LT 130 MM HG: CPT

## 2023-11-21 PROCEDURE — 99213 OFFICE O/P EST LOW 20 MIN: CPT

## 2023-11-21 RX ORDER — TESTOSTERONE CYPIONATE 200 MG/ML
140 INJECTION, SOLUTION INTRAMUSCULAR
Qty: 6 ML | Refills: 0 | Status: SHIPPED | OUTPATIENT
Start: 2023-11-21 | End: 2023-11-21

## 2023-11-21 RX ORDER — PEN NEEDLE, DIABETIC 32GX 5/32"
NEEDLE, DISPOSABLE MISCELLANEOUS
Qty: 400 EACH | Refills: 3 | Status: SHIPPED | OUTPATIENT
Start: 2023-11-21

## 2023-11-21 RX ORDER — TESTOSTERONE CYPIONATE 200 MG/ML
200 INJECTION, SOLUTION INTRAMUSCULAR
Qty: 6 ML | Refills: 0 | Status: SHIPPED | OUTPATIENT
Start: 2023-11-21 | End: 2024-02-19

## 2023-11-21 RX ORDER — EMPAGLIFLOZIN 10 MG/1
10 TABLET, FILM COATED ORAL DAILY
Qty: 90 TABLET | Refills: 4 | Status: SHIPPED | OUTPATIENT
Start: 2023-11-21 | End: 2024-02-20

## 2023-11-21 RX ORDER — METFORMIN HYDROCHLORIDE 500 MG/1
500 TABLET, EXTENDED RELEASE ORAL 2 TIMES DAILY
Qty: 180 TABLET | Refills: 11 | Status: SHIPPED | OUTPATIENT
Start: 2023-11-21

## 2023-11-21 RX ORDER — HYDROCODONE BITARTRATE AND ACETAMINOPHEN 5; 325 MG/1; MG/1
1 TABLET ORAL 3 TIMES DAILY
COMMUNITY
Start: 2023-11-13

## 2023-11-21 RX ORDER — SILDENAFIL 100 MG/1
TABLET, FILM COATED ORAL
COMMUNITY
Start: 2023-10-06

## 2023-11-21 RX ORDER — TESTOSTERONE CYPIONATE 200 MG/ML
INJECTION, SOLUTION INTRAMUSCULAR
COMMUNITY
End: 2023-11-21

## 2023-11-21 RX ORDER — LANCETS 30 GAUGE
EACH MISCELLANEOUS
Qty: 100 EACH | Refills: 11 | Status: SHIPPED | OUTPATIENT
Start: 2023-11-21

## 2023-11-21 RX ORDER — EMPAGLIFLOZIN 10 MG/1
10 TABLET, FILM COATED ORAL DAILY
Qty: 28 TABLET | Refills: 0 | Status: SHIPPED | OUTPATIENT
Start: 2023-11-21 | End: 2024-02-20

## 2023-11-21 RX ORDER — ERGOCALCIFEROL 1.25 MG/1
50000 CAPSULE ORAL
Qty: 12 CAPSULE | Refills: 1 | Status: SHIPPED | OUTPATIENT
Start: 2023-11-21 | End: 2024-02-16 | Stop reason: SDUPTHER

## 2023-11-21 RX ORDER — INSULIN DEGLUDEC 200 U/ML
6 INJECTION, SOLUTION SUBCUTANEOUS
Qty: 3 ML | Refills: 5 | Status: SHIPPED | OUTPATIENT
Start: 2023-11-21

## 2023-11-21 RX ORDER — LEVOTHYROXINE SODIUM 0.15 MG/1
150 TABLET ORAL DAILY
Qty: 90 TABLET | Refills: 4 | Status: SHIPPED | OUTPATIENT
Start: 2023-11-21

## 2023-11-21 ASSESSMENT — FIBROSIS 4 INDEX: FIB4 SCORE: 3.18

## 2023-11-27 ENCOUNTER — TELEPHONE (OUTPATIENT)
Dept: ENDOCRINOLOGY | Facility: MEDICAL CENTER | Age: 73
End: 2023-11-27
Payer: MEDICARE

## 2023-11-28 NOTE — TELEPHONE ENCOUNTER
VOICEMAIL  1. Caller Name: Artem Joseph                      Call Back Number: [phone] (home)       2. Message: patient has a Jardiance Rx that says take once a day, there was also a rx [facility] for metphormin one by mouth twice a day. Does patient take them together or wait till the Jardience is out in 28 days

## 2023-12-06 ENCOUNTER — TELEPHONE (OUTPATIENT)
Dept: ENDOCRINOLOGY | Facility: MEDICAL CENTER | Age: 73
End: 2023-12-06
Payer: MEDICARE

## 2023-12-06 NOTE — TELEPHONE ENCOUNTER
Prior Authorization for FreeStyle Lite Test strips (Quantity: 400, Days: 100) has been submitted via Cover My Meds: Key ([id number])    Insurance: [employer]    Will follow up in 24-48 business hours.

## 2023-12-07 NOTE — TELEPHONE ENCOUNTER
Prior Authorization for FreeStyle Lite Test strips has been approved for a quantity of 400 , day supply 100    Insurance-[employer]    Prior Authorization Reference#-[id number]    Dates in effect, from 12/06/23 through 12/05/24    Co-pay: $150/300 strips/75D                $50/100 strips/25D    Pharmacy and phone number   [id number]  [facility] [address] [address]  Phone: [phone]    Fax: [phone]     Is patient eligible to fill with [facility]? No- patient lives in [place]    Next Steps:  Routing Approval to Liaisons

## 2023-12-14 NOTE — TELEPHONE ENCOUNTER
Called patient at [phone] to advise him of the PA approval on the FreeStyle Lite Test Strips. There was no answer and I left a general voice message requesting a call back.     Mar Doty  RX Coordinator/Liaison

## 2023-12-19 LAB
ALBUMIN SERPL-MCNC: 4.8 G/DL (ref 3.8–4.8)
ALBUMIN/CREAT UR: 27 MG/G CREAT (ref 0–29)
ALBUMIN/GLOB SERPL: 2.4 {RATIO} (ref 1.2–2.2)
ALP SERPL-CCNC: 134 IU/L (ref 44–121)
ALT SERPL-CCNC: 17 IU/L (ref 0–44)
AMBIG ABBREV CMP14 DFLT   977206: NORMAL
AMBIG ABBREV LP  977212: NORMAL
AST SERPL-CCNC: 22 IU/L (ref 0–40)
BILIRUB SERPL-MCNC: 0.3 MG/DL (ref 0–1.2)
BUN SERPL-MCNC: 21 MG/DL (ref 8–27)
BUN/CREAT SERPL: 21 (ref 10–24)
C PEPTIDE SERPL-MCNC: 1 NG/ML (ref 1.1–4.4)
CALCIUM SERPL-MCNC: 9.8 MG/DL (ref 8.6–10.2)
CHLORIDE SERPL-SCNC: 105 MMOL/L (ref 96–106)
CHOLEST SERPL-MCNC: 147 MG/DL (ref 100–199)
CO2 SERPL-SCNC: 25 MMOL/L (ref 20–29)
CREAT SERPL-MCNC: 1.02 MG/DL (ref 0.76–1.27)
CREAT UR-MCNC: 83.4 MG/DL
EGFRCR SERPLBLD CKD-EPI 2021: 78 ML/MIN/1.73
GAD65 AB SER IA-ACNC: <5 U/ML (ref 0–5)
GLOBULIN SER CALC-MCNC: 2 G/DL (ref 1.5–4.5)
GLUCOSE SERPL-MCNC: 130 MG/DL (ref 70–99)
HCT VFR BLD AUTO: 52.1 % (ref 37.5–51)
HDLC SERPL-MCNC: 67 MG/DL
HGB BLD-MCNC: 17.6 G/DL (ref 13–17.7)
ISLET CELL512 AB SER-ACNC: <7.5 U/ML
LABORATORY COMMENT REPORT: NORMAL
LDLC SERPL CALC-MCNC: 60 MG/DL (ref 0–99)
MICROALBUMIN UR-MCNC: 22.7 UG/ML
POTASSIUM SERPL-SCNC: 4.1 MMOL/L (ref 3.5–5.2)
PROT SERPL-MCNC: 6.8 G/DL (ref 6–8.5)
SHBG SERPL-SCNC: 30.8 NMOL/L (ref 19.3–76.4)
SODIUM SERPL-SCNC: 146 MMOL/L (ref 134–144)
T4 FREE SERPL-MCNC: 1.72 NG/DL (ref 0.82–1.77)
TESTOST FREE SERPL-MCNC: 8.2 PG/ML (ref 6.6–18.1)
TESTOST SERPL-MCNC: 765 NG/DL (ref 264–916)
TRIGL SERPL-MCNC: 113 MG/DL (ref 0–149)
TSH SERPL DL<=0.005 MIU/L-ACNC: 2.35 UIU/ML (ref 0.45–4.5)
VLDLC SERPL CALC-MCNC: 20 MG/DL (ref 5–40)

## 2023-12-28 ENCOUNTER — OFFICE VISIT (OUTPATIENT)
Dept: ENDOCRINOLOGY | Facility: MEDICAL CENTER | Age: 73
End: 2023-12-28
Payer: MEDICARE

## 2023-12-28 VITALS
SYSTOLIC BLOOD PRESSURE: 128 MMHG | DIASTOLIC BLOOD PRESSURE: 72 MMHG | OXYGEN SATURATION: 95 % | BODY MASS INDEX: 27.27 KG/M2 | HEART RATE: 83 BPM | HEIGHT: 72 IN | WEIGHT: 201.3 LBS

## 2023-12-28 DIAGNOSIS — E78.5 DYSLIPIDEMIA: ICD-10-CM

## 2023-12-28 DIAGNOSIS — D75.1 ERYTHROCYTOSIS: ICD-10-CM

## 2023-12-28 DIAGNOSIS — E55.9 VITAMIN D DEFICIENCY: ICD-10-CM

## 2023-12-28 DIAGNOSIS — Z79.4 TYPE 2 DIABETES MELLITUS WITHOUT COMPLICATION, WITH LONG-TERM CURRENT USE OF INSULIN (HCC): ICD-10-CM

## 2023-12-28 DIAGNOSIS — E03.9 HYPOTHYROIDISM, ACQUIRED: ICD-10-CM

## 2023-12-28 DIAGNOSIS — T38.0X5A STEROID-INDUCED OSTEOPENIA: ICD-10-CM

## 2023-12-28 DIAGNOSIS — M85.80 STEROID-INDUCED OSTEOPENIA: ICD-10-CM

## 2023-12-28 DIAGNOSIS — E11.9 TYPE 2 DIABETES MELLITUS WITHOUT COMPLICATION, WITH LONG-TERM CURRENT USE OF INSULIN (HCC): ICD-10-CM

## 2023-12-28 DIAGNOSIS — E27.40 ADRENAL INSUFFICIENCY (HCC): ICD-10-CM

## 2023-12-28 DIAGNOSIS — E29.1 PRIMARY TESTICULAR HYPOGONADISM: ICD-10-CM

## 2023-12-28 PROCEDURE — 3074F SYST BP LT 130 MM HG: CPT

## 2023-12-28 PROCEDURE — 99214 OFFICE O/P EST MOD 30 MIN: CPT

## 2023-12-28 PROCEDURE — 99212 OFFICE O/P EST SF 10 MIN: CPT

## 2023-12-28 PROCEDURE — 3078F DIAST BP <80 MM HG: CPT

## 2023-12-28 RX ORDER — NEEDLES, FILTER 19GX1 1/2"
23 NEEDLE, DISPOSABLE MISCELLANEOUS
Qty: 30 EACH | Refills: 11 | Status: SHIPPED | OUTPATIENT
Start: 2023-12-28

## 2023-12-28 ASSESSMENT — FIBROSIS 4 INDEX: FIB4 SCORE: 3.4

## 2023-12-28 NOTE — PROGRESS NOTES
HPI: Maykel is a 72 year old male here to establish care for the following issues       1. Primary testicular hypogonadism  Well documented low testosterone and elevated LH levels  Testosterone is helpful with normal libido, and energy levels  Recent proctectomy due to enlarge prostate-neg for cancer     Current testosterone replacement-testosterone cypionate 1mls every 14 days  Does his blood work in between injections   Denies fatigue, muscle, brain fogginess, weight loss      Latest Reference Range & Units 12/14/23 08:24   Free Testosterone 6.6 - 18.1 pg/mL 8.2        Latest Reference Range & Units 12/14/23 08:24   Testosterone,Total 264 - 916 ng/dL 765      Latest Reference Range & Units 12/14/23 08:24   Sex Horm Binding Glob, Serum 19.3 - 76.4 nmol/L 30.8           FV [facility] for Flomax appt with urologist next month         2. Erythrocytosis:   Followed by PCP and pulmonology  Normal testosterone levels  History of COPD with nocturnal oxygen-2L   Sleep apnea has been ruled out     Latest Reference Range & Units 12/14/23 08:24   Hemoglobin 13.0 - 17.7 g/dL 17.6   Hematocrit 37.5 - 51.0 % 52.1 (H)         3. Hypothyroidism, acquired:   Clinically euthyroid taking levothyroxine 150 mcg/day.    Taking his thyroid hormone replacement prior to breakfast, on an empty stomach  Denies taking iron, calcium, antiacids   Denies taking biotin or multivatims     Denies palpitation, tremors, fatigue, muscle, brain fogginess, weight loss      Latest Reference Range & Units 12/14/23 08:24   TSH 0.450 - 4.500 uIU/mL 2.350   Free T-4 0.82 - 1.77 ng/dL 1.72         4. Adrenal insufficiency (HCC)  Diagnosed condition by Dr. Manrique, possibly related to previous metastatic melanoma for which he took, steroids for more than 6 months  Currently taking hydrocortisone 20 mg in the morning and 20 mg in the afternoon  He takes an extra 10 mg dose for the stress  Denies cushingoid symptoms     Denies fatigue, muscle weakness,  brain fogginess, weight loss              5.  Steroid-induced osteopenia:  The presumptive diagnosis based on  #4 above.      Bone density scan on 10/21/2022 showed  A lumbar T score of -0.8, a right femur T score of -2.2            6.  Vitamin D deficiency:  Currently taking ergocalciferol 1.25 mg weekly             7. Type 2 diabetes mellitus without complication, with long term use of insulin:   Patient was diagnosed with type 2 diabetes on last appointment    POC A1c on 11/8/2023 at 14%      Reports polydipsia, polyphagia, polyuria       Medication regimen:  Metformin extended release 500 mg twice a day  Jardiance 10 mg daily  Tresiba 6 units at night               Latest Reference Range & Units 12/14/23 08:24   C-Peptide 1.1 - 4.4 ng/mL 1.0 (L)      Latest Reference Range & Units 12/14/23 08:24   IA-2 Autoantibodies U/mL <7.5      Latest Reference Range & Units 12/14/23 08:24   eGFR >59 mL/min/1.73 78      Latest Reference Range & Units 12/14/23 08:24   Feroz-65 -Glutamic Acid Decarbox 0.0 - 5.0 U/mL <5.0     Mild proteinuria   Latest Reference Range & Units 12/14/23 08:24   Creatinine, Random Urine Not Estab. mg/dL 83.4   Microalbumin, Urine Random Not Estab. ug/mL 22.7   Microalbumin-Creatinine 0 - 29 mg/g creat 27       8. Dyslipidemia:  Simvastatin 20 mg  Denies SE    Latest Reference Range & Units 12/14/23 08:24   Cholesterol,Tot 100 - 199 mg/dL 147   Triglycerides 0 - 149 mg/dL 113   HDL >39 mg/dL 67   LDL Chol Calc (NIH) 0 - 99 mg/dL 60               Allergies:   Allergies   Allergen Reactions    Sulfa Drugs      Severe muscle pains       Current medicines including changes today:  Current Outpatient Medications   Medication Sig Dispense Refill    HYDROcodone-acetaminophen (NORCO) 5-325 MG Tab per tablet Take 1 Tablet by mouth 3 times a day.      sildenafil citrate (VIAGRA) 100 MG tablet 0.5-1 tab po prn, 45 minutes prior to desired erection and on empty stomach      levothyroxine (SYNTHROID) 150 MCG Tab  "Take 1 Tablet by mouth every day. 90 Tablet 4    vitamin D2, Ergocalciferol, (DRISDOL) 1.25 MG (84793 UT) Cap capsule Take 1 Capsule by mouth every 7 days. 12 Capsule 1    metFORMIN ER (GLUCOPHAGE XR) 500 MG TABLET SR 24 HR Take 1 Tablet by mouth 2 times a day. 180 Tablet 11    Empagliflozin (JARDIANCE) 10 MG Tab tablet Take 1 Tablet by mouth every day. 90 Tablet 4    Blood Glucose Monitoring Suppl Device Insurance preference 1 Each 1    Blood Glucose Test Strips Test strips order: Test strips for Insurance preference meter. Sig: use 4 and prn ssx high or low sugar #100 RF x 3 400 Strip 11    Lancets Lancets order: Lancets for Insurance preference meter. Sig: use 4 and prn ssx high or low sugar. #100 RF x 3 100 Each 11    Empagliflozin (JARDIANCE) 10 MG Tab tablet Take 1 Tablet by mouth every day. 28 Tablet 0    Insulin Degludec (TRESIBA FLEXTOUCH) 200 UNIT/ML Solution Pen-injector Inject 6 Units under the skin at bedtime 3 mL 5    BD PEN NEEDLE SHAWN U/F For use with 2-3 daily insulin and or GLP-1 injection. 400 Each 3    testosterone cypionate (DEPO-TESTOSTERONE) 200 MG/ML injection Inject 1 mL into the shoulder, thigh, or buttocks every 14 days for 90 days. 6 mL 0    fluticasone-umeclidinium-vilanterol (TRELEGY ELLIPTA) 100-62.5-25 mcg/act inhaler Inhale 1 Puff every day. 3 month supply x 1 year 90 Each 3    albuterol 108 (90 Base) MCG/ACT Aero Soln inhalation aerosol Inhale 2 Puffs every 6 hours as needed for Shortness of Breath. 1 Each 11    SYRINGE-NEEDLE, DISP, 3 ML (B-D INTEGRA SYRINGE) 23G X 1\" 3 ML Misc 23 g every 14 days. 30 Each 11    rivaroxaban (XARELTO) 20 MG Tab tablet Take 1 Tablet by mouth with dinner. 90 Tablet 3    DILTIAZem CD (CARDIZEM CD) 120 MG CAPSULE SR 24 HR Take 1 Capsule by mouth every day. 90 Capsule 3    flecainide (TAMBOCOR) 100 MG Tab Take 1 Tablet by mouth 2 times a day. 180 Tablet 3    felodipine ER (PLENDIL) 2.5 MG TABLET SR 24 HR Take 1 Tablet by mouth every day. 90 Tablet 3    " simvastatin (ZOCOR) 20 MG Tab Take 1 Tablet by mouth every morning. 90 Tablet 3    hydrocortisone (CORTEF) 10 MG Tab Take 1 Tablet by mouth 4 times a day. 336 Tablet 4    acetaminophen (TYLENOL) 325 MG Tab acetaminophen 325 mg tablet   take 2 tablets by mouth three times a day if needed for pain for 5 days      omeprazole (PRILOSEC) 20 MG CPDR Take 20 mg by mouth every day.       No current facility-administered medications for this visit.        Past Medical History:   Diagnosis Date    Adrenal disorder (HCC)     Arrhythmia     Breath shortness     Cancer (HCC)     melanoma in 2007, original 1989 from back of neck (chemo)    Cataract     Emphysema of lung (HCC)     Heart burn     Hypertension     Indigestion     Metastatic melanoma (HCC) 1990 / 2012    original neck lesion resected 1990 / recurrent lesion resected 2007 / third lesion resected 2012 /recurrence in 2016    Thyroid disease        PHYSICAL EXAM:    /72 (BP Location: Left arm, Patient Position: Sitting, BP Cuff Size: Adult)   Pulse 83   Ht 1.829 m (6')   Wt 91.3 kg (201 lb 4.8 oz)   SpO2 95%   BMI 27.30 kg/m²     Gen. overweight but otherwise appears healthy             No cushingoid features        ASSESSMENT AND RECOMMENDATIONS  1. Primary testicular hypogonadism  Clinically stable  Biochemically stable  Continue regimen see HPI      2. Erythrocytosis  Unstable  Underlying sleep apnea  Follow by PCP and pulmonology     3. Hypothyroidism, acquired  - Clinically stable  - Biochemically stable  - Continue regimen-HPI       4. Adrenal insufficiency (HCC)  Clinically Stable  Patient understands about her stress dose  Continue regimen-see HPI    5. Steroid-induced osteopenia  Stable  Continue regimen-HPI    6. Vitamin D deficiency  Unstable  Ergocalciferol 1.25 mg weekly sent to pharmacy for a 6-month supply    7. Type 2 diabetes mellitus without complication, with long-term current use of insulin (HCC)  Patient reported history of prediabetes on  his last appointment  An A1c was ordered on his blood work  A1c showed a 14.4% in blood work  Patient reports symptoms of polydipsia, polyphagia, polyuria  Discuss DM, risk of not treating DM      Diet: Pt stopped all his sweets and replaced it with a toast and honey,   Exercise:     BG in the am around 100-140s    Medication regimen:-no changes on medication today   Metformin  mg twice a day, SE discussed  Jardiance 10mg daily sent - discussed to drink plenty of water  Tresiba 6 untis/night     8. Dyslipidemia:  - Stable  -Continue regimen-see HPI    Disposition: Make an appointment to follow-up in after Feb 8th   Do your blood work 2 weeks before your next appointment        DEYANIRA HermanR.N.  12/28/23

## 2024-02-16 ENCOUNTER — TELEPHONE (OUTPATIENT)
Dept: ENDOCRINOLOGY | Facility: MEDICAL CENTER | Age: 74
End: 2024-02-16
Payer: MEDICARE

## 2024-02-16 DIAGNOSIS — E55.9 VITAMIN D DEFICIENCY: ICD-10-CM

## 2024-02-16 LAB
25(OH)D3+25(OH)D2 SERPL-MCNC: 47.7 NG/ML (ref 30–100)
ALBUMIN SERPL-MCNC: 4.8 G/DL (ref 3.8–4.8)
ALBUMIN/GLOB SERPL: 2.2 {RATIO} (ref 1.2–2.2)
ALP SERPL-CCNC: 134 IU/L (ref 44–121)
ALT SERPL-CCNC: 20 IU/L (ref 0–44)
AMBIG ABBREV CMP14 DFLT   977206: NORMAL
AST SERPL-CCNC: 23 IU/L (ref 0–40)
BILIRUB SERPL-MCNC: 0.5 MG/DL (ref 0–1.2)
BUN SERPL-MCNC: 18 MG/DL (ref 8–27)
BUN/CREAT SERPL: 18 (ref 10–24)
CALCIUM SERPL-MCNC: 9.8 MG/DL (ref 8.6–10.2)
CHLORIDE SERPL-SCNC: 105 MMOL/L (ref 96–106)
CO2 SERPL-SCNC: 24 MMOL/L (ref 20–29)
CREAT SERPL-MCNC: 0.99 MG/DL (ref 0.76–1.27)
EGFRCR SERPLBLD CKD-EPI 2021: 81 ML/MIN/1.73
GLOBULIN SER CALC-MCNC: 2.2 G/DL (ref 1.5–4.5)
GLUCOSE SERPL-MCNC: 106 MG/DL (ref 70–99)
HCT VFR BLD AUTO: 54.9 % (ref 37.5–51)
HGB BLD-MCNC: 18 G/DL (ref 13–17.7)
POTASSIUM SERPL-SCNC: 4.4 MMOL/L (ref 3.5–5.2)
PROT SERPL-MCNC: 7 G/DL (ref 6–8.5)
SHBG SERPL-SCNC: 28.2 NMOL/L (ref 19.3–76.4)
SODIUM SERPL-SCNC: 142 MMOL/L (ref 134–144)
T4 FREE SERPL-MCNC: 1.82 NG/DL (ref 0.82–1.77)
TESTOST FREE SERPL-MCNC: 3.8 PG/ML (ref 6.6–18.1)
TESTOST SERPL-MCNC: 373 NG/DL (ref 264–916)
TSH SERPL DL<=0.005 MIU/L-ACNC: 2.15 UIU/ML (ref 0.45–4.5)

## 2024-02-16 RX ORDER — ERGOCALCIFEROL 1.25 MG/1
50000 CAPSULE ORAL
Qty: 12 CAPSULE | Refills: 1 | Status: SHIPPED | OUTPATIENT
Start: 2024-02-16

## 2024-02-16 NOTE — TELEPHONE ENCOUNTER
Patient called in stating that he needs to update his pharmacy to [facility] in [place], [place]. No further action needed.

## 2024-02-16 NOTE — TELEPHONE ENCOUNTER
Received request via: Patient    Was the patient seen in the last year in this department? Yes    Does the patient have an active prescription (recently filled or refills available) for medication(s) requested? No    [facility]    Does the patient have alf Plus and need 100 day supply (blood pressure, diabetes and cholesterol meds only)? Patient does not have SCP

## 2024-02-20 ENCOUNTER — OFFICE VISIT (OUTPATIENT)
Dept: ENDOCRINOLOGY | Facility: MEDICAL CENTER | Age: 74
End: 2024-02-20
Payer: MEDICARE

## 2024-02-20 VITALS
BODY MASS INDEX: 29.12 KG/M2 | DIASTOLIC BLOOD PRESSURE: 78 MMHG | HEART RATE: 89 BPM | WEIGHT: 215 LBS | SYSTOLIC BLOOD PRESSURE: 130 MMHG | OXYGEN SATURATION: 92 % | HEIGHT: 72 IN

## 2024-02-20 DIAGNOSIS — E27.40 ADRENAL INSUFFICIENCY (HCC): ICD-10-CM

## 2024-02-20 DIAGNOSIS — E29.1 PRIMARY TESTICULAR HYPOGONADISM: ICD-10-CM

## 2024-02-20 DIAGNOSIS — D75.1 ERYTHROCYTOSIS: ICD-10-CM

## 2024-02-20 DIAGNOSIS — E78.5 DYSLIPIDEMIA: ICD-10-CM

## 2024-02-20 DIAGNOSIS — T38.0X5A STEROID-INDUCED OSTEOPENIA: ICD-10-CM

## 2024-02-20 DIAGNOSIS — Z79.4 TYPE 2 DIABETES MELLITUS WITHOUT COMPLICATION, WITH LONG-TERM CURRENT USE OF INSULIN (HCC): ICD-10-CM

## 2024-02-20 DIAGNOSIS — E11.9 TYPE 2 DIABETES MELLITUS WITHOUT COMPLICATION, WITH LONG-TERM CURRENT USE OF INSULIN (HCC): ICD-10-CM

## 2024-02-20 DIAGNOSIS — E55.9 VITAMIN D DEFICIENCY: ICD-10-CM

## 2024-02-20 DIAGNOSIS — M85.80 STEROID-INDUCED OSTEOPENIA: ICD-10-CM

## 2024-02-20 DIAGNOSIS — E03.9 HYPOTHYROIDISM, ACQUIRED: ICD-10-CM

## 2024-02-20 LAB
HBA1C MFR BLD: 8.4 % (ref ?–5.8)
POCT INT CON NEG: NEGATIVE
POCT INT CON POS: POSITIVE

## 2024-02-20 PROCEDURE — 3078F DIAST BP <80 MM HG: CPT

## 2024-02-20 PROCEDURE — 83036 HEMOGLOBIN GLYCOSYLATED A1C: CPT

## 2024-02-20 PROCEDURE — 3075F SYST BP GE 130 - 139MM HG: CPT

## 2024-02-20 PROCEDURE — 99214 OFFICE O/P EST MOD 30 MIN: CPT

## 2024-02-20 PROCEDURE — 99212 OFFICE O/P EST SF 10 MIN: CPT

## 2024-02-20 RX ORDER — TIRZEPATIDE 2.5 MG/.5ML
2.5 INJECTION, SOLUTION SUBCUTANEOUS
Qty: 6 ML | Refills: 11 | Status: SHIPPED | OUTPATIENT
Start: 2024-02-20

## 2024-02-20 RX ORDER — EMPAGLIFLOZIN 25 MG/1
1 TABLET, FILM COATED ORAL DAILY
Qty: 30 TABLET | Refills: 3 | COMMUNITY
Start: 2024-02-20

## 2024-02-20 ASSESSMENT — FIBROSIS 4 INDEX: FIB4 SCORE: 3.28

## 2024-02-20 NOTE — PROGRESS NOTES
HPI: Maykel is a 73 year old male here to establish care for the following issues       1. Primary testicular hypogonadism  Well documented low testosterone and elevated LH levels  Testosterone is helpful with normal libido, and energy levels  Recent proctectomy due to enlarge prostate-neg for cancer     Current testosterone replacement-testosterone cypionate 1mls every 14 days  Does his blood work in between injections   Denies fatigue, muscle, brain fogginess, weight loss          Latest Reference Range & Units 02/07/24 08:06   Hemoglobin 13.0 - 17.7 g/dL 18.0 (H)   Hematocrit 37.5 - 51.0 % 54.9 (H)      Latest Reference Range & Units 02/07/24 08:06   Free Testosterone 6.6 - 18.1 pg/mL 3.8 (L)   Testosterone,Total 264 - 916 ng/dL 373   Sex Horm Binding Glob, Serum 19.3 - 76.4 nmol/L 28.2     FV by [facility] for Flomax appt with urologist next month         2. Erythrocytosis:   Followed by PCP and pulmonology  Normal testosterone levels  History of COPD with nocturnal oxygen-2L   Sleep apnea has been ruled out     Latest Reference Range & Units 12/14/23 08:24   Hemoglobin 13.0 - 17.7 g/dL 17.6   Hematocrit 37.5 - 51.0 % 52.1 (H)         3. Hypothyroidism, acquired:   Clinically euthyroid taking levothyroxine 150 mcg/day.    Taking his thyroid hormone replacement prior to breakfast, on an empty stomach  Denies taking iron, calcium, antiacids   Denies taking biotin or multivatims     Denies palpitation, tremors, fatigue, muscle, brain fogginess, weight loss      Latest Reference Range & Units 02/07/24 08:06   TSH 0.450 - 4.500 uIU/mL 2.150   Free T-4 0.82 - 1.77 ng/dL 1.82 (H)     4. Adrenal insufficiency (HCC)  Diagnosed condition by Dr. Manrique, possibly related to previous metastatic melanoma for which he took, steroids for more than 6 months  Currently taking hydrocortisone 20 mg in the morning and 20 mg in the afternoon  He takes an extra 10 mg dose for the stress  Denies cushingoid symptoms     Denies  fatigue, muscle weakness, brain fogginess, weight loss              5.  Steroid-induced osteopenia:  The presumptive diagnosis based on  #4 above.      Bone density scan on 10/21/2022 showed  A lumbar T score of -0.8, a right femur T score of -2.2            6.  Vitamin D deficiency:  Currently taking ergocalciferol 1.25 mg weekly      Latest Reference Range & Units 02/07/24 08:06   25-Hydroxy   Vitamin D 25 30.0 - 100.0 ng/mL 47.7           7. Type 2 diabetes mellitus without complication, with long term use of insulin:   Patient was diagnosed with type 2 diabetes on last appointment    POC A1c on 2/20/2024 at 8.4%  POC A1c on 11/8/2023 at 14%      Reports polydipsia, polyphagia, polyuria       Medication regimen:  Metformin extended release 500 mg twice a day  Jardiance 10 mg daily  Tresiba 6 units at night               Latest Reference Range & Units 12/14/23 08:24   C-Peptide 1.1 - 4.4 ng/mL 1.0 (L)      Latest Reference Range & Units 12/14/23 08:24   IA-2 Autoantibodies U/mL <7.5      Latest Reference Range & Units 02/07/24 08:06   eGFR >59 mL/min/1.73 81      Latest Reference Range & Units 12/14/23 08:24   Feroz-65 -Glutamic Acid Decarbox 0.0 - 5.0 U/mL <5.0     Mild proteinuria   Latest Reference Range & Units 12/14/23 08:24   Creatinine, Random Urine Not Estab. mg/dL 83.4   Microalbumin, Urine Random Not Estab. ug/mL 22.7   Microalbumin-Creatinine 0 - 29 mg/g creat 27       8. Dyslipidemia:  Simvastatin 20 mg  Denies SE    Latest Reference Range & Units 12/14/23 08:24   Cholesterol,Tot 100 - 199 mg/dL 147   Triglycerides 0 - 149 mg/dL 113   HDL >39 mg/dL 67   LDL Chol Calc (NIH) 0 - 99 mg/dL 60   VLDL Cholesterol Calc 5 - 40 mg/dL 20               Allergies:   Allergies   Allergen Reactions    Sulfa Drugs      Severe muscle pains       Current medicines including changes today:  Current Outpatient Medications   Medication Sig Dispense Refill    vitamin D2, Ergocalciferol, (DRISDOL) 1.25 MG (20916 UT) Cap  "capsule Take 1 Capsule by mouth every 7 days. 12 Capsule 1    SYRINGE-NEEDLE, DISP, 3 ML (B-D INTEGRA SYRINGE) 23G X 1\" 3 ML Misc 23 g every 14 days. 30 Each 11    HYDROcodone-acetaminophen (NORCO) 5-325 MG Tab per tablet Take 1 Tablet by mouth 3 times a day.      sildenafil citrate (VIAGRA) 100 MG tablet 0.5-1 tab po prn, 45 minutes prior to desired erection and on empty stomach      levothyroxine (SYNTHROID) 150 MCG Tab Take 1 Tablet by mouth every day. 90 Tablet 4    metFORMIN ER (GLUCOPHAGE XR) 500 MG TABLET SR 24 HR Take 1 Tablet by mouth 2 times a day. 180 Tablet 11    Empagliflozin (JARDIANCE) 10 MG Tab tablet Take 1 Tablet by mouth every day. 90 Tablet 4    Blood Glucose Monitoring Suppl Device Insurance preference 1 Each 1    Blood Glucose Test Strips Test strips order: Test strips for Insurance preference meter. Sig: use 4 and prn ssx high or low sugar #100 RF x 3 400 Strip 11    Lancets Lancets order: Lancets for Insurance preference meter. Sig: use 4 and prn ssx high or low sugar. #100 RF x 3 100 Each 11    Empagliflozin (JARDIANCE) 10 MG Tab tablet Take 1 Tablet by mouth every day. 28 Tablet 0    Insulin Degludec (TRESIBA FLEXTOUCH) 200 UNIT/ML Solution Pen-injector Inject 6 Units under the skin at bedtime 3 mL 5    BD PEN NEEDLE SHAWN U/F For use with 2-3 daily insulin and or GLP-1 injection. 400 Each 3    fluticasone-umeclidinium-vilanterol (TRELEGY ELLIPTA) 100-62.5-25 mcg/act inhaler Inhale 1 Puff every day. 3 month supply x 1 year 90 Each 3    albuterol 108 (90 Base) MCG/ACT Aero Soln inhalation aerosol Inhale 2 Puffs every 6 hours as needed for Shortness of Breath. 1 Each 11    rivaroxaban (XARELTO) 20 MG Tab tablet Take 1 Tablet by mouth with dinner. 90 Tablet 3    DILTIAZem CD (CARDIZEM CD) 120 MG CAPSULE SR 24 HR Take 1 Capsule by mouth every day. 90 Capsule 3    flecainide (TAMBOCOR) 100 MG Tab Take 1 Tablet by mouth 2 times a day. 180 Tablet 3    felodipine ER (PLENDIL) 2.5 MG TABLET SR 24 HR " Take 1 Tablet by mouth every day. 90 Tablet 3    simvastatin (ZOCOR) 20 MG Tab Take 1 Tablet by mouth every morning. 90 Tablet 3    hydrocortisone (CORTEF) 10 MG Tab Take 1 Tablet by mouth 4 times a day. 336 Tablet 4    acetaminophen (TYLENOL) 325 MG Tab acetaminophen 325 mg tablet   take 2 tablets by mouth three times a day if needed for pain for 5 days      omeprazole (PRILOSEC) 20 MG CPDR Take 20 mg by mouth every day.       No current facility-administered medications for this visit.        Past Medical History:   Diagnosis Date    Adrenal disorder (HCC)     Arrhythmia     Breath shortness     Cancer (HCC)     melanoma in 2007, original 1989 from back of neck (chemo)    Cataract     Emphysema of lung (HCC)     Heart burn     Hypertension     Indigestion     Metastatic melanoma (HCC) 1990 / 2012    original neck lesion resected 1990 / recurrent lesion resected 2007 / third lesion resected 2012 /recurrence in 2016    Thyroid disease        PHYSICAL EXAM:    /78 (BP Location: Left arm, Patient Position: Sitting, BP Cuff Size: Adult)   Pulse 89   Ht 1.829 m (6')   Wt 97.5 kg (215 lb)   SpO2 92%   BMI 29.16 kg/m²     Gen. overweight but otherwise appears healthy             No cushingoid features        ASSESSMENT AND RECOMMENDATIONS  1. Primary testicular hypogonadism  Clinically stable  Biochemically stable  Continue regimen see HPI      2. Erythrocytosis  Unstable  Underlying sleep apnea  Follow by PCP and pulmonology     3. Hypothyroidism, acquired  - Clinically stable  - Biochemically stable  - Continue regimen-HPI       4. Adrenal insufficiency (HCC)  Clinically Stable  Patient understands about her stress dose  Medication titrated as follow: Hydrocortisone 20mg in the am and 10mg around 1-2 pm     5. Steroid-induced osteopenia  Stable  Continue regimen-HPI    6. Vitamin D deficiency  - Stable  -Continue regimen-see HPI-once Rx rans out then take 4000IUs/day of vit D3    7. Type 2 diabetes mellitus  without complication, with long-term current use of insulin (HCC)  Unstable but improving with an A1c at 8.4%  - Exercise for least 150 minutes/week  - Stable hydration  - Daily feet check  - Maintain a healthy diet, minimal carbohydrate, portion control       Medication regimen:  Metformin extended release 500 mg twice a day-continue   Jardiance 10 mg daily-increased to 25mg daily, stay well hydrated   Tresiba 6 units at night-continue   Added mounjaro 2.5 mg weekly        Diet: Pt stopped all his sweets and replaced it with a toast and honey,   Exercise:     BG in the am around 100-140s    Medication regimen:-no changes on medication today   Metformin  mg twice a day, SE discussed  Jardiance 10mg daily sent - discussed to drink plenty of water  Tresiba 6 untis/night     8. Dyslipidemia:  - Stable  -Continue regimen-see HPI    Disposition: Make an appointment to follow-up in 5 months  Do your blood work 2 weeks before your next appointment        DEYANIRA HermanRLuizN.  02/20/24

## 2024-02-21 ENCOUNTER — TELEPHONE (OUTPATIENT)
Dept: ENDOCRINOLOGY | Facility: MEDICAL CENTER | Age: 74
End: 2024-02-21
Payer: MEDICARE

## 2024-02-21 NOTE — TELEPHONE ENCOUNTER
Received PA request via [employer] for Mounjaro 2.5MG/0.5ML pen-injectors.    $20-2mls/28D  $60-6mlst/84D    Insurance:[facility]     Pharmacy on File  [id number] - [address] AT [facility] & [address] [address]  Phone: [phone]  Fax: [phone]    Is patient eligible to fill with [facility]? No patient lives in [place] out of shipping area

## 2024-05-09 ENCOUNTER — TELEPHONE (OUTPATIENT)
Dept: ENDOCRINOLOGY | Facility: MEDICAL CENTER | Age: 74
End: 2024-05-09
Payer: MEDICARE

## 2024-05-09 NOTE — TELEPHONE ENCOUNTER
Pt stopped in and is needing refill of hydrocortisone refilled to the [facility] in [place].

## 2024-05-30 DIAGNOSIS — Z79.899 HIGH RISK MEDICATION USE: ICD-10-CM

## 2024-05-30 DIAGNOSIS — I48.0 PAROXYSMAL ATRIAL FIBRILLATION (HCC): ICD-10-CM

## 2024-05-30 RX ORDER — FLECAINIDE ACETATE 100 MG/1
100 TABLET ORAL 2 TIMES DAILY
Qty: 180 TABLET | Refills: 0 | Status: SHIPPED | OUTPATIENT
Start: 2024-05-30

## 2024-07-09 ENCOUNTER — TELEPHONE (OUTPATIENT)
Dept: PHARMACY | Facility: MEDICAL CENTER | Age: 74
End: 2024-07-09
Payer: MEDICARE

## 2024-07-09 DIAGNOSIS — Z79.899 HIGH RISK MEDICATION USE: ICD-10-CM

## 2024-07-09 DIAGNOSIS — I48.0 PAROXYSMAL ATRIAL FIBRILLATION (HCC): ICD-10-CM

## 2024-07-09 DIAGNOSIS — J44.9 CHRONIC OBSTRUCTIVE PULMONARY DISEASE, UNSPECIFIED COPD TYPE (HCC): ICD-10-CM

## 2024-07-09 RX ORDER — FLUTICASONE FUROATE, UMECLIDINIUM BROMIDE AND VILANTEROL TRIFENATATE 100; 62.5; 25 UG/1; UG/1; UG/1
POWDER RESPIRATORY (INHALATION)
Qty: 180 EACH | Refills: 0 | Status: SHIPPED | OUTPATIENT
Start: 2024-07-09

## 2024-07-10 ENCOUNTER — TELEPHONE (OUTPATIENT)
Dept: ENDOCRINOLOGY | Facility: MEDICAL CENTER | Age: 74
End: 2024-07-10
Payer: MEDICARE

## 2024-07-10 RX ORDER — RIVAROXABAN 20 MG/1
20 TABLET, FILM COATED ORAL
Qty: 90 TABLET | Refills: 0 | Status: SHIPPED | OUTPATIENT
Start: 2024-07-10 | End: 2024-07-31 | Stop reason: SDUPTHER

## 2024-07-17 ENCOUNTER — APPOINTMENT (OUTPATIENT)
Dept: CARDIOLOGY | Facility: MEDICAL CENTER | Age: 74
End: 2024-07-17
Payer: MEDICARE

## 2024-07-18 ENCOUNTER — OFFICE VISIT (OUTPATIENT)
Dept: ENDOCRINOLOGY | Facility: MEDICAL CENTER | Age: 74
End: 2024-07-18
Attending: STUDENT IN AN ORGANIZED HEALTH CARE EDUCATION/TRAINING PROGRAM
Payer: MEDICARE

## 2024-07-18 VITALS
DIASTOLIC BLOOD PRESSURE: 72 MMHG | BODY MASS INDEX: 28.31 KG/M2 | HEIGHT: 72 IN | OXYGEN SATURATION: 93 % | WEIGHT: 209 LBS | HEART RATE: 83 BPM | SYSTOLIC BLOOD PRESSURE: 138 MMHG

## 2024-07-18 DIAGNOSIS — E78.5 DYSLIPIDEMIA: ICD-10-CM

## 2024-07-18 DIAGNOSIS — S32.000S COMPRESSION FRACTURE OF LUMBAR VERTEBRA, UNSPECIFIED LUMBAR VERTEBRAL LEVEL, SEQUELA: ICD-10-CM

## 2024-07-18 DIAGNOSIS — E03.9 HYPOTHYROIDISM, ACQUIRED: ICD-10-CM

## 2024-07-18 DIAGNOSIS — E27.40 ADRENAL INSUFFICIENCY (HCC): ICD-10-CM

## 2024-07-18 DIAGNOSIS — M81.8 STEROID-INDUCED OSTEOPOROSIS: ICD-10-CM

## 2024-07-18 DIAGNOSIS — T38.0X5A STEROID-INDUCED OSTEOPOROSIS: ICD-10-CM

## 2024-07-18 DIAGNOSIS — L84 FOOT CALLUS: ICD-10-CM

## 2024-07-18 DIAGNOSIS — Z79.4 TYPE 2 DIABETES MELLITUS WITHOUT COMPLICATION, WITH LONG-TERM CURRENT USE OF INSULIN (HCC): ICD-10-CM

## 2024-07-18 DIAGNOSIS — E29.1 PRIMARY TESTICULAR HYPOGONADISM: ICD-10-CM

## 2024-07-18 DIAGNOSIS — E27.1 ADRENAL INSUFFICIENCY (ADDISON'S DISEASE) (HCC): ICD-10-CM

## 2024-07-18 DIAGNOSIS — E11.9 TYPE 2 DIABETES MELLITUS WITHOUT COMPLICATION, WITH LONG-TERM CURRENT USE OF INSULIN (HCC): ICD-10-CM

## 2024-07-18 DIAGNOSIS — D75.1 POLYCYTHEMIA: ICD-10-CM

## 2024-07-18 LAB
HBA1C MFR BLD: 5.8 % (ref ?–5.8)
POCT INT CON NEG: NEGATIVE
POCT INT CON POS: POSITIVE

## 2024-07-18 PROCEDURE — 99213 OFFICE O/P EST LOW 20 MIN: CPT | Performed by: STUDENT IN AN ORGANIZED HEALTH CARE EDUCATION/TRAINING PROGRAM

## 2024-07-18 PROCEDURE — 3078F DIAST BP <80 MM HG: CPT | Performed by: STUDENT IN AN ORGANIZED HEALTH CARE EDUCATION/TRAINING PROGRAM

## 2024-07-18 PROCEDURE — 99215 OFFICE O/P EST HI 40 MIN: CPT | Performed by: STUDENT IN AN ORGANIZED HEALTH CARE EDUCATION/TRAINING PROGRAM

## 2024-07-18 PROCEDURE — 83036 HEMOGLOBIN GLYCOSYLATED A1C: CPT | Performed by: STUDENT IN AN ORGANIZED HEALTH CARE EDUCATION/TRAINING PROGRAM

## 2024-07-18 PROCEDURE — 3075F SYST BP GE 130 - 139MM HG: CPT | Performed by: STUDENT IN AN ORGANIZED HEALTH CARE EDUCATION/TRAINING PROGRAM

## 2024-07-18 ASSESSMENT — FIBROSIS 4 INDEX: FIB4 SCORE: 3.32

## 2024-07-19 RX ORDER — HYDROCORTISONE 10 MG/1
TABLET ORAL
Qty: 320 TABLET | Refills: 5 | Status: SHIPPED | OUTPATIENT
Start: 2024-07-19

## 2024-07-26 DIAGNOSIS — I10 ESSENTIAL HYPERTENSION: ICD-10-CM

## 2024-07-30 RX ORDER — DILTIAZEM HYDROCHLORIDE 120 MG/1
120 CAPSULE, COATED, EXTENDED RELEASE ORAL DAILY
Qty: 90 CAPSULE | Refills: 0 | Status: SHIPPED | OUTPATIENT
Start: 2024-07-30 | End: 2024-07-31 | Stop reason: SDUPTHER

## 2024-07-31 ENCOUNTER — OFFICE VISIT (OUTPATIENT)
Dept: CARDIOLOGY | Facility: MEDICAL CENTER | Age: 74
End: 2024-07-31
Attending: NURSE PRACTITIONER
Payer: MEDICARE

## 2024-07-31 VITALS
RESPIRATION RATE: 16 BRPM | OXYGEN SATURATION: 95 % | SYSTOLIC BLOOD PRESSURE: 120 MMHG | HEART RATE: 88 BPM | HEIGHT: 72 IN | BODY MASS INDEX: 28.04 KG/M2 | WEIGHT: 207 LBS | DIASTOLIC BLOOD PRESSURE: 66 MMHG

## 2024-07-31 DIAGNOSIS — Z79.899 HIGH RISK MEDICATION USE: ICD-10-CM

## 2024-07-31 DIAGNOSIS — E78.5 DYSLIPIDEMIA: ICD-10-CM

## 2024-07-31 DIAGNOSIS — I48.0 PAROXYSMAL ATRIAL FIBRILLATION (HCC): ICD-10-CM

## 2024-07-31 DIAGNOSIS — I10 ESSENTIAL HYPERTENSION: ICD-10-CM

## 2024-07-31 DIAGNOSIS — D68.69 HYPERCOAGULABLE STATE DUE TO PAROXYSMAL ATRIAL FIBRILLATION (HCC): ICD-10-CM

## 2024-07-31 DIAGNOSIS — I48.0 HYPERCOAGULABLE STATE DUE TO PAROXYSMAL ATRIAL FIBRILLATION (HCC): ICD-10-CM

## 2024-07-31 DIAGNOSIS — Z72.0 TOBACCO ABUSE: ICD-10-CM

## 2024-07-31 DIAGNOSIS — E03.9 HYPOTHYROIDISM, UNSPECIFIED TYPE: ICD-10-CM

## 2024-07-31 LAB — EKG IMPRESSION: NORMAL

## 2024-07-31 PROCEDURE — 99213 OFFICE O/P EST LOW 20 MIN: CPT

## 2024-07-31 PROCEDURE — 99407 BEHAV CHNG SMOKING > 10 MIN: CPT

## 2024-07-31 PROCEDURE — 93005 ELECTROCARDIOGRAM TRACING: CPT

## 2024-07-31 RX ORDER — FELODIPINE 2.5 MG/1
2.5 TABLET, EXTENDED RELEASE ORAL DAILY
Qty: 90 TABLET | Refills: 3 | Status: SHIPPED | OUTPATIENT
Start: 2024-07-31

## 2024-07-31 RX ORDER — DILTIAZEM HYDROCHLORIDE 120 MG/1
120 CAPSULE, COATED, EXTENDED RELEASE ORAL DAILY
Qty: 90 CAPSULE | Refills: 0 | Status: SHIPPED | OUTPATIENT
Start: 2024-07-31

## 2024-07-31 RX ORDER — SIMVASTATIN 20 MG
20 TABLET ORAL EVERY MORNING
Qty: 90 TABLET | Refills: 3 | Status: SHIPPED | OUTPATIENT
Start: 2024-07-31

## 2024-07-31 RX ORDER — FLECAINIDE ACETATE 100 MG/1
100 TABLET ORAL 2 TIMES DAILY
Qty: 180 TABLET | Refills: 3 | Status: SHIPPED | OUTPATIENT
Start: 2024-07-31

## 2024-07-31 ASSESSMENT — ENCOUNTER SYMPTOMS
LIGHT-HEADEDNESS: 0
SYNCOPE: 0
SHORTNESS OF BREATH: 0
DIZZINESS: 0
PALPITATIONS: 0
NEAR-SYNCOPE: 0
ORTHOPNEA: 0
HEADACHES: 0
DYSPNEA ON EXERTION: 0
PND: 0

## 2024-07-31 ASSESSMENT — FIBROSIS 4 INDEX: FIB4 SCORE: 3.32

## 2024-08-06 DIAGNOSIS — E55.9 VITAMIN D DEFICIENCY: ICD-10-CM

## 2024-08-06 NOTE — TELEPHONE ENCOUNTER
Received request via: Pharmacy    Was the patient seen in the last year in this department? Yes    Does the patient have an active prescription (recently filled or refills available) for medication(s) requested? No    Pharm[facility] Name[facility]

## 2024-08-18 RX ORDER — ERGOCALCIFEROL 1.25 MG/1
50000 CAPSULE, LIQUID FILLED ORAL
Qty: 12 CAPSULE | Refills: 1 | Status: SHIPPED | OUTPATIENT
Start: 2024-08-18

## 2024-09-23 ENCOUNTER — TELEPHONE (OUTPATIENT)
Dept: ENDOCRINOLOGY | Facility: MEDICAL CENTER | Age: 74
End: 2024-09-23
Payer: MEDICARE

## 2024-09-23 NOTE — TELEPHONE ENCOUNTER
Called patient and left a voicemail to remind them of their appt next week and that they have labs due prior to their appt. I left a callback number in case they had any questions or concerns

## 2024-09-27 NOTE — PROGRESS NOTES
Follow up Endocrinology Visit  Last visit with Jerome Seymour on: 2/20/24  Last visit with me on: 7/18/24    Chief complaint:  Artem Joseph, 74 y.o., male, who is here for follow up for hypogonadism, hypothyroidism, adrenal insufficiency, T2DM management.     Interval history:   - overall doing well, noted fatigue and decreased sexual drive with  being off testosterone  - has an excellent glycemic control  - reviewed recent labs and imaging  Prior notes:  7/18/24  - since last visit had multiple hypoglycemic episodes -> stopped Tresiba and Metformin, has excellent glycemic control  - tolerates Mounjaro well  - weight is stable    Current therapy:  Levothyroxine 150 mcg  Hydrocortisone 20/10 mg(2 pm)  Mounjaro 5 mg  Testosterone 160 mg q2 wks - off since 7/18/24     Tolerates medications: well  Compliant: yes    Previously used therapy:  Metformin - was well tolerated  Tresiba - stopped due to hypoglycemia after starting on Mounjaro  Jardiance 25 mg - no side effects    BG control:  CGM type - DexCom G7  Period -  7/5/24 - 7/18/24 -> 9/17/24 - 9/30/24  Data were reviewed and discussed with the patient  Active time - 86 -> 71%  ABG - 109 +/- 21  -> 121 +/- 24 mg/dl  GMI - 5.9 -> N/A%  TIR - 99 -> 98%  TAR - high < 1 -> 2%, very high - 0 -> 0%  TBR - low < 1-> 0%, very low - 0%  9/17/24 - 9/30/24 7/5/24 - 7/18/24      DM history:  - diagnosed in 2023, presented with symptomatic hyperglycemia, HbA1C - 14%, preceeded by pre-diabetes  - weight at the time of the presentation - 195 lb (BMI = 26)  - hospitalizations for DKA/HHS/severe hyperglycemia/severe hypoglycemia in the past: none  - prior therapy: Metformin, Jardiance, Tresiba -> added Mounjaro     Hypothyroidism:  - Hx of recurrent melanoma treated in the past with Nivolimab in 2016 -> around that time he was diagnosed with hypothyroidism   - on chronic therapy with LT4    Primary hypogonadism:  - diagnosed based on low testosterone and elevated LH level in  "2018  - had postatectomy due to BPH, negative for cancer  - on long-term replacement testosterone therapy    Polycythemia  - previously was doing phlebotomy but didn't like the procedure as felt drawn  after that  COPD, on O2  JOHN was ruled out  - followed by pulmonologist    Hx of secondary adrenal insufficiency:  - developed due to use high dose steroids for melanoma chemotherapy vs secondary Nivolimab therapy    Osteoporosis  S/p compression fracture of L1 - 2022  - hx of use of high dose steroids, primary hypogonadism  - fracture after lifting ~ 100 lb    Current Medications:  Current Outpatient Medications:     vitamin D2, Ergocalciferol, (DRISDOL) 1.25 MG (73389 UT) Cap capsule, Take 1 Capsule by mouth every 7 days., Disp: 12 Capsule, Rfl: 1    simvastatin (ZOCOR) 20 MG Tab, Take 1 Tablet by mouth every morning., Disp: 90 Tablet, Rfl: 3    DILTIAZem CD (CARDIZEM CD) 120 MG CAPSULE SR 24 HR, Take 1 Capsule by mouth every day., Disp: 90 Capsule, Rfl: 0    felodipine ER (PLENDIL) 2.5 MG TABLET SR 24 HR, Take 1 Tablet by mouth every day., Disp: 90 Tablet, Rfl: 3    rivaroxaban (XARELTO) 20 MG Tab tablet, Take 1 Tablet by mouth with dinner., Disp: 90 Tablet, Rfl: 3    flecainide (TAMBOCOR) 100 MG Tab, Take 1 Tablet by mouth 2 times a day., Disp: 180 Tablet, Rfl: 3    hydrocortisone (CORTEF) 10 MG Tab, Take 20 mg in am and 10 mg at pm, in case of sickness - double dose of the medication x 3 days, Disp: 320 Tablet, Rfl: 5    TRELEGY ELLIPTA 100-62.5-25 MCG/ACT inhaler, INHALE 1 PUFF BY MOUTH AND INTO THE LUNGS ONCE DAILY, Disp: 180 Each, Rfl: 0    Tirzepatide (MOUNJARO) 2.5 MG/0.5ML Solution Pen-injector, Inject 2.5 mg under the skin every 7 days., Disp: 6 mL, Rfl: 11    SYRINGE-NEEDLE, DISP, 3 ML (B-D INTEGRA SYRINGE) 23G X 1\" 3 ML Misc, 23 g every 14 days., Disp: 30 Each, Rfl: 11    HYDROcodone-acetaminophen (NORCO) 5-325 MG Tab per tablet, Take 1 Tablet by mouth 3 times a day., Disp: , Rfl:     sildenafil citrate " (VIAGRA) 100 MG tablet, 0.5-1 tab po prn, 45 minutes prior to desired erection and on empty stomach, Disp: , Rfl:     levothyroxine (SYNTHROID) 150 MCG Tab, Take 1 Tablet by mouth every day., Disp: 90 Tablet, Rfl: 4    Blood Glucose Monitoring Suppl Device, Insurance preference, Disp: 1 Each, Rfl: 1    Blood Glucose Test Strips, Test strips order: Test strips for Insurance preference meter. Sig: use 4 and prn ssx high or low sugar #100 RF x 3, Disp: 400 Strip, Rfl: 11    Lancets, Lancets order: Lancets for Insurance preference meter. Sig: use 4 and prn ssx high or low sugar. #100 RF x 3, Disp: 100 Each, Rfl: 11    BD PEN NEEDLE SHAWN U/F, For use with 2-3 daily insulin and or GLP-1 injection., Disp: 400 Each, Rfl: 3    albuterol 108 (90 Base) MCG/ACT Aero Soln inhalation aerosol, Inhale 2 Puffs every 6 hours as needed for Shortness of Breath., Disp: 1 Each, Rfl: 11    acetaminophen (TYLENOL) 325 MG Tab, acetaminophen 325 mg tablet  take 2 tablets by mouth three times a day if needed for pain for 5 days, Disp: , Rfl:     omeprazole (PRILOSEC) 20 MG CPDR, Take 20 mg by mouth every day., Disp: , Rfl:     PHYSICAL EXAM:   Vital signs: /68   Pulse 81   Ht 1.829 m (6')   Wt 92.7 kg (204 lb 6.4 oz)   SpO2 96%   BMI 27.72 kg/m²   GENERAL: Well-developed, well-nourished  in no apparent distress. Noted facial erythema.   CARDIOVASCULAR: Regular rate and rhythm.   LUNGS: No dyspnea  ABDOMEN: Soft, nondistended  EXTREMITIES: No clubbing, cyanosis, or edema.   NEUROLOGICAL: Cranial nerves II-XII are grossly intact No visible tremor with both outstretched hands  SKIN: No rashes, lesions. Turgor is normal.    Labs:  - reviewed  Plan:  Obtain 24 hr urine Ca/creatinine, BS-ALP, CTX/PN1P, phosphorus, vit D.  Repeat DEXA Scan.   Hct level in 3-4 weeks.   Hypogonadism work up:   Reference Range 07/23/18 07:07 10/01/18 07:20 11/14/18 07:06 04/25/22 07:36 04/10/23 10:00 02/07/24 08:06 7/8/24  13:27 08/20/24  07:21  9/20/24  07:44    Hb 13.0 - 17.7 g/dL  17.8 (H) 18.4 (H) 17.7 16.4 18.0 (H) 17.8 (H) 17.3 (H) 16.9    Hct 37.5 - 53.0 %  53.7 (H) 54.4 (H) 53.3 (H) 49.4 54.9 (H) 55.2 (H) 52.4 50.4    PSA, total 0.0 - 4.0 ng/mL    3.3         Testosterone,Total 264 - 916 ng/dL  911  309   677  183 (L)    Testosterone, total LM/ - 916 ng/dL    309 248.1 (L)        SHBG nmol/L    42.1         % Free Testosterone %    0.8         Free Testosterone 6.6 - 18.1 pg/mL 3.0 (L) 14.3     134.1  27.5 (L)    Estradiol 7.6 - 42.6 pg/mL  44.3 (H) 25.8 18.8         LH 1.7 - 8.6 mIU/mL 15.8 (H)            FSH 1.5 - 12.4 mIU/mL             ACTH 7.2 - 63.3 pg/mL    <1.5 (L) <1.5 (L)        Cortisol              IGF-1 (Somat) 59 - 230 ng/mL    153         Testosterone  Rx Mg q2 weeks       160 mg off off 120 mg     Hypothyroidism therapy monitoring:   Reference Range 09/11/17  07/23/18  01/02/20  04/25/22  04/10/23  12/14/23  02/07/24  7/8/24   TSH 0.450 - 4.500 uIU/mL 2.410 1.660 3.500 2.440 1.630 2.350 2.150 1.83   fT4 0.82 - 1.77 ng/dL 0.83 1.53 1.34 1.72 2.02 (H) 1.72 1.82 (H) 1.38   LT4         150     HbA1C/BG/Hb:   Reference Range 11/8/23 02/20/24 7/18/24   HbA1C 5.8 % 14 ! 8.4 ! 5.9      Reference Range 7/8/24   Hb 13.0 - 17.7 g/dL 17.8 (H)   Hct 37.5 - 51.0 % 55.2 (H)     C-peptide/glucose/T1DM Abs:   Reference Range  12/14/23   Glucose 70 - 99 mg/dL 130 (H)   C-Peptide 1.1 - 4.4 ng/mL 1.0 (L)   IA-2 Abs U/mL <7.5   Feroz-65 Abs 0.0 - 5.0 U/mL <5.0     Kidney function/MACR:   Reference Range  12/14/23 02/07/24    Creatinine 0.76 - 1.27 mg/dL 1.02 0.99   MACR 0 - 29 mg/g creat 27      LFTs/FIB-4:   Reference Range 06/09/23 12/14/23 02/07/24    Platelet Count 150 - 450 10*3/uL 113 (L) (E)     AST(SGOT) 0 - 40 IU/L  22 23   ALT(SGPT) 0 - 44 IU/L  17 20   ALP 44 - 121 IU/L  134 (H) 134 (H)   FIB-4 < 2.0        Lipid panel:   Reference Range 12/14/23    Triglycerides 0 - 149 mg/dL 113   HDL >39 mg/dL 67   LDL Chol Calc (NIH) 0 - 99 mg/dL 60      Osteoporosis work up:   Reference Range 02/07/24 9/2/24   Sodium 134 - 144 mmol/L 142 145   Creatinine 0.76 - 1.27 mg/dL 0.99 1.1   GFR > 60  71   Calcium 8.6 - 10.2 mg/dL 9.8 9.7   AST(SGOT) 0 - 40 IU/L 23 34   ALT(SGPT) 0 - 44 IU/L 20 29   ALP 44 - 121 IU/L 134 (H) 164 (H)   BS-ALP < 14.9???  26.7   Vit D 30.0 - 100.0 ng/mL 47.7 40.9   TSH 0.450 - 4.500 uIU/mL 2.150    PN1P   73   CTX   588     24 hr urine:   Reference Range 9/20/24   Total volume  1700 ml   Creatinine 0.8 - 2.0 g/day 1.9   Calcium  < 300 mg/24 h 454 (H)     Imaging:  - reviewed  DEXA scan:  Date Machine L1- L4  BMD/ T-score LFN  BMD/ T-score RFN  BMD/ T-score FRAX Rx    9/2/22   1.129 / - 0.8  0.855 / - 1.7  0.788 / - 2.2       Brain MRI on 11/26/19:  INDICATIONS: Hx of melanoma scalp and neck  IMPRESSION:  1.  No evidence of intracranial metastases.  2.  Mild chronic microvascular ischemic type changes.  3.  No acute intracranial pathology.  4.  Partially visualized scarring within the suboccipital soft tissues.    ASSESSMENT/PLAN:   # Type 2 diabetes mellitus without complication  - duration x 2 years  - on SGLT-2 inhibitor, GLP-1/GIP agonist  - HbA1C  - 14% (11/2023) -> 8.4% (2/2024) -> 5.9% (7/2024)  - has preserved insulin secretion: 12/2023 - C-peptide - 1.0, glucose - 130; negative T1DM Abs - ROBB 65, IA-2 Abs    Microvascular complications: denies peripheral neuropathy, nephropathy, retinopathy  Macrovascular complications:  denies CAD, CVA, PAD  Associated comorbidities: dyslipidemia, paroxysmal Afib, Hx of metastatic melanoma, GERD, HTN, primary hypogonadism, secondary adrenal insufficiency, hypothyroidism, polycythemia, osteoporosis, s/p prostatectomy for BPH with compressive symptoms     DM complication screening:  Labs reviewed:  MACR - neg, last checked in 12/2023  Creat/GFR wnl, last checked in 2/2024  LDL - 60 - at target, last checked in 12/2023     Patient is taking statin: on Simvastatin 20 mg  Patient is taking ASA: no, on  Xarelto  Patient is taking ACE/ARB: no     Last eye exam: 6/2024, denies DR  Last foot exam: follows podiatrist - 2 mo ago, normal monofilament test, denies tingling, numbness, burning sensation, lesions,    reports significant callus on his L foot -> taken care of     Home medications:  Mounjaro 5 mg     Discussion:  -  again, congratulated patient with excellent glycemic control  Prior notes:  7/18/24  - patient has recently diagnosed T2DM with preserved insulin secretion  - agree with stopping basal insulin and Metformin  - has excellent glycemic control while on SGLT-2 inhibitor and GLP-1/GIP agonist, ok to simplify the regimen for GLP-1/GIP agonist     Plan:  Discussed with the patient goals for DM management:  Fasting BG 90 - 130  2 hrs postprandial  - 180  HbA1C < 7.0%     Therapy adjustments:  - continue Mounjaro 5 mg weekly    3. Continue using CGM - DexCom G7.   4. Given instructions for foot care.   5. Refer to podiatrist to address L foot callus.     # Hypothyroidism, acquired  - likely nivolumab PD-1/PD-L1 inhibitor - induced, as onset was corresponding with that therapy  - well controlled with LT4 replacement therapy - 150 mcg vs weight base calculated dose ~ 152 mcg/day  Plan:  Continue Levothyroxine 150 mcg/day  Repeat TFTs in 6-12 mo    # Adrenal insufficiency (HCC)  - attempt to taper down dose of HC to 25 -> 20 ->15 as tolerated, consider retesting for AI on upcoming visits  Prior notes:  7/18/24  - either secondary to nivolumab therapy vs steroid use  - on replacement therapy with HC 20/10 at 10 am (wakes up) and 5 pm, no clear Cushingoid features, has facial erythema but likely to be related to polycythemia  Plan:  Decrease hydrocortisone: 10 am - 20 -> 15 mg, 2 pm -> 10 mg, if well tolerated -> 15/5 -> if well tolerated 10/5  Discussed sick day rules.   3.   Will order Solu-Cortef for emergency situations.   4.   Consider to retest for Dx of adrenal insufficiency on upcoming visits.     #  Primary testicular hypogonadism  - polycythemia resolved  - has symptomatic low testosterone level  - resume testosterone replacement therapy at lower dose  Prior notes:  7/18/24  - diagnosed based on low testosterone and elevated LH level in 2018  - had postatectomy due to BPH, negative for cancer  - on long-term replacement testosterone therapy, currently on IM testosterone 160 mg q2 week  - states that testosterone effect lasts long enough, midcycle testosterone level is in a mid range  - main concern - polycythemia - discussed risks associated with that  - will hold testosterone until Hct < 54 -> resume at lower dose   Plan:  Resume testosterone cypionate at 120 mg (0.6 ml) q 2 weeks  Repeat testosterone panel midcycle, CBC.     # Polycythemia, resolved after holding testosterone therapy  - will resume testosterone therapy at lower dose  Prior notes:  7/18/24  - could be multifactorial  - testosterone therapy is contributory  - denies JOHN  - also noted chronic thrombocytopenia -> recommended to discuss with PCP vs hematology referral to rule out hematologic disease  - previously had phlebotomy but didn't like the procedure as felt drawn after that  Plan:  Resume testosterone therapy at lower dose as discussed above  Repeat Hct level.   Discuss further work up with PCP, consider hematology consult    # Osteoporosis  # Compression fracture of lumbar vertebra, unspecified lumbar vertebral level, sequela  - work up for secondary causes revealed hypercalciuria and ALP elevation  - on DEXA scan BMD at hips consistent with osteopenia  - discussed management of hypercalciuria: Na restriction (pt is afraid that it would be challenging for him as he eats precooked dinner), HCTZ  - as patient already have soft BP on current antihypertensive medication -> stop of one of CCB (should not be on 2 medications from the same class), start on low dose of HCTZ, consider increasing the dose and decreasing the dose of another  CCB  Prior notes:  7/18/24  - not addressed this issue today  - due for another DEXA scan  - will also get 24 hr urine Ca to rule out hypercalciuria and Ca malabsorption  - needs pharmacologic therapy given previous fragility vertebral fracture  Plan:  Na restriction diet.   Stop felopidine.   Start HCTZ 12. 5 mg  Repeat CMP, 24 hr urine Ca/creatinine.  Fall precautions.   Further discussion about pharmacologic management of the osteoporosis.     # Dyslipidemia  - on medium intensity statin  - LDL is at goal  - continue current regimen    RTC: 2 mo    Total time (face-to-face and non-face-to face time):  55 min - discussion of multiple health issues, diagnoses, treatment, prognosis, medical charts, lab, imaging review, documentation.     Plan reviewed with the patient and agreed with plan.  All questions answered to patient's satisfaction.  Thank you kindly for allowing me to participate in the diabetes care plan for this patient.    Joseline Bhatt MD      CC:   Mari Jenkins P.A.-C.

## 2024-09-30 ENCOUNTER — OFFICE VISIT (OUTPATIENT)
Dept: ENDOCRINOLOGY | Facility: MEDICAL CENTER | Age: 74
End: 2024-09-30
Attending: STUDENT IN AN ORGANIZED HEALTH CARE EDUCATION/TRAINING PROGRAM
Payer: MEDICARE

## 2024-09-30 VITALS
WEIGHT: 204.4 LBS | BODY MASS INDEX: 27.68 KG/M2 | SYSTOLIC BLOOD PRESSURE: 104 MMHG | OXYGEN SATURATION: 96 % | HEIGHT: 72 IN | DIASTOLIC BLOOD PRESSURE: 68 MMHG | HEART RATE: 81 BPM

## 2024-09-30 DIAGNOSIS — E11.9 TYPE 2 DIABETES MELLITUS WITHOUT COMPLICATION, WITH LONG-TERM CURRENT USE OF INSULIN (HCC): ICD-10-CM

## 2024-09-30 DIAGNOSIS — R82.994 HYPERCALCIURIA: ICD-10-CM

## 2024-09-30 DIAGNOSIS — E29.1 HYPOGONADISM IN MALE: ICD-10-CM

## 2024-09-30 DIAGNOSIS — Z79.4 TYPE 2 DIABETES MELLITUS WITHOUT COMPLICATION, WITH LONG-TERM CURRENT USE OF INSULIN (HCC): ICD-10-CM

## 2024-09-30 DIAGNOSIS — D75.1 POLYCYTHEMIA, SECONDARY: ICD-10-CM

## 2024-09-30 DIAGNOSIS — E29.1 PRIMARY TESTICULAR HYPOGONADISM: ICD-10-CM

## 2024-09-30 PROCEDURE — 99212 OFFICE O/P EST SF 10 MIN: CPT | Performed by: STUDENT IN AN ORGANIZED HEALTH CARE EDUCATION/TRAINING PROGRAM

## 2024-09-30 PROCEDURE — 95250 CONT GLUC MNTR PHYS/QHP EQP: CPT | Performed by: STUDENT IN AN ORGANIZED HEALTH CARE EDUCATION/TRAINING PROGRAM

## 2024-09-30 RX ORDER — TIRZEPATIDE 5 MG/.5ML
5 INJECTION, SOLUTION SUBCUTANEOUS
COMMUNITY

## 2024-09-30 RX ORDER — TESTOSTERONE CYPIONATE 200 MG/ML
120 INJECTION, SOLUTION INTRAMUSCULAR
Qty: 1.2 ML | Refills: 2 | Status: SHIPPED | OUTPATIENT
Start: 2024-09-30 | End: 2024-12-23

## 2024-09-30 RX ORDER — HYDROCHLOROTHIAZIDE 12.5 MG/1
12.5 TABLET ORAL DAILY
Qty: 90 TABLET | Refills: 4 | Status: SHIPPED | OUTPATIENT
Start: 2024-09-30

## 2024-09-30 RX ORDER — NEEDLES, FILTER 19GX1 1/2"
NEEDLE, DISPOSABLE MISCELLANEOUS
Qty: 10 EACH | Refills: 11 | Status: SHIPPED | OUTPATIENT
Start: 2024-09-30

## 2024-09-30 ASSESSMENT — FIBROSIS 4 INDEX: FIB4 SCORE: 3.32

## 2024-09-30 NOTE — PATIENT INSTRUCTIONS
Levothyroxine 150 mcg  Hydrocortisone 20/10 mg     Try to decrease dose to 15/10, if well tolerated -> 15/5, if well tolerated -> 10/5      - do dose adjustment in 1-2 weeks interval, if feeling more fatigued, nauseated, lightheaded -> go back to previous dose    Mounjaro 5 mg  Testosterone 0.6 ml every 2 weeks if Hb/Hct is ok on latest labs

## 2024-10-31 ENCOUNTER — TELEPHONE (OUTPATIENT)
Dept: CARDIOLOGY | Facility: MEDICAL CENTER | Age: 74
End: 2024-10-31
Payer: MEDICARE

## 2024-10-31 DIAGNOSIS — I48.0 PAROXYSMAL ATRIAL FIBRILLATION (HCC): ICD-10-CM

## 2024-10-31 DIAGNOSIS — Z79.899 HIGH RISK MEDICATION USE: ICD-10-CM

## 2024-11-01 ENCOUNTER — TELEPHONE (OUTPATIENT)
Dept: CARDIOLOGY | Facility: MEDICAL CENTER | Age: 74
End: 2024-11-01
Payer: MEDICARE

## 2024-11-01 NOTE — TELEPHONE ENCOUNTER
Received Refill PA request via [facility]  for Xarelto 20mg . (Quantity:90, Day Supply:90)     Insurance: [facility]  Member ID:  [id number]  BIN: [id number]  PCN: [id number]  Group: [id number]     Ran Test claim via [place] & medication Pays for a $0 copay. Will outreach to patient to offer specialty pharmacy services and or release to preferred pharmacy

## 2024-11-26 ENCOUNTER — TELEPHONE (OUTPATIENT)
Dept: ENDOCRINOLOGY | Facility: MEDICAL CENTER | Age: 74
End: 2024-11-26
Payer: MEDICARE

## 2024-11-27 NOTE — TELEPHONE ENCOUNTER
Called Pt and left a voicemail reminding them of their appt on 12/10/24 and that they have labs due prior to their appt. I left a call back number in case they had any questions or concerns.

## 2024-12-07 NOTE — PROGRESS NOTES
Follow up Endocrinology Visit  Last visit with Jerome Seymour on: 2/20/24  Last visit with me on: 9/30/24    Chief complaint:  Artem Joseph, 74 y.o., male, who is here for follow up for hypogonadism, hypothyroidism, adrenal insufficiency, T2DM management.     Interval history:   Prior notes:  7/18/24  - since last visit had multiple hypoglycemic episodes -> stopped Tresiba and Metformin, has excellent glycemic control  - tolerates Mounjaro well  - weight is stable  9/30/24  - overall doing well, noted fatigue and decreased sexual drive with  being off testosterone  - has an excellent glycemic control  - reviewed recent labs and imaging    Current therapy:  Levothyroxine 150 mcg  Hydrocortisone 20/10 mg(2 pm)  Mounjaro 5 mg  Testosterone 160 mg q2 wks - off since 7/18/24     Tolerates medications: well  Compliant: yes    Previously used therapy:  Metformin - was well tolerated  Tresiba - stopped due to hypoglycemia after starting on Mounjaro  Jardiance 25 mg - no side effects    BG control:  CGM type - DexCom G7  Period -  7/5/24 - 7/18/24 -> 9/17/24 - 9/30/24 ->   Data were reviewed and discussed with the patient  Active time - 86 -> 71%  ABG - 109 +/- 21  -> 121 +/- 24 mg/dl  GMI - 5.9 -> N/A%  TIR - 99 -> 98%  TAR - high < 1 -> 2%, very high - 0 -> 0%  TBR - low < 1-> 0%, very low - 0%  9/17/24 - 9/30/24 7/5/24 - 7/18/24      DM history:  - diagnosed in 2023, presented with symptomatic hyperglycemia, HbA1C - 14%, preceeded by pre-diabetes  - weight at the time of the presentation - 195 lb (BMI = 26)  - hospitalizations for DKA/HHS/severe hyperglycemia/severe hypoglycemia in the past: none  - prior therapy: Metformin, Jardiance, Tresiba -> added Mounjaro     Hypothyroidism:  - Hx of recurrent melanoma treated in the past with Nivolimab in 2016 -> around that time he was diagnosed with hypothyroidism   - on chronic therapy with LT4    Primary hypogonadism:  - diagnosed based on low testosterone and elevated  "LH level in 2018  - had postatectomy due to BPH, negative for cancer  - on long-term replacement testosterone therapy    Polycythemia  - previously was doing phlebotomy but didn't like the procedure as felt drawn  after that  COPD, on O2  JOHN was ruled out  - followed by pulmonologist    Hx of secondary adrenal insufficiency:  - developed due to use high dose steroids for melanoma chemotherapy vs secondary Nivolimab therapy    Osteoporosis  S/p compression fracture of L1 - 2022  - hx of use of high dose steroids, primary hypogonadism  - fracture after lifting ~ 100 lb    Current Medications:  Current Outpatient Medications:     rivaroxaban (XARELTO) 20 MG Tab tablet, Take 1 Tablet by mouth with dinner., Disp: 90 Tablet, Rfl: 2    MOUNJARO 5 MG/0.5ML Solution Pen-injector, 5 mg every 7 days., Disp: , Rfl:     hydroCHLOROthiazide 12.5 MG tablet, Take 1 Tablet by mouth every day., Disp: 90 Tablet, Rfl: 4    testosterone cypionate (DEPO-TESTOSTERONE) 200 MG/ML injection, Inject 0.6 mL into the shoulder, thigh, or buttocks every 14 days for 84 days. Vials are single injection, 2 vials needed per month. Please, discard vial once it used., Disp: 1.2 mL, Rfl: 2    SYRINGE-NEEDLE, DISP, 3 ML (B-D INTEGRA SYRINGE) 23G X 1\" 3 ML Misc, Use every 2 weeks for testosterone injection, Disp: 10 Each, Rfl: 11    vitamin D2, Ergocalciferol, (DRISDOL) 1.25 MG (08183 UT) Cap capsule, Take 1 Capsule by mouth every 7 days., Disp: 12 Capsule, Rfl: 1    simvastatin (ZOCOR) 20 MG Tab, Take 1 Tablet by mouth every morning., Disp: 90 Tablet, Rfl: 3    DILTIAZem CD (CARDIZEM CD) 120 MG CAPSULE SR 24 HR, Take 1 Capsule by mouth every day., Disp: 90 Capsule, Rfl: 0    flecainide (TAMBOCOR) 100 MG Tab, Take 1 Tablet by mouth 2 times a day., Disp: 180 Tablet, Rfl: 3    hydrocortisone (CORTEF) 10 MG Tab, Take 20 mg in am and 10 mg at pm, in case of sickness - double dose of the medication x 3 days, Disp: 320 Tablet, Rfl: 5    TRELEGY ELLIPTA " 100-62.5-25 MCG/ACT inhaler, INHALE 1 PUFF BY MOUTH AND INTO THE LUNGS ONCE DAILY, Disp: 180 Each, Rfl: 0    Tirzepatide (MOUNJARO) 2.5 MG/0.5ML Solution Pen-injector, Inject 2.5 mg under the skin every 7 days. (Patient not taking: Reported on 9/30/2024), Disp: 6 mL, Rfl: 11    HYDROcodone-acetaminophen (NORCO) 5-325 MG Tab per tablet, Take 1 Tablet by mouth 3 times a day., Disp: , Rfl:     sildenafil citrate (VIAGRA) 100 MG tablet, 0.5-1 tab po prn, 45 minutes prior to desired erection and on empty stomach, Disp: , Rfl:     levothyroxine (SYNTHROID) 150 MCG Tab, Take 1 Tablet by mouth every day., Disp: 90 Tablet, Rfl: 4    Blood Glucose Monitoring Suppl Device, Insurance preference, Disp: 1 Each, Rfl: 1    Blood Glucose Test Strips, Test strips order: Test strips for Insurance preference meter. Sig: use 4 and prn ssx high or low sugar #100 RF x 3, Disp: 400 Strip, Rfl: 11    Lancets, Lancets order: Lancets for Insurance preference meter. Sig: use 4 and prn ssx high or low sugar. #100 RF x 3, Disp: 100 Each, Rfl: 11    BD PEN NEEDLE SHAWN U/F, For use with 2-3 daily insulin and or GLP-1 injection., Disp: 400 Each, Rfl: 3    albuterol 108 (90 Base) MCG/ACT Aero Soln inhalation aerosol, Inhale 2 Puffs every 6 hours as needed for Shortness of Breath., Disp: 1 Each, Rfl: 11    acetaminophen (TYLENOL) 325 MG Tab, acetaminophen 325 mg tablet  take 2 tablets by mouth three times a day if needed for pain for 5 days, Disp: , Rfl:     omeprazole (PRILOSEC) 20 MG CPDR, Take 20 mg by mouth every day., Disp: , Rfl:     PHYSICAL EXAM:   Vital signs: /76   Pulse 90   Ht 1.829 m (6') Comment: pt stated  Wt 91.8 kg (202 lb 6.4 oz)   SpO2 97%   BMI 27.45 kg/m²   GENERAL: Well-developed, well-nourished  in no apparent distress. Noted facial erythema.   CARDIOVASCULAR: Regular rate and rhythm.   LUNGS: No dyspnea  ABDOMEN: Soft, nondistended  EXTREMITIES: No clubbing, cyanosis, or edema.   NEUROLOGICAL: Cranial nerves II-XII  are grossly intact No visible tremor with both outstretched hands  SKIN: No rashes, lesions. Turgor is normal.    Labs:  - reviewed  CMP, 24 hr urine Ca/creatinine, CBC, testosterone  Hypogonadism work up:   Reference Range 07/23/18 07:07 10/01/18 07:20 11/14/18 07:06 04/25/22 07:36 04/10/23 10:00 02/07/24 08:06 7/8/24  13:27 08/20/24  07:21 9/20/24  07:44    Hb 13.0 - 17.7 g/dL  17.8 (H) 18.4 (H) 17.7 16.4 18.0 (H) 17.8 (H) 17.3 (H) 16.9    Hct 37.5 - 53.0 %  53.7 (H) 54.4 (H) 53.3 (H) 49.4 54.9 (H) 55.2 (H) 52.4 50.4    PSA, total 0.0 - 4.0 ng/mL    3.3         Testosterone,Total 264 - 916 ng/dL  911  309   677  183 (L)    Testosterone, total LM/ - 916 ng/dL    309 248.1 (L)        SHBG nmol/L    42.1         % Free Testosterone %    0.8         Free Testosterone 6.6 - 18.1 pg/mL 3.0 (L) 14.3     134.1  27.5 (L)    Estradiol 7.6 - 42.6 pg/mL  44.3 (H) 25.8 18.8         LH 1.7 - 8.6 mIU/mL 15.8 (H)            FSH 1.5 - 12.4 mIU/mL             ACTH 7.2 - 63.3 pg/mL    <1.5 (L) <1.5 (L)        Cortisol              IGF-1 (Somat) 59 - 230 ng/mL    153         Testosterone  Rx Mg q2 weeks       160 mg off off 120 mg     Hypothyroidism therapy monitoring:   Reference Range 09/11/17  07/23/18  01/02/20  04/25/22  04/10/23  12/14/23  02/07/24  7/8/24   TSH 0.450 - 4.500 uIU/mL 2.410 1.660 3.500 2.440 1.630 2.350 2.150 1.83   fT4 0.82 - 1.77 ng/dL 0.83 1.53 1.34 1.72 2.02 (H) 1.72 1.82 (H) 1.38   LT4         150     HbA1C/BG/Hb:   Reference Range 11/8/23 02/20/24 7/18/24   HbA1C 5.8 % 14 ! 8.4 ! 5.9      Reference Range 7/8/24   Hb 13.0 - 17.7 g/dL 17.8 (H)   Hct 37.5 - 51.0 % 55.2 (H)     C-peptide/glucose/T1DM Abs:   Reference Range  12/14/23   Glucose 70 - 99 mg/dL 130 (H)   C-Peptide 1.1 - 4.4 ng/mL 1.0 (L)   IA-2 Abs U/mL <7.5   Feroz-65 Abs 0.0 - 5.0 U/mL <5.0     Kidney function/MACR:   Reference Range  12/14/23 02/07/24    Creatinine 0.76 - 1.27 mg/dL 1.02 0.99   MACR 0 - 29 mg/g creat 27      LFTs/FIB-4:    Reference Range 06/09/23 12/14/23 02/07/24    Platelet Count 150 - 450 10*3/uL 113 (L) (E)     AST(SGOT) 0 - 40 IU/L  22 23   ALT(SGPT) 0 - 44 IU/L  17 20   ALP 44 - 121 IU/L  134 (H) 134 (H)   FIB-4 < 2.0        Lipid panel:   Reference Range 12/14/23    Triglycerides 0 - 149 mg/dL 113   HDL >39 mg/dL 67   LDL Chol Calc (NIH) 0 - 99 mg/dL 60     Osteoporosis work up:   Reference Range 02/07/24 9/2/24   Sodium 134 - 144 mmol/L 142 145   Creatinine 0.76 - 1.27 mg/dL 0.99 1.1   GFR > 60  71   Calcium 8.6 - 10.2 mg/dL 9.8 9.7   AST(SGOT) 0 - 40 IU/L 23 34   ALT(SGPT) 0 - 44 IU/L 20 29   ALP 44 - 121 IU/L 134 (H) 164 (H)   BS-ALP < 14.9???  26.7   Vit D 30.0 - 100.0 ng/mL 47.7 40.9   TSH 0.450 - 4.500 uIU/mL 2.150    PN1P   73   CTX   588     24 hr urine:   Reference Range 9/20/24    Total volume  1700 ml    Creatinine 0.8 - 2.0 g/day 1.9    Calcium  < 300 mg/24 h 454 (H)      Imaging:  - reviewed  DEXA scan:  Date Machine L1- L4  BMD/ T-score LFN  BMD/ T-score RFN  BMD/ T-score FRAX Rx    9/2/22   1.129 / - 0.8  0.855 / - 1.7  0.788 / - 2.2       Brain MRI on 11/26/19:  INDICATIONS: Hx of melanoma scalp and neck  IMPRESSION:  1.  No evidence of intracranial metastases.  2.  Mild chronic microvascular ischemic type changes.  3.  No acute intracranial pathology.  4.  Partially visualized scarring within the suboccipital soft tissues.    ASSESSMENT/PLAN:   # Type 2 diabetes mellitus without complication  - duration x 2 years  - on SGLT-2 inhibitor, GLP-1/GIP agonist  - HbA1C  - 14% (11/2023) -> 8.4% (2/2024) -> 5.9% (7/2024)  - has preserved insulin secretion: 12/2023 - C-peptide - 1.0, glucose - 130; negative T1DM Abs - ROBB 65, IA-2 Abs    Microvascular complications: denies peripheral neuropathy, nephropathy, retinopathy  Macrovascular complications:  denies CAD, CVA, PAD  Associated comorbidities: dyslipidemia, paroxysmal Afib, Hx of metastatic melanoma, GERD, HTN, primary hypogonadism, secondary adrenal  insufficiency, hypothyroidism, polycythemia, osteoporosis, s/p prostatectomy for BPH with compressive symptoms     DM complication screening:  Labs reviewed:  MACR - neg, last checked in 12/2023  Creat/GFR wnl, last checked in 2/2024  LDL - 60 - at target, last checked in 12/2023     Patient is taking statin: on Simvastatin 20 mg  Patient is taking ASA: no, on Xarelto  Patient is taking ACE/ARB: no     Last eye exam: 6/2024, denies DR  Last foot exam: follows podiatrist - 2 mo ago, normal monofilament test, denies tingling, numbness, burning sensation, lesions,    reports significant callus on his L foot -> taken care of     Home medications:  Mounjaro 5 mg     Discussion:  Prior notes:  7/18/24  - patient has recently diagnosed T2DM with preserved insulin secretion  - agree with stopping basal insulin and Metformin  - has excellent glycemic control while on SGLT-2 inhibitor and GLP-1/GIP agonist, ok to simplify the regimen for GLP-1/GIP agonist  9/30/24  -  again, congratulated patient with excellent glycemic control     Plan:  Discussed with the patient goals for DM management:  Fasting BG 90 - 130  2 hrs postprandial  - 180  HbA1C < 7.0%     Therapy adjustments:  - continue Mounjaro 5 mg weekly    3. Continue using CGM - DexCom G7.   4. Given instructions for foot care.   5. Refer to podiatrist to address L foot callus.     # Hypothyroidism, acquired  - likely nivolumab PD-1/PD-L1 inhibitor - induced, as onset was corresponding with that therapy  - well controlled with LT4 replacement therapy - 150 mcg vs weight base calculated dose ~ 152 mcg/day  Plan:  Continue Levothyroxine 150 mcg/day  Repeat TFTs in 6-12 mo    # Adrenal insufficiency (HCC)  Prior notes:  7/18/24  - either secondary to nivolumab therapy vs steroid use  - on replacement therapy with HC 20/10 at 10 am (wakes up) and 5 pm, no clear Cushingoid features, has facial erythema but likely to be related to polycythemia  9/30/24  - attempt to  taper down dose of HC to 25 -> 20 ->15 as tolerated, consider retesting for AI on upcoming visits  Plan:  Decrease hydrocortisone: 10 am - 20 -> 15 mg, 2 pm -> 10 mg, if well tolerated -> 15/5 -> if well tolerated 10/5  Discussed sick day rules.   3.   Will order Solu-Cortef for emergency situations.   4.   Consider to retest for Dx of adrenal insufficiency on upcoming visits.     # Primary testicular hypogonadism  Prior notes:  7/18/24  - diagnosed based on low testosterone and elevated LH level in 2018  - had postatectomy due to BPH, negative for cancer  - on long-term replacement testosterone therapy, currently on IM testosterone 160 mg q2 week  - states that testosterone effect lasts long enough, midcycle testosterone level is in a mid range  - main concern - polycythemia - discussed risks associated with that  - will hold testosterone until Hct < 54 -> resume at lower dose   9/30/24  - polycythemia resolved  - has symptomatic low testosterone level  - resume testosterone replacement therapy at lower dose  Plan:  Resume testosterone cypionate at 120 mg (0.6 ml) q 2 weeks  Repeat testosterone panel midcycle, CBC.     - Testosterone, Free & Total, Adult Male (w/SHBG); Future  - CBC WITHOUT DIFFERENTIAL; Future    # Polycythemia, resolved after holding testosterone therapy  Prior notes:  7/18/24  - could be multifactorial  - testosterone therapy is contributory  - denies JOHN  - also noted chronic thrombocytopenia -> recommended to discuss with PCP vs hematology referral to rule out hematologic disease  - previously had phlebotomy but didn't like the procedure as felt drawn after that  9/30/24  - will resume testosterone therapy at lower dose  Plan:  Resume testosterone therapy at lower dose as discussed above  Repeat Hct level.   Discuss further work up with PCP, consider hematology consult    # Osteoporosis  # Compression fracture of lumbar vertebra, unspecified lumbar vertebral level, sequela  Prior  notes:  7/18/24  - not addressed this issue today  - due for another DEXA scan  - will also get 24 hr urine Ca to rule out hypercalciuria and Ca malabsorption  - needs pharmacologic therapy given previous fragility vertebral fracture  9/30/24  - work up for secondary causes revealed hypercalciuria and ALP elevation  - on DEXA scan BMD at hips consistent with osteopenia  - discussed management of hypercalciuria: Na restriction (pt is afraid that it would be challenging for him as he eats precooked dinner), HCTZ  - as patient already have soft BP on current antihypertensive medication -> stop of one of CCB (should not be on 2 medications from the same class), start on low dose of HCTZ, consider increasing the dose and decreasing the dose of another CCB  Plan:  Na restriction diet.   Stop felopidine.   Start HCTZ 12. 5 mg  Repeat CMP, 24 hr urine Ca/creatinine.  Fall precautions.   Further discussion about pharmacologic management of the osteoporosis.     # Dyslipidemia  - on medium intensity statin  - LDL is at goal  - continue current regimen    RTC: 2 mo    Total time (face-to-face and non-face-to face time):  55 min - not including CGM download and review   - extensive discussion of multiple health issues, diagnoses, treatment, prognosis, medical charts, lab, imaging review, documentation.     Plan reviewed with the patient and agreed with plan.  All questions answered to patient's satisfaction.  Thank you kindly for allowing me to participate in the diabetes care plan for this patient.    Joseline Bhatt MD    CC:   Mari Jenkins P.A.-C.

## 2024-12-10 ENCOUNTER — OFFICE VISIT (OUTPATIENT)
Dept: ENDOCRINOLOGY | Facility: MEDICAL CENTER | Age: 74
End: 2024-12-10
Attending: STUDENT IN AN ORGANIZED HEALTH CARE EDUCATION/TRAINING PROGRAM
Payer: MEDICARE

## 2024-12-10 VITALS
BODY MASS INDEX: 27.41 KG/M2 | HEIGHT: 72 IN | SYSTOLIC BLOOD PRESSURE: 124 MMHG | OXYGEN SATURATION: 97 % | DIASTOLIC BLOOD PRESSURE: 76 MMHG | HEART RATE: 90 BPM | WEIGHT: 202.4 LBS

## 2024-12-10 DIAGNOSIS — E29.1 HYPOGONADISM IN MALE: ICD-10-CM

## 2024-12-10 DIAGNOSIS — Z79.4 TYPE 2 DIABETES MELLITUS WITHOUT COMPLICATION, WITH LONG-TERM CURRENT USE OF INSULIN (HCC): ICD-10-CM

## 2024-12-10 DIAGNOSIS — E11.9 TYPE 2 DIABETES MELLITUS WITHOUT COMPLICATION, WITH LONG-TERM CURRENT USE OF INSULIN (HCC): ICD-10-CM

## 2024-12-10 LAB
HBA1C MFR BLD: 5.3 % (ref ?–5.8)
POCT INT CON NEG: NEGATIVE
POCT INT CON POS: POSITIVE

## 2024-12-10 PROCEDURE — 83036 HEMOGLOBIN GLYCOSYLATED A1C: CPT | Performed by: STUDENT IN AN ORGANIZED HEALTH CARE EDUCATION/TRAINING PROGRAM

## 2024-12-10 PROCEDURE — 99213 OFFICE O/P EST LOW 20 MIN: CPT | Performed by: STUDENT IN AN ORGANIZED HEALTH CARE EDUCATION/TRAINING PROGRAM

## 2024-12-10 PROCEDURE — 95250 CONT GLUC MNTR PHYS/QHP EQP: CPT | Performed by: STUDENT IN AN ORGANIZED HEALTH CARE EDUCATION/TRAINING PROGRAM

## 2024-12-10 ASSESSMENT — FIBROSIS 4 INDEX: FIB4 SCORE: 3.32

## 2025-01-02 ENCOUNTER — TELEPHONE (OUTPATIENT)
Dept: PHARMACY | Facility: MEDICAL CENTER | Age: 75
End: 2025-01-02
Payer: MEDICARE

## 2025-01-02 NOTE — TELEPHONE ENCOUNTER
Prior Authorization has been submitted via Cover My Meds: Key ([id number])    Will follow up in 24-48 business hours.   Received Renewal PA request via  TRX   for Freestyle Lite test strips. (Quantity:100, Day Supply:25)     Insurance: RX [employer]  Member ID:  [id number]  BIN: [id number]   PCN: [id number]  Group: [id number]     Ran Test claim via [place] & medication Rejects stating prior authorization is required.    Vale Doty, Pharmacy Liaison/ RX Coordinator

## 2025-01-08 NOTE — TELEPHONE ENCOUNTER
Prior Authorization for Freestyle Lite Test Strip  has been APPROVED  Prior Authorization reference number: [id number]  Effective dates: 1/2/2025-1/2/2026  Copay: $50  Is patient eligible to fill with [facility]? Yes  Next Steps: Patient is refilling prescription at preferred pharmacy ([id number] [place], [place]).     Thank you,  Mar Pinzon [facility]  Endocrinology Pharmacy Coordinator

## 2025-01-22 DIAGNOSIS — I10 ESSENTIAL HYPERTENSION: ICD-10-CM

## 2025-01-23 RX ORDER — DILTIAZEM HYDROCHLORIDE 120 MG/1
120 CAPSULE, COATED, EXTENDED RELEASE ORAL DAILY
Qty: 90 CAPSULE | Refills: 1 | Status: SHIPPED | OUTPATIENT
Start: 2025-01-23

## 2025-01-23 NOTE — TELEPHONE ENCOUNTER
Is the patient due for a refill? Yes    Was the patient seen the last 15 months? Yes    Date of last office visit: 7/31/24    Does the patient have an upcoming appointment?  No       Provider to refill:HL    Does the patient have half-way Plus and need 100-day supply? (This applies to ALL medications) Patient does not have SCP

## 2025-02-07 ENCOUNTER — TELEPHONE (OUTPATIENT)
Dept: ENDOCRINOLOGY | Facility: MEDICAL CENTER | Age: 75
End: 2025-02-07
Payer: MEDICARE

## 2025-02-07 NOTE — TELEPHONE ENCOUNTER
Called Pt and left a voicemail reminding them of their appt on 02/19/25 and that they have labs due prior to their appt. I left a call back number in case they had any questions or concerns.

## 2025-02-15 NOTE — PROGRESS NOTES
Follow up Endocrinology Visit  Last visit with Jerome Seymour on: 2/20/24  Last visit with me on: 12/10/24    Chief complaint:  Artem Joseph, 74 y.o., male, who is here for follow up for hypogonadism, hypothyroidism, adrenal insufficiency, T2DM management.     Interval history:   - struggles with L shoulder pain, reports feeling more fatigued as well  - currently does no use CGM  - reviewed most recent labs  Prior notes:  7/18/24  - since last visit had multiple hypoglycemic episodes -> stopped Tresiba and Metformin, has excellent glycemic control  - tolerates Mounjaro well  - weight is stable  9/30/24  - overall doing well, noted fatigue and decreased sexual drive with  being off testosterone  - has an excellent glycemic control  - reviewed recent labs and imaging    Current therapy:  Levothyroxine 150 mcg  Hydrocortisone 20/10 mg(2 pm)  Mounjaro 5 mg  Testosterone 160 mg q2 wks? - recommended but it seems he was doing 1 ml biweekly     Tolerates medications: well  Compliant: yes    Previously used therapy:  Metformin - was well tolerated  Tresiba - stopped due to hypoglycemia after starting on Mounjaro  Jardiance 25 mg - no side effects    BG control:  CGM type - DexCom G7  Period -  7/5/24 - 7/18/24 -> 9/17/24 - 9/30/24 -> no recent data  Data were reviewed and discussed with the patient  Active time - 86 -> 71%  ABG - 109 +/- 21  -> 121 +/- 24 mg/dl  GMI - 5.9 -> N/A%  TIR - 99 -> 98%  TAR - high < 1 -> 2%, very high - 0 -> 0%  TBR - low < 1-> 0%, very low - 0%  9/17/24 - 9/30/24 7/5/24 - 7/18/24      DM history:  - diagnosed in 2023, presented with symptomatic hyperglycemia, HbA1C - 14%, preceeded by pre-diabetes  - weight at the time of the presentation - 195 lb (BMI = 26)  - hospitalizations for DKA/HHS/severe hyperglycemia/severe hypoglycemia in the past: none  - prior therapy: Metformin, Jardiance, Tresiba -> added Mounjaro     Hypothyroidism:  - Hx of recurrent melanoma treated in the past with  "Nivolimab in 2016 -> around that time he was diagnosed with hypothyroidism   - on chronic therapy with LT4    Primary hypogonadism:  - diagnosed based on low testosterone and elevated LH level in 2018  - had postatectomy due to BPH, negative for cancer  - on long-term replacement testosterone therapy    Polycythemia  - previously was doing phlebotomy but didn't like the procedure as felt drawn  after that  COPD, on O2  JOHN was ruled out  - followed by pulmonologist    Hx of secondary adrenal insufficiency:  - developed due to use high dose steroids for melanoma chemotherapy vs secondary Nivolimab therapy    Osteoporosis  S/p compression fracture of L1 - 2022  - hx of use of high dose steroids, primary hypogonadism  - fracture after lifting ~ 100 lb    Current Medications:  Current Outpatient Medications:     DILTIAZem CD (CARDIZEM CD) 120 MG CAPSULE SR 24 HR, Take 1 Capsule by mouth every day., Disp: 90 Capsule, Rfl: 1    rivaroxaban (XARELTO) 20 MG Tab tablet, Take 1 Tablet by mouth with dinner., Disp: 90 Tablet, Rfl: 2    MOUNJARO 5 MG/0.5ML Solution Pen-injector, 5 mg every 7 days., Disp: , Rfl:     hydroCHLOROthiazide 12.5 MG tablet, Take 1 Tablet by mouth every day., Disp: 90 Tablet, Rfl: 4    SYRINGE-NEEDLE, DISP, 3 ML (B-D INTEGRA SYRINGE) 23G X 1\" 3 ML Misc, Use every 2 weeks for testosterone injection, Disp: 10 Each, Rfl: 11    vitamin D2, Ergocalciferol, (DRISDOL) 1.25 MG (65135 UT) Cap capsule, Take 1 Capsule by mouth every 7 days., Disp: 12 Capsule, Rfl: 1    simvastatin (ZOCOR) 20 MG Tab, Take 1 Tablet by mouth every morning., Disp: 90 Tablet, Rfl: 3    flecainide (TAMBOCOR) 100 MG Tab, Take 1 Tablet by mouth 2 times a day., Disp: 180 Tablet, Rfl: 3    hydrocortisone (CORTEF) 10 MG Tab, Take 20 mg in am and 10 mg at pm, in case of sickness - double dose of the medication x 3 days, Disp: 320 Tablet, Rfl: 5    TRELEGY ELLIPTA 100-62.5-25 MCG/ACT inhaler, INHALE 1 PUFF BY MOUTH AND INTO THE LUNGS ONCE " DAILY, Disp: 180 Each, Rfl: 0    Tirzepatide (MOUNJARO) 2.5 MG/0.5ML Solution Pen-injector, Inject 2.5 mg under the skin every 7 days. (Patient not taking: Reported on 12/10/2024), Disp: 6 mL, Rfl: 11    HYDROcodone-acetaminophen (NORCO) 5-325 MG Tab per tablet, Take 1 Tablet by mouth 3 times a day., Disp: , Rfl:     sildenafil citrate (VIAGRA) 100 MG tablet, 0.5-1 tab po prn, 45 minutes prior to desired erection and on empty stomach, Disp: , Rfl:     levothyroxine (SYNTHROID) 150 MCG Tab, Take 1 Tablet by mouth every day., Disp: 90 Tablet, Rfl: 4    Blood Glucose Monitoring Suppl Device, Insurance preference, Disp: 1 Each, Rfl: 1    Blood Glucose Test Strips, Test strips order: Test strips for Insurance preference meter. Sig: use 4 and prn ssx high or low sugar #100 RF x 3, Disp: 400 Strip, Rfl: 11    Lancets, Lancets order: Lancets for Insurance preference meter. Sig: use 4 and prn ssx high or low sugar. #100 RF x 3, Disp: 100 Each, Rfl: 11    BD PEN NEEDLE SHAWN U/F, For use with 2-3 daily insulin and or GLP-1 injection., Disp: 400 Each, Rfl: 3    albuterol 108 (90 Base) MCG/ACT Aero Soln inhalation aerosol, Inhale 2 Puffs every 6 hours as needed for Shortness of Breath., Disp: 1 Each, Rfl: 11    acetaminophen (TYLENOL) 325 MG Tab, acetaminophen 325 mg tablet  take 2 tablets by mouth three times a day if needed for pain for 5 days, Disp: , Rfl:     omeprazole (PRILOSEC) 20 MG CPDR, Take 20 mg by mouth every day., Disp: , Rfl:     PHYSICAL EXAM:   Vital signs: /78   Pulse 91   Ht 1.829 m (6')   Wt 93.2 kg (205 lb 6.4 oz)   SpO2 95%   BMI 27.86 kg/m²   GENERAL: Well-developed, well-nourished  in no apparent distress. Noted facial erythema.   CARDIOVASCULAR: Regular rate and rhythm.   LUNGS: No dyspnea  ABDOMEN: Soft, nondistended  EXTREMITIES: No clubbing, cyanosis, or edema.   NEUROLOGICAL: Cranial nerves II-XII are grossly intact No visible tremor with both outstretched hands  SKIN: No rashes, lesions.  Turgor is normal.    Labs:  - reviewed  Hypogonadism work up:   Reference Range 07/23/18 07:07 10/01/18 07:20 11/14/18 07:06 04/25/22 07:36 04/10/23 10:00 02/07/24 08:06 7/8/24  13:27 08/20/24  07:21 9/20/24  07:44 12/10/24  2:34  2/5/25  11:28   Hb 13.0 - 17.7 g/dL  17.8 (H) 18.4 (H) 17.7 16.4 18.0 (H) 17.8 (H) 17.3 (H) 16.9 17.2 (H) 15.7   Hct 37.5 - 53.0 %  53.7 (H) 54.4 (H) 53.3 (H) 49.4 54.9 (H) 55.2 (H) 52.4 50.4 52.4 48.1   PSA, total 0.0 - 4.0 ng/mL    3.3          Testosterone,Total 264 - 916 ->250 - 1100  911  309   677  183 (L) 226 (L) 201 (L)   Testosterone, total LM/ - 916 ng/dL    309 248.1 (L)         SHBG nmol/L    42.1      35 31.4   % Free Testosterone %    0.8          Free Testosterone 6.6 - 18.1 -> 6 -  73 3.0 (L) 14.3     134.1  27.5 (L) 27.3    Bioavailable testosterone 15 - 150          51.4    Estradiol 7.6 - 42.6 pg/mL  44.3 (H) 25.8 18.8          LH 1.7 - 8.6 mIU/mL 15.8 (H)             FSH 1.5 - 12.4 mIU/mL              ACTH 7.2 - 63.3 pg/mL    <1.5 (L) <1.5 (L)         Cortisol               IGF-1 (Somat) 59 - 230 ng/mL    153          Testosterone  Rx Mg q2 weeks       160 mg off off Off? Missed dose     Hypothyroidism therapy monitoring:   Reference Range 09/11/17  07/23/18  01/02/20  04/25/22  04/10/23  12/14/23  02/07/24  7/8/24   TSH 0.450 - 4.500 uIU/mL 2.410 1.660 3.500 2.440 1.630 2.350 2.150 1.83   fT4 0.82 - 1.77 ng/dL 0.83 1.53 1.34 1.72 2.02 (H) 1.72 1.82 (H) 1.38   LT4 Mcg/day        150     HbA1C/BG/Hb:   Reference Range 11/8/23 02/20/24 7/18/24 12/10/24   HbA1C 5.8 % 14 ! 8.4 ! 5.9 5.3       Reference Range 7/8/24   Hb 13.0 - 17.7 g/dL 17.8 (H)   Hct 37.5 - 51.0 % 55.2 (H)     C-peptide/glucose/T1DM Abs:   Reference Range  12/14/23   Glucose 70 - 99 mg/dL 130 (H)   C-Peptide 1.1 - 4.4 ng/mL 1.0 (L)   IA-2 Abs U/mL <7.5   Feroz-65 Abs 0.0 - 5.0 U/mL <5.0     Kidney function/MACR:   Reference Range  12/14/23 02/07/24 12/2/24   Creatinine 0.76 - 1.27 mg/dL 1.02 0.99  1.10   MACR 0 - 29 mg/g creat 27  71     LFTs/FIB-4:   Reference Range 12/2/24   Platelet Count 150 - 450 10*3/uL 133   AST(SGOT) 0 - 40 IU/L 30   ALT(SGPT) 0 - 44 IU/L 19    ALP 44 - 121 IU/L 123   FIB-4 < 2.0      Lipid panel:   Reference Range 12/14/23    Triglycerides 0 - 149 mg/dL 113   HDL >39 mg/dL 67   LDL Chol Calc (NIH) 0 - 99 mg/dL 60     Osteoporosis work up:   Reference Range 02/07/24 9/2/24 12/2/24   Sodium 134 - 144 mmol/L 142 145 143   Creatinine 0.76 - 1.27 mg/dL 0.99 1.1 1.1   GFR > 60  71 71   Calcium 8.6 - 10.2 mg/dL 9.8 9.7 10.1   AST(SGOT) 0 - 40 IU/L 23 34 30   ALT(SGPT) 0 - 44 IU/L 20 29 19    ALP 44 - 121 IU/L 134 (H) 164 (H) 123   BS-ALP < 14.9???  26.7    Vit D 30.0 - 100.0 ng/mL 47.7 40.9    TSH 0.450 - 4.500 uIU/mL 2.150     PN1P   73    CTX   588      24 hr urine:   Reference Range 9/20/24 12/2/24   Total volume  1700 ml 1200 ml   Creatinine 0.8 - 2.0 g/day 1.9 1.7   Calcium  < 300 mg/24 h 454 (H) 348 (H)     Imaging:  - reviewed  DEXA scan:  Date Machine L1- L4  BMD/ T-score LFN  BMD/ T-score RFN  BMD/ T-score FRAX Rx    9/2/22   1.129 / - 0.8  0.855 / - 1.7  0.788 / - 2.2       Brain MRI on 11/26/19:  INDICATIONS: Hx of melanoma scalp and neck  IMPRESSION:  1.  No evidence of intracranial metastases.  2.  Mild chronic microvascular ischemic type changes.  3.  No acute intracranial pathology.  4.  Partially visualized scarring within the suboccipital soft tissues.    ASSESSMENT/PLAN:   # Primary testicular hypogonadism  - most recent data was done while patient was off testosterone  Prior notes:  7/18/24  - diagnosed based on low testosterone and elevated LH level in 2018  - had postatectomy due to BPH, negative for cancer  - on long-term replacement testosterone therapy, currently on IM testosterone 160 mg q2 week  - states that testosterone effect lasts long enough, midcycle testosterone level is in a mid range  - main concern - polycythemia - discussed risks associated with that  -  will hold testosterone until Hct < 54 -> resume at lower dose   9/30/24  - polycythemia resolved  - has symptomatic low testosterone level  - resume testosterone replacement therapy at lower dose  Plan:  Resume testosterone cypionate at 120 mg (0.6 ml) q 2 weeks  Repeat testosterone panel midcycle, CBC.     - Testosterone, Free & Total, Adult Male (w/SHBG); Future  - PSA TOTAL W/FREE PSA REFLEX; Future  - CBC WITHOUT DIFFERENTIAL; Future    # Polycythemia, resolved after holding testosterone therapy  - no evidence of polycythemia on current labs  Prior notes:  7/18/24  - could be multifactorial  - testosterone therapy is contributory  - denies JOHN  - also noted chronic thrombocytopenia -> recommended to discuss with PCP vs hematology referral to rule out hematologic disease  - previously had phlebotomy but didn't like the procedure as felt drawn after that  9/30/24  - will resume testosterone therapy at lower dose  Plan:  Resume testosterone therapy at lower dose as discussed above  Repeat Hct level.   Discuss further work up with PCP, consider hematology consult    # Osteoporosis  # Compression fracture of lumbar vertebra, unspecified lumbar vertebral level, sequela  # Hypercalciuria  - he couldn't tolerate HCTZ due to polyuria -> stopped the medication  - would recommend to proceed with good hydration, cut down on salt intake, repeat 24 hr urine Ca  - due for the DEXA scan  Prior notes:  7/18/24  - not addressed this issue today  - due for another DEXA scan  - will also get 24 hr urine Ca to rule out hypercalciuria and Ca malabsorption  - needs pharmacologic therapy given previous fragility vertebral fracture  9/30/24  - work up for secondary causes revealed hypercalciuria and ALP elevation  - on DEXA scan BMD at hips consistent with osteopenia  - discussed management of hypercalciuria: Na restriction (pt is afraid that it would be challenging for him as he eats precooked dinner), HCTZ  - as patient already have  soft BP on current antihypertensive medication -> stop of one of CCB (should not be on 2 medications from the same class), start on low dose of HCTZ, consider increasing the dose and decreasing the dose of another CCB  Plan:  Na restriction diet.   Adequate hydration.   Stop felopidine.   Ok to stop HCTZ  Repeat CMP, 24 hr urine Ca/creatinine.  Fall precautions.   Further discussion about pharmacologic management of the osteoporosis.   - URINE CREATININE 24 HR; Future  - Urine Calcium 24 HR or Random; Future  - URINE SODIUM, 24 HR; Future  - VITAMIN D,25 HYDROXY (DEFICIENCY); Future  - CTX COLLAGEN TYPE 1; Future  - PROCOLLAGEN 1(O1NP); Future  - DEXA    # Adrenal insufficiency (HCC)  - overall doing well on current therapy, weight is stable, no cushingoid features  - attempt to decrease dose of HC  - will assess own cortisol secretion by skipping evening dose of HC and taking morning HC only after am blood draw for cortisol  Prior notes:  7/18/24  - either secondary to nivolumab therapy vs steroid use  - on replacement therapy with HC 20/10 at 10 am (wakes up) and 5 pm, no clear Cushingoid features, has facial erythema but likely to be related to polycythemia  9/30/24  - attempt to taper down dose of HC to 25 -> 20 ->15 as tolerated, consider retesting for AI on upcoming visits  Plan:  Decrease hydrocortisone: 10 am - 20 -> 15 mg, 2 pm -> 10 mg, if well tolerated -> 15/5 -> if well tolerated 10/5  Discussed sick day rules.   3.   Will order Solu-Cortef for emergency situations.  4.   Check 8 am cortisol after skipping pm dose of HC night before and am HC on the day of blood draw.    - ACTH; Future  - CORTISOL; Future  - DHEA SULFATE; Future    # Hypothyroidism, acquired  - doing well  - will repeat TFTs prior the next appointment  Prior notes:  12/10/24  - likely nivolumab PD-1/PD-L1 inhibitor - induced, as onset was corresponding with that therapy  - well controlled with LT4 replacement therapy - 150 mcg vs weight  base calculated dose ~ 152 mcg/day  Plan:  Continue Levothyroxine 150 mcg/day  Repeat TFTs    # Type 2 diabetes mellitus without complication  - duration x 2 years  - on SGLT-2 inhibitor, GLP-1/GIP agonist  - HbA1C  - 14% (11/2023) -> 8.4% (2/2024) -> 5.9% (7/2024) -> 5.3% (12/2024)  - has preserved insulin secretion: 12/2023 - C-peptide - 1.0, glucose - 130; negative T1DM Abs - ROBB 65, IA-2 Abs    Microvascular complications: denies peripheral neuropathy, nephropathy, retinopathy  Macrovascular complications:  denies CAD, CVA, PAD  Associated comorbidities: dyslipidemia, paroxysmal Afib, Hx of metastatic melanoma, GERD, HTN, primary hypogonadism, secondary adrenal insufficiency, hypothyroidism, polycythemia, osteoporosis, s/p prostatectomy for BPH with compressive symptoms     DM complication screening:  Labs reviewed:  MACR - neg, last checked in 12/2023  Creat/GFR wnl, last checked in 2/2024  LDL - 60 - at target, last checked in 12/2023     Patient is taking statin: on Simvastatin 20 mg  Patient is taking ASA: no, on Xarelto  Patient is taking ACE/ARB: no     Last eye exam: 6/2024, denies DR  Last foot exam: follows podiatrist - q8 weeks, normal monofilament test, denies tingling, numbness, burning sensation, lesions, reports significant callus on his L foot -> taken care of     Home medications:  Mounjaro 5 mg     Discussion:  - excellent control on current regimen  Prior notes:  7/18/24  - patient has recently diagnosed T2DM with preserved insulin secretion  - agree with stopping basal insulin and Metformin  - has excellent glycemic control while on SGLT-2 inhibitor and GLP-1/GIP agonist, ok to simplify the regimen for GLP-1/GIP agonist  9/30/24  -  again, congratulated patient with excellent glycemic control    Plan:  Discussed with the patient goals for DM management:  Fasting BG 90 - 130  2 hrs postprandial  - 180  HbA1C < 7.0%     Therapy adjustments:  - continue Mounjaro 5 mg weekly    3. Continue  using CGM - DexCom G7 intermittently - in between - SBGM  4. Given instructions for foot care.   5. Continue following podiatrist to address L foot callus.   6. Obtain following:   - Comp Metabolic Panel; Future  - HEMOGLOBIN A1C; Future  - MICROALBUMIN CREAT RATIO URINE; Future    # Dyslipidemia  - on medium intensity statin  - LDL is at goal  - continue current regimen  - Lipid Profile; Future      RTC: 2 mo    Total time (face-to-face and non-face-to face time):  40 min - extensive discussion of multiple health issues, diagnoses, treatment, prognosis, medical charts, lab review, documentation.     Plan reviewed with the patient and agreed with plan.  All questions answered to patient's satisfaction.  Thank you kindly for allowing me to participate in the diabetes care plan for this patient.    Joseline Bhatt MD    CC:   Mari Jenkins P.A.-C.   1) Please continue Topamax 50mg PO BID for now with plans to increase to 100mg PO BID in 3 days   2) Continue Effexor XR to 150mg PO daily  3) Please initiate Gabapentin 300mg PO TID and Trazodone 100mg PO QHS  4) Pt is agreeable to enter in-pt rehab and social work to coordinate this effort  5) Discontinue 1:1 as pt has been behaviorally controlled and compliant with medications 1) Please continue Topamax 50mg PO BID for now with plans to increase to 100mg PO BID in 3 days   2) Continue Effexor XR to 150mg PO daily and Gabapentin 300mg PO TID.  3) Please initiate Trazodone 100mg PO QHS  4) Pt is agreeable to enter in-pt rehab and social work to coordinate this effort  5) Discontinue 1:1 as pt has been behaviorally controlled and compliant with medications

## 2025-02-19 ENCOUNTER — OFFICE VISIT (OUTPATIENT)
Dept: ENDOCRINOLOGY | Facility: MEDICAL CENTER | Age: 75
End: 2025-02-19
Attending: STUDENT IN AN ORGANIZED HEALTH CARE EDUCATION/TRAINING PROGRAM
Payer: MEDICARE

## 2025-02-19 VITALS
BODY MASS INDEX: 27.82 KG/M2 | OXYGEN SATURATION: 95 % | SYSTOLIC BLOOD PRESSURE: 116 MMHG | WEIGHT: 205.4 LBS | HEART RATE: 91 BPM | DIASTOLIC BLOOD PRESSURE: 78 MMHG | HEIGHT: 72 IN

## 2025-02-19 DIAGNOSIS — E55.9 VITAMIN D DEFICIENCY: ICD-10-CM

## 2025-02-19 DIAGNOSIS — T38.0X5A STEROID-INDUCED OSTEOPOROSIS: ICD-10-CM

## 2025-02-19 DIAGNOSIS — S32.000S COMPRESSION FRACTURE OF LUMBAR VERTEBRA, UNSPECIFIED LUMBAR VERTEBRAL LEVEL, SEQUELA: ICD-10-CM

## 2025-02-19 DIAGNOSIS — E29.1 PRIMARY TESTICULAR HYPOGONADISM: ICD-10-CM

## 2025-02-19 DIAGNOSIS — R82.994 HYPERCALCIURIA: ICD-10-CM

## 2025-02-19 DIAGNOSIS — Z79.4 TYPE 2 DIABETES MELLITUS WITHOUT COMPLICATION, WITH LONG-TERM CURRENT USE OF INSULIN (HCC): ICD-10-CM

## 2025-02-19 DIAGNOSIS — D75.1 POLYCYTHEMIA, SECONDARY: ICD-10-CM

## 2025-02-19 DIAGNOSIS — M81.8 STEROID-INDUCED OSTEOPOROSIS: ICD-10-CM

## 2025-02-19 DIAGNOSIS — D75.1 POLYCYTHEMIA: ICD-10-CM

## 2025-02-19 DIAGNOSIS — E03.9 HYPOTHYROIDISM, ACQUIRED: ICD-10-CM

## 2025-02-19 DIAGNOSIS — E27.40 ADRENAL INSUFFICIENCY (HCC): ICD-10-CM

## 2025-02-19 DIAGNOSIS — E78.5 DYSLIPIDEMIA: ICD-10-CM

## 2025-02-19 DIAGNOSIS — E29.1 HYPOGONADISM IN MALE: ICD-10-CM

## 2025-02-19 DIAGNOSIS — E11.9 TYPE 2 DIABETES MELLITUS WITHOUT COMPLICATION, WITH LONG-TERM CURRENT USE OF INSULIN (HCC): ICD-10-CM

## 2025-02-19 PROCEDURE — 99211 OFF/OP EST MAY X REQ PHY/QHP: CPT | Performed by: STUDENT IN AN ORGANIZED HEALTH CARE EDUCATION/TRAINING PROGRAM

## 2025-02-19 ASSESSMENT — FIBROSIS 4 INDEX: FIB4 SCORE: 3.32

## 2025-02-19 NOTE — PATIENT INSTRUCTIONS
Do blood work around 8 am in the morning, hold on hydrocortisone before blood work, take it after, also skip evening dose of hydrocortisone the day before.     2.  Drink more water.     3.  Avoid falls.     4. Check your sugars intermittently.     5. Bring your syringe for testosterone again.

## 2025-02-21 DIAGNOSIS — E29.1 HYPOGONADISM IN MALE: ICD-10-CM

## 2025-02-24 RX ORDER — TESTOSTERONE CYPIONATE 200 MG/ML
INJECTION, SOLUTION INTRAMUSCULAR
Qty: 6 ML | Refills: 1 | Status: SHIPPED | OUTPATIENT
Start: 2025-02-24 | End: 2025-05-19

## 2025-02-26 DIAGNOSIS — E55.9 VITAMIN D DEFICIENCY: ICD-10-CM

## 2025-02-26 RX ORDER — ERGOCALCIFEROL 1.25 MG/1
50000 CAPSULE, LIQUID FILLED ORAL
Qty: 12 CAPSULE | Refills: 1 | Status: SHIPPED | OUTPATIENT
Start: 2025-02-26

## 2025-03-04 NOTE — PROGRESS NOTES
HPI: Maykel is a 72 year old male here to establish care for the following issues       1. Primary testicular hypogonadism  Well documented low testosterone and elevated LH levels  Testosterone is helpful with normal libido, and energy levels  Recent proctectomy due to enlarge prostate-neg for cancer     Current testosterone replacement-testosterone cypionate 1mls every 14 days  Does his blood work in between injections   Denies fatigue, muscle, brain fogginess, weight loss                   FV by [facility] for Flomax appt with urologist next month         2. Erythrocytosis:   Normal testosterone levels  History of COPD with nocturnal oxygen-2L   Sleep apnea has been ruled out          3. Hypothyroidism, acquired:   Clinically euthyroid taking levothyroxine 150 mcg/day.    Taking his thyroid hormone replacement prior to breakfast, on an empty stomach  Denies taking iron, calcium, antiacids   Denies taking biotin or multivatims     Denies palpitation, tremors, fatigue, muscle, brain fogginess, weight loss           4. Adrenal insufficiency (HCC)  Diagnosed condition by Dr. Manrique, possibly related to previous metastatic melanoma for which he took, steroids for more than 6 months  Currently taking hydrocortisone 20 mg in the morning and 20 mg in the afternoon  He takes an extra 10 mg dose for the stress  Denies cushingoid symptoms     Denies fatigue, muscle weakness, brain fogginess, weight loss              5.  Steroid-induced osteopenia:  The presumptive diagnosis based on  #5 above.      Bone density scan on 10/21/2022 showed  A lumbar T score of -0.8, a right femur T score of -2.2            6.  Vitamin D deficiency:  Currently taking ergocalciferol 1.25 mg weekly             7. Type 2 diabetes mellitus without complication, with long term use of insulin:   Reports polydipsia, polyphagia, polyuria             Allergies:   Allergies   Allergen Reactions    Sulfa Drugs      Severe muscle pains       Current  "medicines including changes today:  Current Outpatient Medications   Medication Sig Dispense Refill    fluticasone-umeclidinium-vilanterol (TRELEGY ELLIPTA) 100-62.5-25 mcg/act inhaler Inhale 1 Puff every day. 3 month supply x 1 year 90 Each 3    albuterol 108 (90 Base) MCG/ACT Aero Soln inhalation aerosol Inhale 2 Puffs every 6 hours as needed for Shortness of Breath. 1 Each 11    levothyroxine (SYNTHROID) 150 MCG Tab Take 1 Tablet by mouth every day. 90 Tablet 4    SYRINGE-NEEDLE, DISP, 3 ML (B-D INTEGRA SYRINGE) 23G X 1\" 3 ML Misc 23 g every 14 days. 30 Each 11    vitamin D2, Ergocalciferol, (DRISDOL) 1.25 MG (04334 UT) Cap capsule Take 1 Capsule by mouth every 7 days. 12 Capsule 0    rivaroxaban (XARELTO) 20 MG Tab tablet Take 1 Tablet by mouth with dinner. 90 Tablet 3    DILTIAZem CD (CARDIZEM CD) 120 MG CAPSULE SR 24 HR Take 1 Capsule by mouth every day. 90 Capsule 3    flecainide (TAMBOCOR) 100 MG Tab Take 1 Tablet by mouth 2 times a day. 180 Tablet 3    felodipine ER (PLENDIL) 2.5 MG TABLET SR 24 HR Take 1 Tablet by mouth every day. 90 Tablet 3    simvastatin (ZOCOR) 20 MG Tab Take 1 Tablet by mouth every morning. 90 Tablet 3    traMADol (ULTRAM) 50 MG Tab 1 TABLET TWICE A DAY AS NEEDED QTY 60 FOR 30 DAY SUPPLY. DX: M47.816. FWD (Patient not taking: Reported on 9/28/2023)      hydrocortisone (CORTEF) 10 MG Tab Take 1 Tablet by mouth 4 times a day. 336 Tablet 4    nicotine (NICODERM) 14 MG/24HR PATCH 24 HR apply 1 patch TO CLEAN, DRY, AND INTACT SKIN REMOVE AND REPLACE once daily      acetaminophen (TYLENOL) 325 MG Tab acetaminophen 325 mg tablet   take 2 tablets by mouth three times a day if needed for pain for 5 days      omeprazole (PRILOSEC) 20 MG CPDR Take 20 mg by mouth every day.       No current facility-administered medications for this visit.        Past Medical History:   Diagnosis Date    Adrenal disorder (HCC)     Arrhythmia     Breath shortness     Cancer (HCC)     melanoma in 2007, original " 5 1989 from back of neck (chemo)    Cataract     Emphysema of lung (HCC)     Heart burn     Hypertension     Indigestion     Metastatic melanoma (HCC) 1990 / 2012    original neck lesion resected 1990 / recurrent lesion resected 2007 / third lesion resected 2012 /recurrence in 2016    Thyroid disease        PHYSICAL EXAM:    /64 (BP Location: Left arm, Patient Position: Sitting, BP Cuff Size: Adult)   Pulse 78   Ht 1.829 m (6')   Wt 88.5 kg (195 lb 1.6 oz)   SpO2 96%   BMI 26.46 kg/m²     Gen. overweight but otherwise appears healthy             No cushingoid features        ASSESSMENT AND RECOMMENDATIONS  1. Primary testicular hypogonadism  Clinically stable  Biochemically stable  Continue regimen see HPI  - testosterone cypionate (DEPO-TESTOSTERONE) 200 MG/ML injection; Inject 0.7 mL into the shoulder, thigh, or buttocks every 14 days for 90 days.  Dispense: 6 mL; Refill: 0  - Testosterone, Free & Total, Adult Male (w/SHBG); Future  - HEMOGLOBIN AND HEMATOCRIT; Future    2. Erythrocytosis  Unstable  Underlying sleep apnea  Follow by PCP and pulmonology     3. Hypothyroidism, acquired  - Clinically stable  - Biochemically stable  - Continue regimen-HPI   - TSH; Future  - FREE THYROXINE; Future  - Comp Metabolic Panel; Future  - levothyroxine (SYNTHROID) 150 MCG Tab; Take 1 Tablet by mouth every day.  Dispense: 90 Tablet; Refill: 4    4. Adrenal insufficiency (HCC)  Clinically Stable  Patient understands about her stress dose  Continue regimen-see HPI    5. Steroid-induced osteopenia  Stable  Continue regimen-HPI    6. Vitamin D deficiency  Unstable  Ergocalciferol 1.25 mg weekly sent to pharmacy for a 6-month supply  - vitamin D2, Ergocalciferol, (DRISDOL) 1.25 MG (28602 UT) Cap capsule; Take 1 Capsule by mouth every 7 days.  Dispense: 12 Capsule; Refill: 1    7. Type 2 diabetes mellitus without complication, with long-term current use of insulin (HCC)  Patient reported history of prediabetes on his last  appointment  An A1c was ordered on his blood work  A1c showed a 14.4% in blood work  Patient reports symptoms of polydipsia, polyphagia, polyuria  Discuss DM, risk of not treating DM    Medication regimen:  Metformin  mg twice a day, SE discussed-sent pharmacy  Jardiance 10mg daily sent - discussed to drink plenty of water-sent pharamkimberly Mcdonaldsiba 6 untis/night -sample    Samples to  [facility] pharmacy:  Tresiba and Jardiance     Pt will pick glucometer from pharmacy and check BG daily in the am  If glucometer is too expensive, patient will  from [employer] glucometer, test strips and lancets      - C-PEPTIDE; Future  - ROBB-65; Future  - IA-2 AUTOANTIBODIES  - MICROALBUMIN CREAT RATIO URINE; Future  - Lipid Profile; Future  - metFORMIN ER (GLUCOPHAGE XR) 500 MG TABLET SR 24 HR; Take 1 Tablet by mouth 2 times a day.  Dispense: 180 Tablet; Refill: 11  - Empagliflozin (JARDIANCE) 10 MG Tab tablet; Take 1 Tablet by mouth every day.  Dispense: 90 Tablet; Refill: 4  - Blood Glucose Monitoring Suppl Device; Insurance preference  Dispense: 1 Each; Refill: 1  - Blood Glucose Test Strips; Test strips order: Test strips for Insurance preference meter. Sig: use 4 and prn ssx high or low sugar #100 RF x 3  Dispense: 400 Strip; Refill: 11  - Lancets; Lancets order: Lancets for Insurance preference meter. Sig: use 4 and prn ssx high or low sugar. #100 RF x 3  Dispense: 100 Each; Refill: 11  - Empagliflozin (JARDIANCE) 10 MG Tab tablet; Take 1 Tablet by mouth every day.  Dispense: 28 Tablet; Refill: 0  - Insulin Degludec (TRESIBA FLEXTOUCH) 200 UNIT/ML Solution Pen-injector; Inject 6 Units under the skin at bedtime  Dispense: 3 mL; Refill: 5  - BD PEN NEEDLE SHAWN U/F; For use with 2-3 daily insulin and or GLP-1 injection.  Dispense: 400 Each; Refill: 3         Disposition: Make an appointment to follow-up in December  Do your blood work 2 weeks before your next appointment        Jerome Seymour,  MARII  11/21/23    My total time spent caring for the patient on the day of the encounter was 53 minutes, between precharting, discussing diagnosis and with patient, medications, side effects.  This does not include time spent on separately billable procedures/tests.

## 2025-03-21 DIAGNOSIS — E03.9 HYPOTHYROIDISM, ACQUIRED: ICD-10-CM

## 2025-03-21 RX ORDER — LEVOTHYROXINE SODIUM 150 UG/1
150 TABLET ORAL DAILY
Qty: 90 TABLET | Refills: 4 | Status: SHIPPED | OUTPATIENT
Start: 2025-03-21

## 2025-03-21 NOTE — TELEPHONE ENCOUNTER
Received request via: Pharmacy    Was the patient seen in the last year in this department? Yes    Does the patient have an active prescription (recently filled or refills available) for medication(s) requested? No    Pharmacy Name: [facility] #[id number]    Does the patient have longterm Plus and need 100-day supply? (This applies to ALL medications) Patient does not have SCP

## 2025-07-22 DIAGNOSIS — I10 ESSENTIAL HYPERTENSION: ICD-10-CM

## 2025-07-23 RX ORDER — DILTIAZEM HYDROCHLORIDE 120 MG/1
120 CAPSULE, COATED, EXTENDED RELEASE ORAL DAILY
Qty: 90 CAPSULE | Refills: 0 | Status: SHIPPED | OUTPATIENT
Start: 2025-07-23

## 2025-07-23 NOTE — TELEPHONE ENCOUNTER
Is the patient due for a refill? Yes    Was the patient seen the last 15 months? Yes    Date of last office visit: 07.31.2025    Does the patient have an upcoming appointment?  No    Provider to refill:HL    Does the patient have alf Plus and need 100-day supply? (This applies to ALL medications) Patient does not have SCP      [phone]

## 2025-07-28 DIAGNOSIS — I48.0 PAROXYSMAL ATRIAL FIBRILLATION (HCC): ICD-10-CM

## 2025-07-28 ASSESSMENT — ENCOUNTER SYMPTOMS
PND: 0
ORTHOPNEA: 0
SYNCOPE: 0
NEAR-SYNCOPE: 0
DIZZINESS: 0
LIGHT-HEADEDNESS: 0
PALPITATIONS: 0
HEADACHES: 0
SHORTNESS OF BREATH: 0
DYSPNEA ON EXERTION: 0

## 2025-07-28 NOTE — PROGRESS NOTES
Chief Complaint   Patient presents with    Atrial Fibrillation    Hypertension          Subjective:   Artem Joseph is a 74 y.o. male who presents today for follow-up.     Previous patient of Dr. Ramirez.  Current medical problems include PAF with chronic anticoagulation, tobacco abuse, HTN and diabetes. Their last clinic visit was 7/31/2024 with myself.     7/31/2024 visit:  Patient is doing overall well from a cardiac perspective. He has no chest pain, shortness of breath, edema, orthopnea, PND, dizziness/lightheadedness, or palpitations. Patient denies any reoccurrences of atrial fibrillation. 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 Patient no longer drinks coffee due to increasing his palpitations.    Patient has COPD and wears oxygen and follows with pulmonology.     Today's visit:  Patient reports that he is doing overall well from a cardiac perspective.   He has no chest pain, shortness of breath, edema, orthopnea, PND, dizziness/lightheadedness, or palpitations. He reports that he did not sleep well last night but denies any other symptoms.  He has been working with his endocrinologist to get his diabetes well-managed and has gotten his blood sugar is controlled.  He he is watching his diet and tries to be as active as he can.  He continues to wear 2 L at night.  Since last follow-up he has decreased his smoking and is now down to less than a pack of cigarettes a day.  He does not drink coffee as that he finds that the only trigger that makes him feel palpitations.      Cardiovascular Risk Factors:  1. Smoking status: Current smoker  2. Type II Diabetes Mellitus: Yes   Lab Results   Component Value Date/Time    HBA1C 5.3 12/10/2024 01:49 PM    HBA1C 5.8 07/18/2024 10:34 AM     3. Hypertension: Yes  4. Dyslipidemia: Yes   Cholesterol,Tot   Date Value Ref Range Status   12/14/2023 147 100 - 199 mg/dL Final     LDL   Date Value Ref Range  Status   12/05/2016 70 0 - 99 mg/dL Final     HDL   Date Value Ref Range Status   12/14/2023 67 >39 mg/dL Final     Triglycerides   Date Value Ref Range Status   12/14/2023 113 0 - 149 mg/dL Final     Past Medical History:   Diagnosis Date    Adrenal disorder (HCC)     Arrhythmia     Breath shortness     Cancer (HCC)     melanoma in 2007, original 1989 from back of neck (chemo)    Cataract     Emphysema of lung (HCC)     Heart burn     Hypertension     Indigestion     Metastatic melanoma (HCC) 1990 / 2012    original neck lesion resected 1990 / recurrent lesion resected 2007 / third lesion resected 2012 /recurrence in 2016    Thyroid disease          Family History   Problem Relation Age of Onset    Heart Disease Mother     Cancer Father     Heart Disease Maternal Grandmother     Heart Disease Maternal Grandfather     Cancer Paternal Grandfather          Social History     Tobacco Use    Smoking status: Every Day     Current packs/day: 1.00     Average packs/day: 1 pack/day for 40.0 years (40.0 ttl pk-yrs)     Types: Cigarettes     Passive exposure: Past    Smokeless tobacco: Never    Tobacco comments:     did vape, but has not quit 10/2019, 1/2-1 PPD   Vaping Use    Vaping status: Former    Substances: Nicotine    Passive vaping exposure: Yes   Substance Use Topics    Alcohol use: Yes     Comment: 1 drink per month     Drug use: No         Allergies   Allergen Reactions    Sulfa Drugs      Severe muscle pains         Current Outpatient Medications   Medication Sig    DILTIAZem CD (CARDIZEM CD) 120 MG CAPSULE SR 24 HR TAKE 1 CAPSULE BY MOUTH EVERY DAY    levothyroxine (SYNTHROID) 150 MCG Tab Take 1 Tablet by mouth every day.    vitamin D2, Ergocalciferol, (DRISDOL) 1.25 MG (92871 UT) Cap capsule TAKE 1 CAPSULE BY MOUTH EVERY 7 DAYS    rivaroxaban (XARELTO) 20 MG Tab tablet Take 1 Tablet by mouth with dinner.    MOUNJARO 5 MG/0.5ML Solution Pen-injector 5 mg every 7 days.    hydroCHLOROthiazide 12.5 MG tablet Take 1  "Tablet by mouth every day.    SYRINGE-NEEDLE, DISP, 3 ML (B-D INTEGRA SYRINGE) 23G X 1\" 3 ML Misc Use every 2 weeks for testosterone injection    simvastatin (ZOCOR) 20 MG Tab Take 1 Tablet by mouth every morning.    flecainide (TAMBOCOR) 100 MG Tab Take 1 Tablet by mouth 2 times a day.    hydrocortisone (CORTEF) 10 MG Tab Take 20 mg in am and 10 mg at pm, in case of sickness - double dose of the medication x 3 days    TRELEGY ELLIPTA 100-62.5-25 MCG/ACT inhaler INHALE 1 PUFF BY MOUTH AND INTO THE LUNGS ONCE DAILY    HYDROcodone-acetaminophen (NORCO) 5-325 MG Tab per tablet Take 1 Tablet by mouth 3 times a day.    sildenafil citrate (VIAGRA) 100 MG tablet 0.5-1 tab po prn, 45 minutes prior to desired erection and on empty stomach    Blood Glucose Monitoring Suppl Device Insurance preference    Blood Glucose Test Strips Test strips order: Test strips for Insurance preference meter. Sig: use 4 and prn ssx high or low sugar #100 RF x 3    Lancets Lancets order: Lancets for Insurance preference meter. Sig: use 4 and prn ssx high or low sugar. #100 RF x 3    BD PEN NEEDLE SHAWN U/F For use with 2-3 daily insulin and or GLP-1 injection.    albuterol 108 (90 Base) MCG/ACT Aero Soln inhalation aerosol Inhale 2 Puffs every 6 hours as needed for Shortness of Breath.    acetaminophen (TYLENOL) 325 MG Tab acetaminophen 325 mg tablet   take 2 tablets by mouth three times a day if needed for pain for 5 days    omeprazole (PRILOSEC) 20 MG CPDR Take 20 mg by mouth every day.         Review of Systems   Constitutional: Positive for malaise/fatigue.   Cardiovascular:  Negative for chest pain, dyspnea on exertion, leg swelling, near-syncope, orthopnea, palpitations, paroxysmal nocturnal dyspnea and syncope.   Respiratory:  Negative for shortness of breath.    Neurological:  Negative for dizziness, headaches and light-headedness.           Objective:   /60 (BP Location: Left arm, Patient Position: Sitting, BP Cuff Size: Adult)   " Pulse (!) 48   Resp 18   Ht 1.829 m (6')   Wt 87.5 kg (193 lb)   SpO2 98%  Body mass index is 26.18 kg/m².         Physical Exam  Vitals reviewed.   Constitutional:       General: He is not in acute distress.     Appearance: Normal appearance.   HENT:      Head: Normocephalic and atraumatic.   Cardiovascular:      Rate and Rhythm: Normal rate. Rhythm irregular.      Pulses: Normal pulses.      Heart sounds: No murmur heard.  Pulmonary:      Effort: Pulmonary effort is normal. No respiratory distress.      Breath sounds: Normal breath sounds.   Musculoskeletal:      Right lower leg: No edema.      Left lower leg: No edema.   Neurological:      Mental Status: He is alert and oriented to person, place, and time.      Gait: Gait normal.   Psychiatric:         Behavior: Behavior normal.             Lab Results   Component Value Date/Time    SODIUM 142 02/07/2024 08:06 AM    SODIUM 141 09/11/2017 04:30 PM    POTASSIUM 4.4 02/07/2024 08:06 AM    POTASSIUM 3.7 09/11/2017 04:30 PM    CHLORIDE 105 02/07/2024 08:06 AM    CHLORIDE 110 09/11/2017 04:30 PM    CO2 24 02/07/2024 08:06 AM    CO2 24 09/11/2017 04:30 PM    GLUCOSE 106 (H) 02/07/2024 08:06 AM    GLUCOSE 106 (H) 09/11/2017 04:30 PM    BUN 18 02/07/2024 08:06 AM    BUN 10 09/11/2017 04:30 PM    CREATININE 0.99 02/07/2024 08:06 AM    CREATININE 0.85 09/11/2017 04:30 PM    CREATININE 1.0 03/07/2008 02:20 PM    BUNCREATRAT 18 02/07/2024 08:06 AM      Lab Results   Component Value Date/Time    WBC 14.10 (H) 06/09/2023 04:48 AM    WBC 9.6 04/25/2022 07:24 AM    WBC 8.0 09/11/2017 04:30 PM    RBC 5.01 06/09/2023 04:48 AM    RBC 5.58 04/25/2022 07:24 AM    RBC 5.40 09/11/2017 04:30 PM    HEMOGLOBIN 18.0 (H) 02/07/2024 08:06 AM    HEMOGLOBIN 16.1 09/11/2017 04:30 PM    HEMATOCRIT 54.9 (H) 02/07/2024 08:06 AM    HEMATOCRIT 48.3 09/11/2017 04:30 PM    MCV 94.7 06/09/2023 04:48 AM    MCV 94 04/25/2022 07:24 AM    MCV 89.4 09/11/2017 04:30 PM    MCH 30.8 06/09/2023 04:48 AM     "MCH 31.4 04/25/2022 07:24 AM    MCH 29.8 09/11/2017 04:30 PM    MCHC 32.6 (L) 06/09/2023 04:48 AM    MCHC 33.5 04/25/2022 07:24 AM    MCHC 33.3 (L) 09/11/2017 04:30 PM    MPV 12.1 (H) 06/09/2023 04:48 AM    MPV 12.3 09/11/2017 04:30 PM    NEUTSPOLYS 75 04/25/2022 07:24 AM    NEUTSPOLYS 58.30 09/11/2017 04:30 PM    LYMPHOCYTES 16 04/25/2022 07:24 AM    LYMPHOCYTES 29.40 09/11/2017 04:30 PM    MONOCYTES 8 04/25/2022 07:24 AM    MONOCYTES 10.00 09/11/2017 04:30 PM    EOSINOPHILS 1 04/25/2022 07:24 AM    EOSINOPHILS 1.20 09/11/2017 04:30 PM    BASOPHILS 0 04/25/2022 07:24 AM    BASOPHILS 0.60 09/11/2017 04:30 PM    ANISOCYTOSIS 1+ 10/08/2016 02:18 AM      Lab Results   Component Value Date/Time    CHOLSTRLTOT 147 12/14/2023 08:24 AM    LDL 70 12/05/2016 07:15 AM    HDL 67 12/14/2023 08:24 AM    TRIGLYCERIDE 113 12/14/2023 08:24 AM       No results found for: \"TROPONINT\"  No results found for: \"NTPROBNP\"  Assessment:   1. Paroxysmal atrial fibrillation (HCC)  - EKG - Clinic Performed  - EC-ECHOCARDIOGRAM COMPLETE W/O CONT; Future  - CL-CARDIOVERSION; Future  - EC-ANNA W/O CONT; Future  - Comp Metabolic Panel; Future  - CBC WITHOUT DIFFERENTIAL; Future    2. High risk medication use  - EKG - Clinic Performed  - EC-ANNA W/O CONT; Future  - Comp Metabolic Panel; Future  - CBC WITHOUT DIFFERENTIAL; Future    3. Typical atrial flutter (HCC)  - EC-ECHOCARDIOGRAM COMPLETE W/O CONT; Future  - CL-CARDIOVERSION; Future  - EC-ANNA W/O CONT; Future  - Comp Metabolic Panel; Future  - CBC WITHOUT DIFFERENTIAL; Future    4. Dyslipidemia  - Lipid Profile; Future  - Lipoprotein (a); Future    5. Hypercoagulable state due to paroxysmal atrial fibrillation (HCC)    6. Essential hypertension    7. Tobacco abuse    8. Hypothyroidism, unspecified type          Medical Decision Making:  Today's Assessment / Plan:   Paroxysmal atrial fibrillation  High risk medication use  Hypercoagulable state due to atrial fibrillation  -EKG interpreted by me in " office as atrial flutter rate 91 with RBBB  -Continue flecainide 100 twice a day  -Continue diltiazem 120 daily  -Continue Xarelto 20 mg every evening  -had stopped xarelto for 3 days for possible back injection but restarted today  -ANNA/DCCV ordered to restore patient into NSR  The risks, benefits, and alternatives to electrical cardioversion were discussed. Conversion of atrial fibrillation to normal rhythm, at least transiently, is successful in 90 to 95% of patients, maintaining a normal rhythm depends on a number of factors, including underlying heart disease and antiarrhythmic medications. Atrial fibrillation often recurs with time and other treatments may be necessary. Risks of  cardioversion are low as long as anticoagulation issues are handled appropriately. There is a small (less than 1%) risk of embolic events, including stroke. Risks of electrical shock include mild muscle soreness and mild skin burning at the site of electrode placement. There is also a risk that cardioversion can stimulate more dangerous arrhythmias. The patient verbalized understanding of these potential complications and wishes to proceed with this procedure.  -discussed referral to EP for ablation, patient wants to wait until procedure and follow up before moving forward  -CMP, CBC ordered, thyroid just checked and managed by endocrinology  -ER precautions for new symptoms or follow up in clinic  -repeat echocardiogram ordered    Hyperlipidemia  -Most recent LDL 70  -Continue simvastatin 20 mg every evening  -Goal of less than 100  -repeat labs ordered including LPa    Hypertension  - Good control  - continue diltiazem 120 mg daily  -Continue felodipine 2.5 mg daily  -Review medications and has been on current regimen for years and tolerating well.  - goal < 130/80    Hypothyroidism  -Continue to monitor and follow up with endocrinology    Tobacco use disorder  -Had a  discussion on importance of smoking cessation for 4 mins with  the patient. Patient does not wish to quit at this time but has used patches in the past to significantly cut down. Educated patient on safety with smoking and oxygen use.    Return in about 5 weeks (around 9/2/2025) for Felecia CALIX.  Sooner if problems.    DARLING Jones.

## 2025-07-29 ENCOUNTER — TELEPHONE (OUTPATIENT)
Dept: CARDIOLOGY | Facility: MEDICAL CENTER | Age: 75
End: 2025-07-29
Payer: MEDICARE

## 2025-07-29 ENCOUNTER — OFFICE VISIT (OUTPATIENT)
Dept: CARDIOLOGY | Facility: MEDICAL CENTER | Age: 75
End: 2025-07-29
Payer: MEDICARE

## 2025-07-29 VITALS
RESPIRATION RATE: 18 BRPM | SYSTOLIC BLOOD PRESSURE: 116 MMHG | WEIGHT: 193 LBS | OXYGEN SATURATION: 98 % | HEIGHT: 72 IN | DIASTOLIC BLOOD PRESSURE: 60 MMHG | BODY MASS INDEX: 26.14 KG/M2 | HEART RATE: 48 BPM

## 2025-07-29 DIAGNOSIS — Z72.0 TOBACCO ABUSE: ICD-10-CM

## 2025-07-29 DIAGNOSIS — I48.0 HYPERCOAGULABLE STATE DUE TO PAROXYSMAL ATRIAL FIBRILLATION (HCC): ICD-10-CM

## 2025-07-29 DIAGNOSIS — I48.3 TYPICAL ATRIAL FLUTTER (HCC): ICD-10-CM

## 2025-07-29 DIAGNOSIS — Z79.899 HIGH RISK MEDICATION USE: ICD-10-CM

## 2025-07-29 DIAGNOSIS — E03.9 HYPOTHYROIDISM, UNSPECIFIED TYPE: ICD-10-CM

## 2025-07-29 DIAGNOSIS — E78.5 DYSLIPIDEMIA: ICD-10-CM

## 2025-07-29 DIAGNOSIS — D68.69 HYPERCOAGULABLE STATE DUE TO PAROXYSMAL ATRIAL FIBRILLATION (HCC): ICD-10-CM

## 2025-07-29 DIAGNOSIS — I48.0 PAROXYSMAL ATRIAL FIBRILLATION (HCC): Primary | ICD-10-CM

## 2025-07-29 DIAGNOSIS — I10 ESSENTIAL HYPERTENSION: ICD-10-CM

## 2025-07-29 LAB — EKG IMPRESSION: NORMAL

## 2025-07-29 PROCEDURE — 99214 OFFICE O/P EST MOD 30 MIN: CPT

## 2025-07-29 PROCEDURE — 93005 ELECTROCARDIOGRAM TRACING: CPT | Mod: TC

## 2025-07-29 RX ORDER — FLECAINIDE ACETATE 100 MG/1
100 TABLET ORAL 2 TIMES DAILY
Qty: 180 TABLET | Refills: 1 | Status: SHIPPED | OUTPATIENT
Start: 2025-07-29

## 2025-07-29 NOTE — TELEPHONE ENCOUNTER
Patient is scheduled for a ANNA/Cardioversion with anesthesia on 08- with Dr. Evans. The patient   has been instructed to check in at 6:00 am for the 8:00 procedure. Instructed to hold Mounjaro 7 days prior. Patient has been advised they will need a  home and with them after since they are sedated. Message sent to authorizations. Sent [email or web address] message to the patient with instructions. No device. FYI sent to Flor

## 2025-07-30 LAB
CHOLEST SERPL-MCNC: 121 MG/DL
HDLC SERPL-MCNC: 57 MG/DL
LDLC SERPL CALC-MCNC: 41 MG/DL
TRIGL SERPL-MCNC: 128 MG/DL

## 2025-08-01 ENCOUNTER — RESULTS FOLLOW-UP (OUTPATIENT)
Dept: CARDIOLOGY | Facility: MEDICAL CENTER | Age: 75
End: 2025-08-01
Payer: MEDICARE

## 2025-08-01 DIAGNOSIS — I48.0 PAROXYSMAL ATRIAL FIBRILLATION (HCC): ICD-10-CM

## 2025-08-01 DIAGNOSIS — Z79.899 HIGH RISK MEDICATION USE: ICD-10-CM

## 2025-08-01 DIAGNOSIS — I48.3 TYPICAL ATRIAL FLUTTER (HCC): ICD-10-CM

## 2025-08-04 ENCOUNTER — APPOINTMENT (OUTPATIENT)
Dept: ADMISSIONS | Facility: MEDICAL CENTER | Age: 75
End: 2025-08-04
Attending: STUDENT IN AN ORGANIZED HEALTH CARE EDUCATION/TRAINING PROGRAM
Payer: MEDICARE

## 2025-08-05 ENCOUNTER — RESULTS FOLLOW-UP (OUTPATIENT)
Dept: CARDIOLOGY | Facility: MEDICAL CENTER | Age: 75
End: 2025-08-05
Payer: MEDICARE

## 2025-08-08 ENCOUNTER — PRE-ADMISSION TESTING (OUTPATIENT)
Dept: ADMISSIONS | Facility: MEDICAL CENTER | Age: 75
End: 2025-08-08
Payer: MEDICARE

## 2025-08-08 RX ORDER — TESTOSTERONE CYPIONATE 200 MG/ML
INJECTION, SOLUTION INTRAMUSCULAR
COMMUNITY
Start: 2025-07-07

## 2025-08-08 RX ORDER — FELODIPINE 2.5 MG/1
2.5 TABLET, EXTENDED RELEASE ORAL DAILY
COMMUNITY
Start: 2025-05-12

## 2025-08-08 RX ORDER — PREGABALIN 50 MG/1
CAPSULE ORAL PRN
COMMUNITY
Start: 2025-07-22

## 2025-08-15 ENCOUNTER — APPOINTMENT (OUTPATIENT)
Dept: CARDIOLOGY | Facility: MEDICAL CENTER | Age: 75
End: 2025-08-15
Payer: MEDICARE

## 2025-08-15 ENCOUNTER — ANESTHESIA (OUTPATIENT)
Dept: CARDIOLOGY | Facility: MEDICAL CENTER | Age: 75
End: 2025-08-15
Payer: MEDICARE

## 2025-08-15 ENCOUNTER — HOSPITAL ENCOUNTER (OUTPATIENT)
Facility: MEDICAL CENTER | Age: 75
End: 2025-08-15
Attending: INTERNAL MEDICINE | Admitting: INTERNAL MEDICINE
Payer: MEDICARE

## 2025-08-15 ENCOUNTER — ANESTHESIA EVENT (OUTPATIENT)
Dept: CARDIOLOGY | Facility: MEDICAL CENTER | Age: 75
End: 2025-08-15
Payer: MEDICARE

## 2025-08-15 VITALS
HEART RATE: 77 BPM | RESPIRATION RATE: 21 BRPM | SYSTOLIC BLOOD PRESSURE: 146 MMHG | BODY MASS INDEX: 25.89 KG/M2 | WEIGHT: 191.14 LBS | OXYGEN SATURATION: 94 % | DIASTOLIC BLOOD PRESSURE: 88 MMHG | HEIGHT: 72 IN | TEMPERATURE: 97 F

## 2025-08-15 DIAGNOSIS — Z79.899 HIGH RISK MEDICATION USE: ICD-10-CM

## 2025-08-15 DIAGNOSIS — I48.3 TYPICAL ATRIAL FLUTTER (HCC): ICD-10-CM

## 2025-08-15 DIAGNOSIS — I48.0 PAROXYSMAL ATRIAL FIBRILLATION (HCC): ICD-10-CM

## 2025-08-15 LAB
ALBUMIN SERPL BCP-MCNC: 4.1 G/DL (ref 3.2–4.9)
ALBUMIN/GLOB SERPL: 1.4 G/DL
ALP SERPL-CCNC: 128 U/L (ref 30–99)
ALT SERPL-CCNC: 27 U/L (ref 2–50)
ANION GAP SERPL CALC-SCNC: 10 MMOL/L (ref 7–16)
AST SERPL-CCNC: 31 U/L (ref 12–45)
BILIRUB SERPL-MCNC: 0.8 MG/DL (ref 0.1–1.5)
BUN SERPL-MCNC: 12 MG/DL (ref 8–22)
CALCIUM ALBUM COR SERPL-MCNC: 9.5 MG/DL (ref 8.5–10.5)
CALCIUM SERPL-MCNC: 9.6 MG/DL (ref 8.5–10.5)
CHLORIDE SERPL-SCNC: 106 MMOL/L (ref 96–112)
CO2 SERPL-SCNC: 25 MMOL/L (ref 20–33)
CREAT SERPL-MCNC: 1.06 MG/DL (ref 0.5–1.4)
EKG IMPRESSION: NORMAL
EKG IMPRESSION: NORMAL
ERYTHROCYTE [DISTWIDTH] IN BLOOD BY AUTOMATED COUNT: 51.9 FL (ref 35.9–50)
GFR SERPLBLD CREATININE-BSD FMLA CKD-EPI: 74 ML/MIN/1.73 M 2
GLOBULIN SER CALC-MCNC: 2.9 G/DL (ref 1.9–3.5)
GLUCOSE SERPL-MCNC: 111 MG/DL (ref 65–99)
HCT VFR BLD AUTO: 48.7 % (ref 42–52)
HGB BLD-MCNC: 16.1 G/DL (ref 14–18)
INR PPP: 1.64 (ref 0.87–1.13)
MCH RBC QN AUTO: 28.8 PG (ref 27–33)
MCHC RBC AUTO-ENTMCNC: 33.1 G/DL (ref 32.3–36.5)
MCV RBC AUTO: 87.1 FL (ref 81.4–97.8)
PLATELET # BLD AUTO: 156 K/UL (ref 164–446)
PMV BLD AUTO: 12.2 FL (ref 9–12.9)
POTASSIUM SERPL-SCNC: 4.3 MMOL/L (ref 3.6–5.5)
PROT SERPL-MCNC: 7 G/DL (ref 6–8.2)
PROTHROMBIN TIME: 19.6 SEC (ref 12–14.6)
RBC # BLD AUTO: 5.59 M/UL (ref 4.7–6.1)
SODIUM SERPL-SCNC: 141 MMOL/L (ref 135–145)
WBC # BLD AUTO: 13.4 K/UL (ref 4.8–10.8)

## 2025-08-15 PROCEDURE — 160193 HCHG PACU STANDARD - 1ST 60 MINS

## 2025-08-15 PROCEDURE — 700101 HCHG RX REV CODE 250: Performed by: STUDENT IN AN ORGANIZED HEALTH CARE EDUCATION/TRAINING PROGRAM

## 2025-08-15 PROCEDURE — 85610 PROTHROMBIN TIME: CPT

## 2025-08-15 PROCEDURE — 80053 COMPREHEN METABOLIC PANEL: CPT

## 2025-08-15 PROCEDURE — 93005 ELECTROCARDIOGRAM TRACING: CPT | Mod: TC | Performed by: INTERNAL MEDICINE

## 2025-08-15 PROCEDURE — 93312 ECHO TRANSESOPHAGEAL: CPT | Mod: 26 | Performed by: INTERNAL MEDICINE

## 2025-08-15 PROCEDURE — 700111 HCHG RX REV CODE 636 W/ 250 OVERRIDE (IP): Performed by: STUDENT IN AN ORGANIZED HEALTH CARE EDUCATION/TRAINING PROGRAM

## 2025-08-15 PROCEDURE — 93325 DOPPLER ECHO COLOR FLOW MAPG: CPT

## 2025-08-15 PROCEDURE — 160194 HCHG PACU STANDARD - EA ADDL 30 MINS

## 2025-08-15 PROCEDURE — 92960 CARDIOVERSION ELECTRIC EXT: CPT | Performed by: INTERNAL MEDICINE

## 2025-08-15 PROCEDURE — 93010 ELECTROCARDIOGRAM REPORT: CPT | Performed by: INTERNAL MEDICINE

## 2025-08-15 PROCEDURE — 160002 HCHG RECOVERY MINUTES (STAT)

## 2025-08-15 PROCEDURE — 700105 HCHG RX REV CODE 258: Performed by: STUDENT IN AN ORGANIZED HEALTH CARE EDUCATION/TRAINING PROGRAM

## 2025-08-15 PROCEDURE — 85027 COMPLETE CBC AUTOMATED: CPT

## 2025-08-15 PROCEDURE — 4410588 CL-CARDIOVERSION

## 2025-08-15 RX ORDER — DIPHENHYDRAMINE HYDROCHLORIDE 50 MG/ML
12.5 INJECTION, SOLUTION INTRAMUSCULAR; INTRAVENOUS
Status: DISCONTINUED | OUTPATIENT
Start: 2025-08-15 | End: 2025-08-15 | Stop reason: HOSPADM

## 2025-08-15 RX ORDER — SODIUM CHLORIDE, SODIUM LACTATE, POTASSIUM CHLORIDE, CALCIUM CHLORIDE 600; 310; 30; 20 MG/100ML; MG/100ML; MG/100ML; MG/100ML
INJECTION, SOLUTION INTRAVENOUS
Status: DISCONTINUED | OUTPATIENT
Start: 2025-08-15 | End: 2025-08-15 | Stop reason: SURG

## 2025-08-15 RX ORDER — OXYCODONE HCL 5 MG/5 ML
10 SOLUTION, ORAL ORAL
Status: DISCONTINUED | OUTPATIENT
Start: 2025-08-15 | End: 2025-08-15 | Stop reason: HOSPADM

## 2025-08-15 RX ORDER — LABETALOL HYDROCHLORIDE 5 MG/ML
5 INJECTION, SOLUTION INTRAVENOUS
Status: DISCONTINUED | OUTPATIENT
Start: 2025-08-15 | End: 2025-08-15 | Stop reason: HOSPADM

## 2025-08-15 RX ORDER — HALOPERIDOL 5 MG/ML
1 INJECTION INTRAMUSCULAR
Status: DISCONTINUED | OUTPATIENT
Start: 2025-08-15 | End: 2025-08-15 | Stop reason: HOSPADM

## 2025-08-15 RX ORDER — HYDRALAZINE HYDROCHLORIDE 20 MG/ML
5 INJECTION INTRAMUSCULAR; INTRAVENOUS
Status: DISCONTINUED | OUTPATIENT
Start: 2025-08-15 | End: 2025-08-15 | Stop reason: HOSPADM

## 2025-08-15 RX ORDER — HYDROMORPHONE HYDROCHLORIDE 1 MG/ML
0.1 INJECTION, SOLUTION INTRAMUSCULAR; INTRAVENOUS; SUBCUTANEOUS
Status: DISCONTINUED | OUTPATIENT
Start: 2025-08-15 | End: 2025-08-15 | Stop reason: HOSPADM

## 2025-08-15 RX ORDER — HYDROMORPHONE HYDROCHLORIDE 1 MG/ML
0.4 INJECTION, SOLUTION INTRAMUSCULAR; INTRAVENOUS; SUBCUTANEOUS
Status: DISCONTINUED | OUTPATIENT
Start: 2025-08-15 | End: 2025-08-15 | Stop reason: HOSPADM

## 2025-08-15 RX ORDER — LIDOCAINE HYDROCHLORIDE 20 MG/ML
INJECTION, SOLUTION EPIDURAL; INFILTRATION; INTRACAUDAL; PERINEURAL PRN
Status: DISCONTINUED | OUTPATIENT
Start: 2025-08-15 | End: 2025-08-15 | Stop reason: SURG

## 2025-08-15 RX ORDER — MEPERIDINE HYDROCHLORIDE 25 MG/ML
6.25 INJECTION INTRAMUSCULAR; INTRAVENOUS; SUBCUTANEOUS
Status: DISCONTINUED | OUTPATIENT
Start: 2025-08-15 | End: 2025-08-15 | Stop reason: HOSPADM

## 2025-08-15 RX ORDER — SODIUM CHLORIDE, SODIUM LACTATE, POTASSIUM CHLORIDE, CALCIUM CHLORIDE 600; 310; 30; 20 MG/100ML; MG/100ML; MG/100ML; MG/100ML
INJECTION, SOLUTION INTRAVENOUS CONTINUOUS
Status: DISCONTINUED | OUTPATIENT
Start: 2025-08-15 | End: 2025-08-15 | Stop reason: HOSPADM

## 2025-08-15 RX ORDER — ONDANSETRON 2 MG/ML
4 INJECTION INTRAMUSCULAR; INTRAVENOUS
Status: DISCONTINUED | OUTPATIENT
Start: 2025-08-15 | End: 2025-08-15 | Stop reason: HOSPADM

## 2025-08-15 RX ORDER — HYDROMORPHONE HYDROCHLORIDE 1 MG/ML
0.2 INJECTION, SOLUTION INTRAMUSCULAR; INTRAVENOUS; SUBCUTANEOUS
Status: DISCONTINUED | OUTPATIENT
Start: 2025-08-15 | End: 2025-08-15 | Stop reason: HOSPADM

## 2025-08-15 RX ORDER — EPHEDRINE SULFATE 50 MG/ML
5 INJECTION, SOLUTION INTRAVENOUS
Status: DISCONTINUED | OUTPATIENT
Start: 2025-08-15 | End: 2025-08-15 | Stop reason: HOSPADM

## 2025-08-15 RX ORDER — ALBUTEROL SULFATE 5 MG/ML
2.5 SOLUTION RESPIRATORY (INHALATION)
Status: DISCONTINUED | OUTPATIENT
Start: 2025-08-15 | End: 2025-08-15 | Stop reason: HOSPADM

## 2025-08-15 RX ORDER — OXYCODONE HCL 5 MG/5 ML
5 SOLUTION, ORAL ORAL
Status: DISCONTINUED | OUTPATIENT
Start: 2025-08-15 | End: 2025-08-15 | Stop reason: HOSPADM

## 2025-08-15 RX ADMIN — PROPOFOL 30 MG: 10 INJECTION, EMULSION INTRAVENOUS at 08:25

## 2025-08-15 RX ADMIN — SODIUM CHLORIDE, POTASSIUM CHLORIDE, SODIUM LACTATE AND CALCIUM CHLORIDE: 600; 310; 30; 20 INJECTION, SOLUTION INTRAVENOUS at 08:18

## 2025-08-15 RX ADMIN — PROPOFOL 30 MG: 10 INJECTION, EMULSION INTRAVENOUS at 08:28

## 2025-08-15 RX ADMIN — PROPOFOL 30 MG: 10 INJECTION, EMULSION INTRAVENOUS at 08:27

## 2025-08-15 RX ADMIN — LIDOCAINE HYDROCHLORIDE 50 MG: 20 INJECTION, SOLUTION EPIDURAL; INFILTRATION; INTRACAUDAL; PERINEURAL at 08:18

## 2025-08-15 RX ADMIN — PROPOFOL 50 MG: 10 INJECTION, EMULSION INTRAVENOUS at 08:22

## 2025-08-15 ASSESSMENT — PAIN SCALES - GENERAL: PAIN_LEVEL: 0

## (undated) DEVICE — KIT ANESTHESIA W/CIRCUIT & 3/LT BAG W/FILTER (20EA/CA)

## (undated) DEVICE — CANNULA INJECTION 27G (EYE) - 10/BX ALCON

## (undated) DEVICE — SET EXTENSION WITH 2 PORTS (48EA/CA) ***PART #2C8610 IS A SUBSTITUTE*****

## (undated) DEVICE — SODIUM CHL IRRIGATION 0.9% 1000ML (12EA/CA)

## (undated) DEVICE — EXTRACTOR CORTEX ANGL LT 26GA - (10/BX) BINKHORST

## (undated) DEVICE — ELECTRODE 850 FOAM ADHESIVE - HYDROGEL RADIOTRNSPRNT (50/PK)

## (undated) DEVICE — NEEDLE CYSTOTOME OPTH VSTC  0.4MM X 16MM - (10/CA)

## (undated) DEVICE — GLOVE BIOGEL SURGICAL PF LATEX M SIZE 8.5 (50PR/BX 4BX/CA)

## (undated) DEVICE — SENSOR SPO2 NEO LNCS ADHESIVE (20/BX) SEE USER NOTES

## (undated) DEVICE — CANISTER SUCTION 3000ML MECHANICAL FILTER AUTO SHUTOFF MEDI-VAC NONSTERILE LF DISP  (40EA/CA)

## (undated) DEVICE — GLOVE BIOGEL INDICATOR SZ 6.5 SURGICAL PF LTX - (50PR/BX 4BX/CA)

## (undated) DEVICE — CON SEDATION/>5 YR 1ST 15 MIN

## (undated) DEVICE — TIP POLYMER I&A

## (undated) DEVICE — SUCTION INSTRUMENT YANKAUER BULBOUS TIP W/O VENT (50EA/CA)

## (undated) DEVICE — WATER IRRIGATION STERILE 1000ML (12EA/CA)

## (undated) DEVICE — Device

## (undated) DEVICE — ATOMIC EDGE ACCURATE DEPTH 550 MICRON (10/CA)

## (undated) DEVICE — KIT  I.V. START (100EA/CA)

## (undated) DEVICE — LACTATED RINGERS INJ 1000 ML - (14EA/CA 60CA/PF)

## (undated) DEVICE — TUBING CLEARLINK DUO-VENT - C-FLO (48EA/CA)

## (undated) DEVICE — SET LEADWIRE 5 LEAD BEDSIDE DISPOSABLE ECG (1SET OF 5/EA)

## (undated) DEVICE — CANISTER SUCTION RIGID RED 1500CC (40EA/CA)

## (undated) DEVICE — CANNULA DIVIDED ADULT CO2 - SAMPLE W/FEMALE CONNCT (25/CA)

## (undated) DEVICE — TUBE CONNECTING SUCTION - CLEAR PLASTIC STERILE 72 IN (50EA/CA)

## (undated) DEVICE — CATHETER IV 20 GA X 1-1/4 ---SURG.& SDS ONLY--- (50EA/BX)

## (undated) DEVICE — GLOVE BIOGEL PI INDICATOR SZ 7.0 SURGICAL PF LF - (50/BX 4BX/CA)

## (undated) DEVICE — KNIFE STEP 1.1 (6EA/BX)